# Patient Record
Sex: MALE | Race: WHITE | NOT HISPANIC OR LATINO | URBAN - METROPOLITAN AREA
[De-identification: names, ages, dates, MRNs, and addresses within clinical notes are randomized per-mention and may not be internally consistent; named-entity substitution may affect disease eponyms.]

---

## 2017-03-29 ENCOUNTER — INPATIENT (INPATIENT)
Facility: HOSPITAL | Age: 82
LOS: 12 days | Discharge: NURSING, REHAB OR LONG-TERM CARE | DRG: 854 | End: 2017-04-11
Attending: INTERNAL MEDICINE | Admitting: INTERNAL MEDICINE
Payer: MEDICARE

## 2017-03-29 VITALS
HEIGHT: 69 IN | RESPIRATION RATE: 16 BRPM | DIASTOLIC BLOOD PRESSURE: 74 MMHG | HEART RATE: 106 BPM | SYSTOLIC BLOOD PRESSURE: 153 MMHG | OXYGEN SATURATION: 97 % | TEMPERATURE: 99 F | WEIGHT: 188.05 LBS

## 2017-03-29 DIAGNOSIS — E11.9 TYPE 2 DIABETES MELLITUS WITHOUT COMPLICATIONS: ICD-10-CM

## 2017-03-29 DIAGNOSIS — L03.116 CELLULITIS OF LEFT LOWER LIMB: ICD-10-CM

## 2017-03-29 DIAGNOSIS — M71.10 OTHER INFECTIVE BURSITIS, UNSPECIFIED SITE: ICD-10-CM

## 2017-03-29 LAB
ACETONE SERPL-MCNC: ABNORMAL
ALBUMIN SERPL ELPH-MCNC: 2.6 G/DL — LOW (ref 3.3–5.2)
ALP SERPL-CCNC: 114 U/L — SIGNIFICANT CHANGE UP (ref 40–120)
ALT FLD-CCNC: 42 U/L — HIGH
ANION GAP SERPL CALC-SCNC: 14 MMOL/L — SIGNIFICANT CHANGE UP (ref 5–17)
APTT BLD: 29.9 SEC — SIGNIFICANT CHANGE UP (ref 27.5–37.4)
AST SERPL-CCNC: 64 U/L — HIGH
BASOPHILS # BLD AUTO: 0 K/UL — SIGNIFICANT CHANGE UP (ref 0–0.2)
BILIRUB SERPL-MCNC: 1.7 MG/DL — SIGNIFICANT CHANGE UP (ref 0.4–2)
BLD GP AB SCN SERPL QL: SIGNIFICANT CHANGE UP
BUN SERPL-MCNC: 23 MG/DL — HIGH (ref 8–20)
CALCIUM SERPL-MCNC: 9 MG/DL — SIGNIFICANT CHANGE UP (ref 8.6–10.2)
CHLORIDE SERPL-SCNC: 95 MMOL/L — LOW (ref 98–107)
CO2 SERPL-SCNC: 24 MMOL/L — SIGNIFICANT CHANGE UP (ref 22–29)
CREAT SERPL-MCNC: 0.83 MG/DL — SIGNIFICANT CHANGE UP (ref 0.5–1.3)
EOSINOPHIL # BLD AUTO: 0 K/UL — SIGNIFICANT CHANGE UP (ref 0–0.5)
EOSINOPHIL NFR BLD AUTO: 1 % — SIGNIFICANT CHANGE UP (ref 0–6)
GLUCOSE SERPL-MCNC: 162 MG/DL — HIGH (ref 70–115)
HCT VFR BLD CALC: 36.4 % — LOW (ref 42–52)
HGB BLD-MCNC: 12.7 G/DL — LOW (ref 14–18)
INR BLD: 1.38 RATIO — HIGH (ref 0.88–1.16)
LACTATE BLDV-MCNC: 2.1 MMOL/L — HIGH (ref 0.7–2)
LYMPHOCYTES # BLD AUTO: 1.5 K/UL — SIGNIFICANT CHANGE UP (ref 1–4.8)
LYMPHOCYTES # BLD AUTO: 13 % — LOW (ref 20–55)
MCHC RBC-ENTMCNC: 34.5 PG — HIGH (ref 27–31)
MCHC RBC-ENTMCNC: 34.9 G/DL — SIGNIFICANT CHANGE UP (ref 32–36)
MCV RBC AUTO: 98.9 FL — HIGH (ref 80–94)
MONOCYTES # BLD AUTO: 1.5 K/UL — HIGH (ref 0–0.8)
MONOCYTES NFR BLD AUTO: 13 % — HIGH (ref 3–10)
NEUTROPHILS # BLD AUTO: 8.6 K/UL — HIGH (ref 1.8–8)
NEUTROPHILS NFR BLD AUTO: 72 % — SIGNIFICANT CHANGE UP (ref 37–73)
PLAT MORPH BLD: NORMAL — SIGNIFICANT CHANGE UP
PLATELET # BLD AUTO: 235 K/UL — SIGNIFICANT CHANGE UP (ref 150–400)
POTASSIUM SERPL-MCNC: 5.1 MMOL/L — SIGNIFICANT CHANGE UP (ref 3.5–5.3)
POTASSIUM SERPL-SCNC: 5.1 MMOL/L — SIGNIFICANT CHANGE UP (ref 3.5–5.3)
PROT SERPL-MCNC: 9.4 G/DL — HIGH (ref 6.6–8.7)
PROTHROM AB SERPL-ACNC: 15.3 SEC — HIGH (ref 9.8–12.7)
RBC # BLD: 3.68 M/UL — LOW (ref 4.6–6.2)
RBC # FLD: 14.2 % — SIGNIFICANT CHANGE UP (ref 11–15.6)
RBC BLD AUTO: NORMAL — SIGNIFICANT CHANGE UP
SODIUM SERPL-SCNC: 133 MMOL/L — LOW (ref 135–145)
TYPE + AB SCN PNL BLD: SIGNIFICANT CHANGE UP
VARIANT LYMPHS # BLD: 1 % — SIGNIFICANT CHANGE UP (ref 0–6)
WBC # BLD: 11.8 K/UL — HIGH (ref 4.8–10.8)
WBC # FLD AUTO: 11.8 K/UL — HIGH (ref 4.8–10.8)

## 2017-03-29 PROCEDURE — 99223 1ST HOSP IP/OBS HIGH 75: CPT

## 2017-03-29 PROCEDURE — 73562 X-RAY EXAM OF KNEE 3: CPT | Mod: 26,LT

## 2017-03-29 PROCEDURE — 71010: CPT | Mod: 26

## 2017-03-29 PROCEDURE — 93970 EXTREMITY STUDY: CPT | Mod: 26

## 2017-03-29 PROCEDURE — 99285 EMERGENCY DEPT VISIT HI MDM: CPT

## 2017-03-29 PROCEDURE — 73552 X-RAY EXAM OF FEMUR 2/>: CPT | Mod: 26,LT

## 2017-03-29 RX ORDER — INSULIN GLARGINE 100 [IU]/ML
30 INJECTION, SOLUTION SUBCUTANEOUS AT BEDTIME
Qty: 0 | Refills: 0 | Status: DISCONTINUED | OUTPATIENT
Start: 2017-03-29 | End: 2017-03-31

## 2017-03-29 RX ORDER — DOCUSATE SODIUM 100 MG
100 CAPSULE ORAL THREE TIMES A DAY
Qty: 0 | Refills: 0 | Status: DISCONTINUED | OUTPATIENT
Start: 2017-03-29 | End: 2017-03-31

## 2017-03-29 RX ORDER — DEXTROSE 50 % IN WATER 50 %
25 SYRINGE (ML) INTRAVENOUS ONCE
Qty: 0 | Refills: 0 | Status: DISCONTINUED | OUTPATIENT
Start: 2017-03-29 | End: 2017-03-31

## 2017-03-29 RX ORDER — CEFAZOLIN SODIUM 1 G
1000 VIAL (EA) INJECTION ONCE
Qty: 0 | Refills: 0 | Status: COMPLETED | OUTPATIENT
Start: 2017-03-29 | End: 2017-03-29

## 2017-03-29 RX ORDER — MORPHINE SULFATE 50 MG/1
2 CAPSULE, EXTENDED RELEASE ORAL EVERY 4 HOURS
Qty: 0 | Refills: 0 | Status: DISCONTINUED | OUTPATIENT
Start: 2017-03-29 | End: 2017-03-31

## 2017-03-29 RX ORDER — LISINOPRIL 2.5 MG/1
20 TABLET ORAL DAILY
Qty: 0 | Refills: 0 | Status: DISCONTINUED | OUTPATIENT
Start: 2017-03-29 | End: 2017-03-31

## 2017-03-29 RX ORDER — ONDANSETRON 8 MG/1
4 TABLET, FILM COATED ORAL EVERY 4 HOURS
Qty: 0 | Refills: 0 | Status: DISCONTINUED | OUTPATIENT
Start: 2017-03-29 | End: 2017-03-31

## 2017-03-29 RX ORDER — ACETAMINOPHEN 500 MG
650 TABLET ORAL EVERY 6 HOURS
Qty: 0 | Refills: 0 | Status: DISCONTINUED | OUTPATIENT
Start: 2017-03-29 | End: 2017-03-31

## 2017-03-29 RX ORDER — DEXTROSE 50 % IN WATER 50 %
12.5 SYRINGE (ML) INTRAVENOUS ONCE
Qty: 0 | Refills: 0 | Status: DISCONTINUED | OUTPATIENT
Start: 2017-03-29 | End: 2017-03-31

## 2017-03-29 RX ORDER — MORPHINE SULFATE 50 MG/1
4 CAPSULE, EXTENDED RELEASE ORAL ONCE
Qty: 0 | Refills: 0 | Status: DISCONTINUED | OUTPATIENT
Start: 2017-03-29 | End: 2017-03-29

## 2017-03-29 RX ORDER — INSULIN LISPRO 100/ML
VIAL (ML) SUBCUTANEOUS
Qty: 0 | Refills: 0 | Status: DISCONTINUED | OUTPATIENT
Start: 2017-03-29 | End: 2017-03-31

## 2017-03-29 RX ORDER — DEXTROSE 50 % IN WATER 50 %
1 SYRINGE (ML) INTRAVENOUS ONCE
Qty: 0 | Refills: 0 | Status: DISCONTINUED | OUTPATIENT
Start: 2017-03-29 | End: 2017-03-31

## 2017-03-29 RX ORDER — SODIUM CHLORIDE 9 MG/ML
3 INJECTION INTRAMUSCULAR; INTRAVENOUS; SUBCUTANEOUS ONCE
Qty: 0 | Refills: 0 | Status: COMPLETED | OUTPATIENT
Start: 2017-03-29 | End: 2017-03-29

## 2017-03-29 RX ORDER — GLUCAGON INJECTION, SOLUTION 0.5 MG/.1ML
1 INJECTION, SOLUTION SUBCUTANEOUS ONCE
Qty: 0 | Refills: 0 | Status: DISCONTINUED | OUTPATIENT
Start: 2017-03-29 | End: 2017-03-31

## 2017-03-29 RX ORDER — ENOXAPARIN SODIUM 100 MG/ML
40 INJECTION SUBCUTANEOUS EVERY 24 HOURS
Qty: 0 | Refills: 0 | Status: DISCONTINUED | OUTPATIENT
Start: 2017-03-29 | End: 2017-03-30

## 2017-03-29 RX ORDER — SENNA PLUS 8.6 MG/1
2 TABLET ORAL AT BEDTIME
Qty: 0 | Refills: 0 | Status: DISCONTINUED | OUTPATIENT
Start: 2017-03-29 | End: 2017-03-31

## 2017-03-29 RX ORDER — SODIUM CHLORIDE 9 MG/ML
1000 INJECTION, SOLUTION INTRAVENOUS
Qty: 0 | Refills: 0 | Status: DISCONTINUED | OUTPATIENT
Start: 2017-03-29 | End: 2017-03-31

## 2017-03-29 RX ORDER — SODIUM CHLORIDE 9 MG/ML
1000 INJECTION INTRAMUSCULAR; INTRAVENOUS; SUBCUTANEOUS
Qty: 0 | Refills: 0 | Status: DISCONTINUED | OUTPATIENT
Start: 2017-03-29 | End: 2017-03-31

## 2017-03-29 RX ORDER — CEFAZOLIN SODIUM 1 G
2000 VIAL (EA) INJECTION EVERY 8 HOURS
Qty: 0 | Refills: 0 | Status: DISCONTINUED | OUTPATIENT
Start: 2017-03-29 | End: 2017-03-31

## 2017-03-29 RX ORDER — METFORMIN HYDROCHLORIDE 850 MG/1
0 TABLET ORAL
Qty: 0 | Refills: 0 | COMMUNITY

## 2017-03-29 RX ADMIN — INSULIN GLARGINE 30 UNIT(S): 100 INJECTION, SOLUTION SUBCUTANEOUS at 21:40

## 2017-03-29 RX ADMIN — MORPHINE SULFATE 2 MILLIGRAM(S): 50 CAPSULE, EXTENDED RELEASE ORAL at 17:57

## 2017-03-29 RX ADMIN — Medication 2 MILLIGRAM(S): at 15:41

## 2017-03-29 RX ADMIN — MORPHINE SULFATE 4 MILLIGRAM(S): 50 CAPSULE, EXTENDED RELEASE ORAL at 15:35

## 2017-03-29 RX ADMIN — Medication 100 MILLIGRAM(S): at 22:14

## 2017-03-29 RX ADMIN — MORPHINE SULFATE 2 MILLIGRAM(S): 50 CAPSULE, EXTENDED RELEASE ORAL at 22:14

## 2017-03-29 RX ADMIN — Medication 100 MILLIGRAM(S): at 15:35

## 2017-03-29 RX ADMIN — MORPHINE SULFATE 2 MILLIGRAM(S): 50 CAPSULE, EXTENDED RELEASE ORAL at 18:15

## 2017-03-29 RX ADMIN — SODIUM CHLORIDE 3 MILLILITER(S): 9 INJECTION INTRAMUSCULAR; INTRAVENOUS; SUBCUTANEOUS at 14:37

## 2017-03-29 RX ADMIN — SENNA PLUS 2 TABLET(S): 8.6 TABLET ORAL at 21:40

## 2017-03-29 RX ADMIN — MORPHINE SULFATE 2 MILLIGRAM(S): 50 CAPSULE, EXTENDED RELEASE ORAL at 22:44

## 2017-03-29 RX ADMIN — ENOXAPARIN SODIUM 40 MILLIGRAM(S): 100 INJECTION SUBCUTANEOUS at 21:42

## 2017-03-29 RX ADMIN — SODIUM CHLORIDE 60 MILLILITER(S): 9 INJECTION INTRAMUSCULAR; INTRAVENOUS; SUBCUTANEOUS at 17:57

## 2017-03-29 RX ADMIN — MORPHINE SULFATE 4 MILLIGRAM(S): 50 CAPSULE, EXTENDED RELEASE ORAL at 00:00

## 2017-03-29 RX ADMIN — Medication 100 MILLIGRAM(S): at 21:42

## 2017-03-29 RX ADMIN — LISINOPRIL 20 MILLIGRAM(S): 2.5 TABLET ORAL at 21:40

## 2017-03-29 NOTE — H&P ADULT - HISTORY OF PRESENT ILLNESS
Pt has had left knee pain since Monday gradually worsening pain and edema over the days. He saw his PMD who ordered MRI and sent him to Ortho. Ortho tapped the knee outpatient that grew MSSA per Dr Doe. MRi showed torn meniscus and surgical repair was planned for this week. He was given Keflex and pain meds which he took for 4 days. He also had a Doppler study that was neg for DVT. But since pain worsened he came here. no fever, trauma, or any other associated symptoms  PMH- DM2, HTN ?, heart murmur, alc cirrhosis  SH- quit tobacco 50 yrs ago, alcohol use most days of the week. since last monday has not used any, not in withdrawal  Meds- obtained from pharmacy

## 2017-03-29 NOTE — CONSULT NOTE ADULT - PROBLEM SELECTOR RECOMMENDATION 9
Culture of bursa from outside orthopedics office with MSSA  Reviewed outside labs  Start Cefazolin 2gm IV [id number]  Follow up blood cultures  Orthopedics consult  Check ESR and CRP

## 2017-03-29 NOTE — ED STATDOCS - PROGRESS NOTE DETAILS
80 y/o M pt w/ no significant PMHx presents to the ED c/o L knee pain and swelling. Pt's family states that the pt has a torn meniscus and a staph infection of his knee; pt was sent in by his PMD for abx therapy. Pt will have surgery by [facility]. Focused evaluation, protocol orders entered, placed in main for complete evaluation by another provider. PMD: Dr. Doe. PE: warm, swollen L knee, positive pitting edema of L calf (pt had recent doppler that was negative for DVT), unable to straighten out L knee. Received call out from Dr. Doe that pt is noncompliant DM. Dr. Samuels and Dr. Sutton to be notified. Pt to be admitted to the hospitalist.

## 2017-03-29 NOTE — ED ADULT NURSE NOTE - OBJECTIVE STATEMENT
received pt AOx3 with grandson at bedside, pt states he has a left torn meniscus with swelling and infection to area. pt sent in by Dr arteaga for IV abx. prior to possible surgery on Friday. pt has weak DP pulse. respirations even unlabored, MAEx4, neuro intact, no compliant diabetic, 3+ edema noted to LLE and 2+ to RLE will continue ot monitor.

## 2017-03-29 NOTE — H&P ADULT - EXTREMITIES COMMENTS
left knee swelling, warmth, ascending mild erythema over back of the thigh, associated edema and warmth + no calf tenderness

## 2017-03-29 NOTE — CONSULT NOTE ADULT - SUBJECTIVE AND OBJECTIVE BOX
Pt Name: MAGED NOBLE    MRN: [id number]      Patient is a 81y Male presenting to the emergency department with a chief complaint of left knee pain for approximately 10 days.     Patient is a 81y old  Male who presents with a chief complaint of left knee pain for approximately 10 days. He reports of sudden onset of pain on 3/20/2017 while in [place]. No trauma recalled. He saw an orthopedist a few days later. He had an aspiration performed and an MRI ordered. He was diagnosed with a mensical tear early this week. The aspiration cell count was 11k. The culture was found to have few staph with sensitivity to Keflex. He was started on Keflex 1-2 days ago. Due to the swelling of the left lower leg, he was sent for a doppler. No DVT found. Inisital aspiration on Thursday found 30ml of fluid and repeat yesterday 20ml. Mildly cloudy. He denies of past knee surgery. No past injections. He reports of pain with extension. No current fevers. He was referred by office for additional care.       REVIEW OF SYSTEMS      General: Alert, responsive, in NAD    Skin/Breast: No rashes, no pruritis   	  Ophthalmologic: No visual changes. No redness.   	  ENMT:	No discharge. No swelling.    Respiratory and Thorax: No difficulty breathing. No cough.  	   Cardiovascular:	No chest pain. No palpitations.    Gastrointestinal:	 No abdominal pain. No diarrhea.     Genitourinary: No dysuria. No bleeding.    Musculoskeletal: SEE HPI.    Neurological: + chronic tremor No sensory or motor changes.     Psychiatric: No anxiety or depression.    Hematology/Lymphatics: No swelling.    Endocrine: No Hx of diabetes.    ROS is otherwise negative.    PAST MEDICAL & SURGICAL HISTORY:  PAST MEDICAL & SURGICAL HISTORY:  DM (diabetes mellitus)  Abdominal surgery      Allergies: No Known Allergies      Medications: morphine  - Injectable 4milliGRAM(s) IV Push Once  ceFAZolin   IVPB 1000milliGRAM(s) IV Intermittent once  LORazepam   Injectable 2milliGRAM(s) IV Push Once      FAMILY HISTORY: non-contributory    Social History: + daily alcohol usage - "few vodkas daily"    Ambulation: Walking independently [x] With Cane [ ] With Walker [ ]  Bedbound [ ]                           12.7   11.8  )-----------( 235      ( 29 Mar 2017 14:32 )             36.4     29 Mar 2017 14:32    133    |  95     |  23.0   ----------------------------<  162    5.1     |  24.0   |  0.83     Ca    9.0        29 Mar 2017 14:32    TPro  9.4    /  Alb  2.6    /  TBili  1.7    /  DBili  x      /  AST  64     /  ALT  42     /  AlkPhos  114    29 Mar 2017 14:32      PHYSICAL EXAM:    Vital Signs Last 24 Hrs  T(C): 37.1, Max: 37.1 (03-29 @ 12:39)  T(F): 98.8, Max: 98.8 (03-29 @ 12:39)  HR: 106 (106 - 106)  BP: 153/74 (153/74 - 153/74)  BP(mean): --  RR: 16 (16 - 16)  SpO2: 97% (97% - 97%)  Daily Height in cm: 175.26 (29 Mar 2017 12:39)    Daily     Appearance: Alert, responsive, in no acute distress. + mild tremor noted.    Neurological: Sensation is grossly intact to light touch. 5/5 motor function of all extremities. No focal deficits or weaknesses found.    Skin: no rash on visible skin. Skin is clean, dry and intact. No bleeding. No abrasions. No ulcerations. + mild erythema of the medial distal left thigh area. No knee erythema. + mild 0-1+ edema of the left lower leg. No surgical scars noted. No knee warmth.    Vascular: 2+ distal pulses. Cap refill < 2 sec. + signs of early distal venous insuffiencey. No extremity ulcerations. No cyanosis.    Musculoskeletal:         Left Upper Extremity:  + NROM. Non-tender. No signs of trauma.        Right Upper Extremity: + NROM. Non-tender. No signs of trauma.        Left Lower Extremity: + guarding with attempted knee extension. + limited A/PROM from -20 extension to 90 flexion. No calf tenderness. No effusion. No        Right Lower Extremity: + NROM. Non-tender. No signs of trauma.     Imaging Studies:    US in ED- No significant intra / extra-articular effusion found to aspirate. + subcutaneous changes c/w cellultis found.     A/P:  Pt is a  81y Male with Patient is a 81y old  Male who presents with a chief complaint of left knee pain found to have left thigh cellulitis.    PLAN:   * Patient does not want a repeated aspiration performed in light of unofficial US findings.  * IV antibiotics  * Pain control  * Will follow Pt Name: MAGED NOBLE    MRN: [id number]      Patient is a 81y Male presenting to the emergency department with a chief complaint of left knee pain for approximately 10 days.     Patient is a 81y old  Male who presents with a chief complaint of left knee pain for approximately 10 days. He reports of sudden onset of pain on 3/20/2017 while in [place]. No trauma recalled. He saw an orthopedist a few days later. He had an aspiration performed and an MRI ordered. He was diagnosed with a mensical tear early this week. The aspiration cell count was 11k. The culture was found to have few staph with sensitivity to Keflex. He was started on Keflex 1-2 days ago. Due to the swelling of the left lower leg, he was sent for a doppler. No DVT found. Inisital aspiration on Thursday found 30ml of fluid and repeat yesterday 20ml. Mildly cloudy. He denies of past knee surgery. No past injections. He reports of pain with extension. No current fevers. He was referred by office for additional care.       REVIEW OF SYSTEMS      General: Alert, responsive, in NAD    Skin/Breast: No rashes, no pruritis   	  Ophthalmologic: No visual changes. No redness.   	  ENMT:	No discharge. No swelling.    Respiratory and Thorax: No difficulty breathing. No cough.  	   Cardiovascular:	No chest pain. No palpitations.    Gastrointestinal:	 No abdominal pain. No diarrhea.     Genitourinary: No dysuria. No bleeding.    Musculoskeletal: SEE HPI.    Neurological: + chronic tremor No sensory or motor changes.     Psychiatric: No anxiety or depression.    Hematology/Lymphatics: No swelling.    Endocrine: No Hx of diabetes.    ROS is otherwise negative.    PAST MEDICAL & SURGICAL HISTORY:  PAST MEDICAL & SURGICAL HISTORY:  DM (diabetes mellitus)  Abdominal surgery      Allergies: No Known Allergies      Medications: morphine  - Injectable 4milliGRAM(s) IV Push Once  ceFAZolin   IVPB 1000milliGRAM(s) IV Intermittent once  LORazepam   Injectable 2milliGRAM(s) IV Push Once      FAMILY HISTORY: non-contributory    Social History: + daily alcohol usage - "few vodkas daily"    Ambulation: Walking independently [x] With Cane [ ] With Walker [ ]  Bedbound [ ]                           12.7   11.8  )-----------( 235      ( 29 Mar 2017 14:32 )             36.4     29 Mar 2017 14:32    133    |  95     |  23.0   ----------------------------<  162    5.1     |  24.0   |  0.83     Ca    9.0        29 Mar 2017 14:32    TPro  9.4    /  Alb  2.6    /  TBili  1.7    /  DBili  x      /  AST  64     /  ALT  42     /  AlkPhos  114    29 Mar 2017 14:32      PHYSICAL EXAM:    Vital Signs Last 24 Hrs  T(C): 37.1, Max: 37.1 (03-29 @ 12:39)  T(F): 98.8, Max: 98.8 (03-29 @ 12:39)  HR: 106 (106 - 106)  BP: 153/74 (153/74 - 153/74)  BP(mean): --  RR: 16 (16 - 16)  SpO2: 97% (97% - 97%)  Daily Height in cm: 175.26 (29 Mar 2017 12:39)    Daily     Appearance: Alert, responsive, in no acute distress. + mild tremor noted.    Neurological: Sensation is grossly intact to light touch. 5/5 motor function of all extremities. No focal deficits or weaknesses found.    Skin: no rash on visible skin. Skin is clean, dry and intact. No bleeding. No abrasions. No ulcerations. + mild erythema of the medial distal left thigh area. No knee erythema. + mild 0-1+ edema of the left lower leg. No surgical scars noted. No knee warmth.    Vascular: 2+ distal pulses. Cap refill < 2 sec. + signs of early distal venous insuffiencey. No extremity ulcerations. No cyanosis.    Musculoskeletal:         Left Upper Extremity:  + NROM. Non-tender. No signs of trauma.        Right Upper Extremity: + NROM. Non-tender. No signs of trauma.        Left Lower Extremity: + guarding with attempted knee extension. + limited A/PROM from -20 extension to 90 flexion. No calf tenderness. No effusion. No        Right Lower Extremity: + NROM. Non-tender. No signs of trauma.     Imaging Studies:    US in ED- No significant intra / extra-articular effusion found to aspirate. + subcutaneous changes c/w cellultis found.     A/P:  Pt is a  81y Male with Patient is a 81y old  Male who presents with a chief complaint of left knee pain found to have left thigh cellulitis.    PLAN:   * Patient does not want a repeated aspiration performed in light of unofficial US findings.  * IV antibiotics  * Pain control  * Will follow  This case was discussed with his outpatient primary care physician I think this is gross orthopedic malpractice that this gentleman has been treated as an outpatient and scheduled for an outpatient elective knee arthroscopy for septic joint with staff despite the cell count being on 11,000 the patient's cultures are positive. He is being now sent to our hospital despite that these physicians do outpatient surgery and he also has a total joint specialist in their practice [facility]. per practices at least I believe this is Denny abusive [facility] orthopedic service line this gentleman will be sent to the hospital treated appropriately scheduled for operative management if repeat aspirations are positive for infection etc./his cultures are positive

## 2017-03-29 NOTE — H&P ADULT - ASSESSMENT
1. Septic knee- MSSA by cultures. Ancef by ID. bld c/s testing, rpt doppler here.   2. Pain issues- percocet and morphine, zofran, laxatives, IVF  3. Heart murmur- defer to ID if this needs to be assessed in relation to infection  4. DM2- HISS  5. Alc Cirrhosis- avoid hepatotoxics.   Lovenox

## 2017-03-29 NOTE — ED PROVIDER NOTE - PROGRESS NOTE DETAILS
pt evaluated by Prabhakar (ortho PA) and states likely cellulitis.  agree with admit for IV abx and will follow.  case d/w Arnold and will admit

## 2017-03-29 NOTE — ED ADULT TRIAGE NOTE - CHIEF COMPLAINT QUOTE
left knee pain due to torn cartilage,  with a staph infection. sent over by Dr Jarod Doe for antibiotic therapy

## 2017-03-29 NOTE — CONSULT NOTE ADULT - SUBJECTIVE AND OBJECTIVE BOX
[facility]  INFECTIOUS DISEASES AND INTERNAL MEDICINE OF [place]  =======================================================  Ketan Ramesh MD  Diplomates American Board of Internal Medicine and Infectious Diseases  =======================================================      MRN-[id number]  MAGED NOBLE    82y/o  Male with left knee torn meniscus was evaluated by orthopedics as an outpatient and had a arthrocentesis 3/24/17 with  11,398 nucleated cells with 98%polys and culture with MSSA. Patient was sent in by Dr Doe for further treatment and evaluation.     Past Medical & Surgical Hx:  DM (diabetes mellitus)  No significant past surgical history      Social Hx:  Former smoker      FAMILY HISTORY:  No pertinent family history in first degree relatives      Allergies  No Known Allergies      Antibiotics:  None       REVIEW OF SYSTEMS:  CONSTITUTIONAL:  No chills  HEENT:   No diplopia or blurred vision. No earache, sore throat or runny nose.  CARDIOVASCULAR:  No pressure, squeezing, strangling, tightness, heaviness or aching about the chest, neck, axilla or epigastrium.  RESPIRATORY:  No cough, shortness of breat  GASTROINTESTINAL:  No nausea, vomiting or diarrhea.  GENITOURINARY:  No dysuria, frequency or urgency.   MUSCULOSKELETAL:  Left knee pain  SKIN:  No change in skin, hair or nails.  NEUROLOGIC:  No paresthesias, fasciculations  PSYCHIATRIC:  No disorder of thought or mood.  ENDOCRINE:  No heat or cold intolerance, polyuria or polydipsia.  HEMATOLOGICAL:  No easy bruising or bleeding.       Physical Exam:  Vital Signs Last 24 Hrs  T(C): 37.1, Max: 37.1 (03-29 @ 12:39)  T(F): 98.8, Max: 98.8 (03-29 @ 12:39)  HR: 106 (106 - 106)  BP: 153/74 (153/74 - 153/74)  RR: 16 (16 - 16)  SpO2: 97% (97% - 97%)  Height (cm): 175.3 (03-29 @ 12:51)  Weight (kg): 85.3 (03-29 @ 12:51)  BMI (kg/m2): 27.8 (03-29 @ 12:51)  BSA (m2): 2.01 (03-29 @ 12:51)    GEN: NAD, pleasant  HEENT: normocephalic and atraumatic. EOMI. PERRL.    NECK: Supple.   LUNGS: Clear to auscultation.  HEART: Regular rate and rhythm  ABDOMEN: Soft, nontender, and nondistended.  Positive bowel sounds.    : no CVA tenderness  EXTREMITIES: LLE knee swelling and leg swelling  MSK: Left knee joint effusion and swelling  NEUROLOGIC: AAOx3  PSYCHIATRIC: Appropriate affect .  SKIN: Left knee redness and warmth        Labs:  29 Mar 2017 14:32    133    |  95     |  23.0   ----------------------------<  162    5.1     |  24.0   |  0.83     Ca    9.0        29 Mar 2017 14:32    TPro  9.4    /  Alb  2.6    /  TBili  1.7    /  DBili  x      /  AST  64     /  ALT  42     /  AlkPhos  114    29 Mar 2017 14:32                          12.7   11.8  )-----------( 235      ( 29 Mar 2017 14:32 )             36.4       PT/INR - ( 29 Mar 2017 14:32 )   PT: 15.3 sec;   INR: 1.38 ratio         PTT - ( 29 Mar 2017 14:32 )  PTT:29.9 sec    LIVER FUNCTIONS - ( 29 Mar 2017 14:32 )  Alb: 2.6 g/dL / Pro: 9.4 g/dL / ALK PHOS: 114 U/L / ALT: 42 U/L / AST: 64 U/L / GGT: x               EXAM:  XR FEMUR 2 VIEWS LT                        EXAM:  XR KNEE 3 VIEWS LT                        PROCEDURE DATE:  03/29/2017    INTERPRETATION:  Left femur and  left knee  7 views were performed in AP, oblique and lateral projections  History: Pain. Cellulitis.  Osseous structures are intact. No evidence of acute fracture or   dislocation.  There is no significant joint space narrowing. The   articular surfaces appear intact.. Effusion in suprapatellar recess.    Impression:  No bone abnormality. Joint effusion      CXR  Impression: Small left pleural effusion.

## 2017-03-30 LAB
ANION GAP SERPL CALC-SCNC: 9 MMOL/L — SIGNIFICANT CHANGE UP (ref 5–17)
B PERT IGG+IGM PNL SER: ABNORMAL
BUN SERPL-MCNC: 20 MG/DL — SIGNIFICANT CHANGE UP (ref 8–20)
CALCIUM SERPL-MCNC: 8.9 MG/DL — SIGNIFICANT CHANGE UP (ref 8.6–10.2)
CHLORIDE SERPL-SCNC: 102 MMOL/L — SIGNIFICANT CHANGE UP (ref 98–107)
CO2 SERPL-SCNC: 23 MMOL/L — SIGNIFICANT CHANGE UP (ref 22–29)
COLOR FLD: YELLOW
CREAT SERPL-MCNC: 0.94 MG/DL — SIGNIFICANT CHANGE UP (ref 0.5–1.3)
CRP SERPL-MCNC: 15 MG/DL — HIGH (ref 0–0.4)
CRYSTALS SNV QL MICRO: SIGNIFICANT CHANGE UP
ERYTHROCYTE [SEDIMENTATION RATE] IN BLOOD: 34 MM/HR — HIGH (ref 0–20)
FLUID INTAKE SUBSTANCE CLASS: SIGNIFICANT CHANGE UP
FLUID SEGMENTED GRANULOCYTES: 95 % — SIGNIFICANT CHANGE UP
GLUCOSE SERPL-MCNC: 133 MG/DL — HIGH (ref 70–115)
GRAM STN FLD: SIGNIFICANT CHANGE UP
HBA1C BLD-MCNC: 6.9 % — HIGH (ref 4–5.6)
HCT VFR BLD CALC: 34.4 % — LOW (ref 42–52)
HGB BLD-MCNC: 11.8 G/DL — LOW (ref 14–18)
LACTATE SERPL-SCNC: 1.6 MMOL/L — SIGNIFICANT CHANGE UP (ref 0.5–2)
LYMPHOCYTES # FLD: 2 % — SIGNIFICANT CHANGE UP
MCHC RBC-ENTMCNC: 34.3 G/DL — SIGNIFICANT CHANGE UP (ref 32–36)
MCHC RBC-ENTMCNC: 34.3 PG — HIGH (ref 27–31)
MCV RBC AUTO: 100 FL — HIGH (ref 80–94)
MONOS+MACROS # FLD: 3 % — SIGNIFICANT CHANGE UP
PLATELET # BLD AUTO: 192 K/UL — SIGNIFICANT CHANGE UP (ref 150–400)
POTASSIUM SERPL-MCNC: 4.7 MMOL/L — SIGNIFICANT CHANGE UP (ref 3.5–5.3)
POTASSIUM SERPL-SCNC: 4.7 MMOL/L — SIGNIFICANT CHANGE UP (ref 3.5–5.3)
RBC # BLD: 3.44 M/UL — LOW (ref 4.6–6.2)
RBC # FLD: 13.9 % — SIGNIFICANT CHANGE UP (ref 11–15.6)
RCV VOL RI: HIGH /UL (ref 0–5)
SODIUM SERPL-SCNC: 134 MMOL/L — LOW (ref 135–145)
SPECIMEN SOURCE: SIGNIFICANT CHANGE UP
TOTAL NUCLEATED CELL COUNT, BODY FLUID: HIGH /UL (ref 0–5)
TUBE TYPE: SIGNIFICANT CHANGE UP
WBC # BLD: 9.4 K/UL — SIGNIFICANT CHANGE UP (ref 4.8–10.8)
WBC # FLD AUTO: 9.4 K/UL — SIGNIFICANT CHANGE UP (ref 4.8–10.8)

## 2017-03-30 PROCEDURE — 93010 ELECTROCARDIOGRAM REPORT: CPT

## 2017-03-30 PROCEDURE — 99233 SBSQ HOSP IP/OBS HIGH 50: CPT

## 2017-03-30 PROCEDURE — 93306 TTE W/DOPPLER COMPLETE: CPT | Mod: 26

## 2017-03-30 RX ORDER — SODIUM CHLORIDE 9 MG/ML
1000 INJECTION, SOLUTION INTRAVENOUS
Qty: 0 | Refills: 0 | Status: DISCONTINUED | OUTPATIENT
Start: 2017-03-30 | End: 2017-03-31

## 2017-03-30 RX ADMIN — MORPHINE SULFATE 2 MILLIGRAM(S): 50 CAPSULE, EXTENDED RELEASE ORAL at 22:25

## 2017-03-30 RX ADMIN — INSULIN GLARGINE 30 UNIT(S): 100 INJECTION, SOLUTION SUBCUTANEOUS at 22:03

## 2017-03-30 RX ADMIN — LISINOPRIL 20 MILLIGRAM(S): 2.5 TABLET ORAL at 05:32

## 2017-03-30 RX ADMIN — Medication 100 MILLIGRAM(S): at 22:03

## 2017-03-30 RX ADMIN — Medication 100 MILLIGRAM(S): at 05:32

## 2017-03-30 RX ADMIN — Medication 100 MILLIGRAM(S): at 22:10

## 2017-03-30 RX ADMIN — Medication 100 MILLIGRAM(S): at 05:38

## 2017-03-30 RX ADMIN — SENNA PLUS 2 TABLET(S): 8.6 TABLET ORAL at 22:10

## 2017-03-30 RX ADMIN — MORPHINE SULFATE 2 MILLIGRAM(S): 50 CAPSULE, EXTENDED RELEASE ORAL at 05:32

## 2017-03-30 RX ADMIN — MORPHINE SULFATE 2 MILLIGRAM(S): 50 CAPSULE, EXTENDED RELEASE ORAL at 05:45

## 2017-03-30 RX ADMIN — MORPHINE SULFATE 2 MILLIGRAM(S): 50 CAPSULE, EXTENDED RELEASE ORAL at 16:20

## 2017-03-30 RX ADMIN — MORPHINE SULFATE 2 MILLIGRAM(S): 50 CAPSULE, EXTENDED RELEASE ORAL at 21:59

## 2017-03-30 RX ADMIN — Medication 100 MILLIGRAM(S): at 13:27

## 2017-03-30 RX ADMIN — MORPHINE SULFATE 2 MILLIGRAM(S): 50 CAPSULE, EXTENDED RELEASE ORAL at 16:02

## 2017-03-30 RX ADMIN — ENOXAPARIN SODIUM 40 MILLIGRAM(S): 100 INJECTION SUBCUTANEOUS at 21:54

## 2017-03-30 NOTE — PROGRESS NOTE ADULT - ASSESSMENT
1. Septic knee, bacteremia- MSSA by cultures. Ancef by ID. repeat knee tap done here. planned knee washout tomorrow by ortho.    2. Pain issues- percocet and morphine, zofran, laxatives, IVF. expalined the need to take Po pain meds for mild and moderate pain and not wait for it to become severe.  3. Heart murmur- TTE ordered. if needed will consider MICAELA.   4. DM2- HISS  5. Alc Cirrhosis- avoid hepatotoxics.   Lovenox  Pt has no clear contraindications to undergo planned procedure. And his comorbidities are stable and controlled. May proceed with procedure with the risks of the procedure itself involved.

## 2017-03-30 NOTE — ED ADULT NURSE REASSESSMENT NOTE - NS ED NURSE REASSESS COMMENT FT1
pt resting comfortably, tolerable level of comfort for pain. family at bedside, aware of plan of care. in no resp distress. will continue ot monitor.
report given to angel weaver RN
Report called to Karine on 6T. Transport notified. Pt to go to [id number]

## 2017-03-30 NOTE — PROGRESS NOTE ADULT - ASSESSMENT
Assessment and Plan:   Assessment:  · Assessment		  1. Septic knee- MSSA by cultures. Ancef by ID. f/u bld cx sensitivity, pre-op for tomorrow for Incision and Drainage of LT Knee by Orthopedics, NPO after MN  2. Pain issues- percocet and morphine, zofran, laxatives, IVF  3. Heart murmur- defer to ID if this needs to be assessed in relation to infection  4. DM2- HISS  5. DVT proph- lovenox  6. Medial clearance- Echo results pending

## 2017-03-30 NOTE — PROGRESS NOTE ADULT - SUBJECTIVE AND OBJECTIVE BOX
MAGED READ    [id number]    81y      Male    INTERVAL HPI/OVERNIGHT EVENTS:  Pt has had left knee pain since Monday gradually worsening pain and edema over the days. He saw his PMD who ordered MRI and sent him to Ortho. Ortho tapped the knee outpatient that grew MSSA per Dr Doe. MRi showed torn meniscus and surgical repair was planned for this week. He was given Keflex and pain meds which he took for 4 days. He also had a Doppler study that was neg for DVT. But since pain worsened he came here. no fever, trauma, or any other associated symptoms    Pt continues to complain of lt knee pain s/p aspiration of effsion today by Ortho.  Pre-op for tomorrow for Incision and Debridement of knee.  No new complaints.      REVIEW OF SYSTEMS:    CONSTITUTIONAL: No fever, weight loss, or fatigue  RESPIRATORY: No cough, wheezing, hemoptysis; No shortness of breath  CARDIOVASCULAR: No chest pain, palpitations  GASTROINTESTINAL: No abdominal or epigastric pain. No nausea, vomiting  NEUROLOGICAL: No headaches, memory loss, loss of strength.      Vital Signs Last 24 Hrs  T(C): 36.6, Max: 37.1 (03-30 @ 00:31)  T(F): 97.9, Max: 98.8 (03-30 @ 00:31)  HR: 90 (90 - 106)  BP: 167/79 (144/72 - 167/79)  RR: 18 (16 - 20)  SpO2: 95% (93% - 99%)    PHYSICAL EXAM:    GENERAL: NAD, lethargic but arousable, slightly confused but mostly answering appropriately  HEENT: PERRL, +EOMI  NECK: soft, Supple, No JVD,   CHEST/LUNG: Clear to percussion bilaterally; No wheezing  HEART: S1S2+, Regular rate and rhythm; + murmur, rubs, or gallops  ABDOMEN: Soft, Nontender, Nondistended; Bowel sounds present  EXTREMITIES:  2+ Peripheral Pulses, Lt knee: + moderate effusion noted today. + mild joint warmth. + improved extension (near -10 deg). + worsened flexion (less than 50 deg) No calf tenderness.  SKIN: No rashes or lesions  NEURO: Alert , no focal deficits, no motor r sensory loss  PSYCH: normal mood      LABS:                        11.8   9.4   )-----------( 192      ( 30 Mar 2017 06:36 )             34.4     30 Mar 2017 06:33    134    |  102    |  20.0   ----------------------------<  133    4.7     |  23.0   |  0.94     Ca    8.9        30 Mar 2017 06:33    TPro  9.4    /  Alb  2.6    /  TBili  1.7    /  DBili  x      /  AST  64     /  ALT  42     /  AlkPhos  114    29 Mar 2017 14:32    PT/INR - ( 29 Mar 2017 14:32 )   PT: 15.3 sec;   INR: 1.38 ratio         PTT - ( 29 Mar 2017 14:32 )  PTT:29.9 sec    bld culture-  Gm (+) cocci in clusters x 2 from 3/29 sensitivity pending    MEDICATIONS  (STANDING):  docusate sodium 100milliGRAM(s) Oral three times a day  senna 2Tablet(s) Oral at bedtime  enoxaparin Injectable 40milliGRAM(s) SubCutaneous every 24 hours  sodium chloride 0.9%. 1000milliLiter(s) IV Continuous <Continuous>  insulin lispro (HumaLOG) corrective regimen sliding scale  SubCutaneous three times a day before meals  dextrose 5%. 1000milliLiter(s) IV Continuous <Continuous>  dextrose 50% Injectable 12.5Gram(s) IV Push once  dextrose 50% Injectable 25Gram(s) IV Push once  dextrose 50% Injectable 25Gram(s) IV Push once  lisinopril 20milliGRAM(s) Oral daily  insulin glargine Injectable (LANTUS) 30Unit(s) SubCutaneous at bedtime  ceFAZolin   IVPB 2000milliGRAM(s) IV Intermittent every 8 hours  lactated ringers. 1000milliLiter(s) IV Continuous <Continuous>    MEDICATIONS  (PRN):  oxyCODONE  5 mG/acetaminophen 325 mG 1Tablet(s) Oral every 6 hours PRN Mild Pain (1 - 3)  oxyCODONE  5 mG/acetaminophen 325 mG 2Tablet(s) Oral every 4 hours PRN Moderate Pain (4 - 6)  morphine  - Injectable 2milliGRAM(s) IV Push every 4 hours PRN Severe Pain (7 - 10)  ondansetron Injectable 4milliGRAM(s) IV Push every 4 hours PRN Nausea and/or Vomiting  acetaminophen   Tablet 650milliGRAM(s) Oral every 6 hours PRN For Temp greater than 38 C (100.4 F)  dextrose Gel 1Dose(s) Oral once PRN Blood Glucose LESS THAN 70 milliGRAM(s)/deciliter  glucagon  Injectable 1milliGRAM(s) IntraMuscular once PRN Glucose LESS THAN 70 milligrams/deciliter      RADIOLOGY & ADDITIONAL TESTS:    Lt LE doppler:  Unremarkable ultrasound of the bilateral lower extremity     CXR:  Small left pleural effusion.      Lt Knee xray:  o bone abnormality. Joint effusion      ID and Ortho consults appreciated  deep venous system.        Lt femur xray:  No bone abnormality. Joint effusion

## 2017-03-30 NOTE — PROGRESS NOTE ADULT - SUBJECTIVE AND OBJECTIVE BOX
Pt Name: MAGED NOBLE    MRN: [id number]      Patient is a being follwed for left knee pain and swelling. He reports of persistent pain and swelling of the left knee. No new orthopedic complaints.  Unable to comfortably move left knee. He has not tried to WB on the left knee.      PAST MEDICAL & SURGICAL HISTORY:  PAST MEDICAL & SURGICAL HISTORY:  DM (diabetes mellitus)  Alcohol abuse      Allergies: No Known Allergies      Medications: oxyCODONE  5 mG/acetaminophen 325 mG 1Tablet(s) Oral every 6 hours PRN  oxyCODONE  5 mG/acetaminophen 325 mG 2Tablet(s) Oral every 4 hours PRN  morphine  - Injectable 2milliGRAM(s) IV Push every 4 hours PRN  ondansetron Injectable 4milliGRAM(s) IV Push every 4 hours PRN  docusate sodium 100milliGRAM(s) Oral three times a day  senna 2Tablet(s) Oral at bedtime  enoxaparin Injectable 40milliGRAM(s) SubCutaneous every 24 hours  acetaminophen   Tablet 650milliGRAM(s) Oral every 6 hours PRN  sodium chloride 0.9%. 1000milliLiter(s) IV Continuous <Continuous>  insulin lispro (HumaLOG) corrective regimen sliding scale  SubCutaneous three times a day before meals  dextrose 5%. 1000milliLiter(s) IV Continuous <Continuous>  dextrose Gel 1Dose(s) Oral once PRN  dextrose 50% Injectable 12.5Gram(s) IV Push once  dextrose 50% Injectable 25Gram(s) IV Push once  dextrose 50% Injectable 25Gram(s) IV Push once  glucagon  Injectable 1milliGRAM(s) IntraMuscular once PRN  lisinopril 20milliGRAM(s) Oral daily  insulin glargine Injectable (LANTUS) 30Unit(s) SubCutaneous at bedtime  ceFAZolin   IVPB 2000milliGRAM(s) IV Intermittent every 8 hours                         12.7   11.8  )-----------( 235      ( 29 Mar 2017 14:32 )             36.4     30 Mar 2017 06:33    134    |  102    |  20.0   ----------------------------<  133    4.7     |  23.0   |  0.94     Ca    8.9        30 Mar 2017 06:33    TPro  9.4    /  Alb  2.6    /  TBili  1.7    /  DBili  x      /  AST  64     /  ALT  42     /  AlkPhos  114    29 Mar 2017 14:32      PHYSICAL EXAM:    Vital Signs Last 24 Hrs  T(C): 36.5, Max: 37.1 (03-29 @ 12:39)  T(F): 97.7, Max: 98.8 (03-29 @ 12:39)  HR: 90 (90 - 106)  BP: 144/84 (144/72 - 157/78)  BP(mean): --  RR: 20 (16 - 20)  SpO2: 95% (93% - 99%)  Daily Height in cm: 175.26 (29 Mar 2017 12:39)    Daily     Appearance: Alert, responsive, in no acute distress.    Neurological: Sensation is grossly intact to light touch. 5/5 motor function of all extremities. No focal deficits or weaknesses found.    Skin: no rash on visible skin. Skin is clean, dry and intact. No bleeding. No abrasions. No ulcerations.    Vascular: 2+ distal pulses. Cap refill < 2 sec. No signs of venous insufficiency or stasis. No extremity ulcerations. No cyanosis.    Musculoskeletal:         Left Lower Extremity: + moderate effusion noted today. + mild joint warmth. + improved extension (near -10 deg). + worsened flexion (less than 50 deg) No calf tenderness.          A/P:  Pt is a  81y Male with  left knee effusion.      PLAN:   *  discussed new effusion. Patient agreed to an aspiration.  * Will follow cell count and decide on management plan.      ARTHROCENTSIS  PROCEDURE NOTE: Arthrocentesis    Performed by: Dez Taylor PA-C    Indication: Effusion / rule out septic joint    Consent: the risks and benefits of arthrocentesis discussed with patient and daughter, including the risk of bleeding, infection, and technical failure. The risks of not performing the procedure, failure to diagnose septic joint with resultant systemic infection, discussed with the patient. The alternatives of performing the procedure also discussed. Written consent was obtained following the discussion.    Universal Protocol: A time out was performed and the correct patient and site were verified.    The left knee joint was prepped and draped in proper sterile fashion. The overlying skin was anesthetized with 10 ml of 1% lidocaine.  A 18 gauge needle was used to aspirate fluid from the joint using appropriate anatomic landmarks. 35ml of turbid green-yellow blood tinged fluid was obtained from the joint. Approximately 35 milliliters of fluid was obtained. The fluid was then sent to the lab for appropriate studies. The site was bandaged and no complications were noted. The patient tolerated the procedure well.    Post-Procedure Diagnosis: Effusion  Complications: None  Specimens Removed: fluid only  Prosthetic devices/implants:  none Pt Name: MAGED NOBLE    MRN: [id number]      Patient is a being follwed for left knee pain and swelling. He reports of persistent pain and swelling of the left knee. No new orthopedic complaints.  Unable to comfortably move left knee. He has not tried to WB on the left knee.      PAST MEDICAL & SURGICAL HISTORY:  PAST MEDICAL & SURGICAL HISTORY:  DM (diabetes mellitus)  Alcohol abuse      Allergies: No Known Allergies      Medications: oxyCODONE  5 mG/acetaminophen 325 mG 1Tablet(s) Oral every 6 hours PRN  oxyCODONE  5 mG/acetaminophen 325 mG 2Tablet(s) Oral every 4 hours PRN  morphine  - Injectable 2milliGRAM(s) IV Push every 4 hours PRN  ondansetron Injectable 4milliGRAM(s) IV Push every 4 hours PRN  docusate sodium 100milliGRAM(s) Oral three times a day  senna 2Tablet(s) Oral at bedtime  enoxaparin Injectable 40milliGRAM(s) SubCutaneous every 24 hours  acetaminophen   Tablet 650milliGRAM(s) Oral every 6 hours PRN  sodium chloride 0.9%. 1000milliLiter(s) IV Continuous <Continuous>  insulin lispro (HumaLOG) corrective regimen sliding scale  SubCutaneous three times a day before meals  dextrose 5%. 1000milliLiter(s) IV Continuous <Continuous>  dextrose Gel 1Dose(s) Oral once PRN  dextrose 50% Injectable 12.5Gram(s) IV Push once  dextrose 50% Injectable 25Gram(s) IV Push once  dextrose 50% Injectable 25Gram(s) IV Push once  glucagon  Injectable 1milliGRAM(s) IntraMuscular once PRN  lisinopril 20milliGRAM(s) Oral daily  insulin glargine Injectable (LANTUS) 30Unit(s) SubCutaneous at bedtime  ceFAZolin   IVPB 2000milliGRAM(s) IV Intermittent every 8 hours                         12.7   11.8  )-----------( 235      ( 29 Mar 2017 14:32 )             36.4     30 Mar 2017 06:33    134    |  102    |  20.0   ----------------------------<  133    4.7     |  23.0   |  0.94     Ca    8.9        30 Mar 2017 06:33    TPro  9.4    /  Alb  2.6    /  TBili  1.7    /  DBili  x      /  AST  64     /  ALT  42     /  AlkPhos  114    29 Mar 2017 14:32      PHYSICAL EXAM:    Vital Signs Last 24 Hrs  T(C): 36.5, Max: 37.1 (03-29 @ 12:39)  T(F): 97.7, Max: 98.8 (03-29 @ 12:39)  HR: 90 (90 - 106)  BP: 144/84 (144/72 - 157/78)  BP(mean): --  RR: 20 (16 - 20)  SpO2: 95% (93% - 99%)  Daily Height in cm: 175.26 (29 Mar 2017 12:39)    Daily     Appearance: Alert, responsive, in no acute distress.    Neurological: Sensation is grossly intact to light touch. 5/5 motor function of all extremities. No focal deficits or weaknesses found.    Skin: no rash on visible skin. Skin is clean, dry and intact. No bleeding. No abrasions. No ulcerations.    Vascular: 2+ distal pulses. Cap refill < 2 sec. No signs of venous insufficiency or stasis. No extremity ulcerations. No cyanosis.    Musculoskeletal:         Left Lower Extremity: + moderate effusion noted today. + mild joint warmth. + improved extension (near -10 deg). + worsened flexion (less than 50 deg) No calf tenderness.          A/P:  Pt is a  81y Male with  left knee effusion.      PLAN:   *  discussed new effusion. Patient agreed to an aspiration.  * Will follow cell count and decide on management plan.      ARTHROCENTSIS  PROCEDURE NOTE: Arthrocentesis    Performed by: Dez Taylor PA-C    Indication: Effusion / rule out septic joint    Consent: the risks and benefits of arthrocentesis discussed with patient and daughter, including the risk of bleeding, infection, and technical failure. The risks of not performing the procedure, failure to diagnose septic joint with resultant systemic infection, discussed with the patient. The alternatives of performing the procedure also discussed. Written consent was obtained following the discussion.    Universal Protocol: A time out was performed and the correct patient and site were verified.    The left knee joint was prepped and draped in proper sterile fashion. The overlying skin was anesthetized with 10 ml of 1% lidocaine.  A 18 gauge needle was used to aspirate fluid from the joint using appropriate anatomic landmarks. 35ml of turbid green-yellow blood tinged fluid was obtained from the joint. Approximately 35 milliliters of fluid was obtained. The fluid was then sent to the lab for appropriate studies. The site was bandaged and no complications were noted. The patient tolerated the procedure well.    Post-Procedure Diagnosis: Effusion  Complications: None  Specimens Removed: fluid only  Prosthetic devices/implants:  none    Patient seen in the emergency department aspirated fluid was cloudy possibly currently also be gouty type issues however well we will wait on a cell count and prior to scheduling operative management such as irrigation and debridement of this knee

## 2017-03-30 NOTE — PROGRESS NOTE ADULT - SUBJECTIVE AND OBJECTIVE BOX
HEALTH ISSUES - PROBLEM Dx:  HPI:  Pt has had left knee pain since Monday gradually worsening pain and edema over the days. He saw his PMD who ordered MRI and sent him to Ortho. Ortho tapped the knee outpatient that grew MSSA per Dr Doe. MRi showed torn meniscus and surgical repair was planned for this week. He was given Keflex and pain meds which he took for 4 days. He also had a Doppler study that was neg for DVT. But since pain worsened he came here. no fever, trauma, or any other associated symptoms  PMH- DM2, HTN ?, heart murmur, alc cirrhosis  SH- quit tobacco 50 yrs ago, alcohol use most days of the week. since last monday has not used any, not in withdrawal  Meds- obtained from pharmacy (29 Mar 2017 18:20)    NOW  septic left knee, bacteremia    INTERVAL HPI/ OVERNIGHT EVENTS:  says pain improved with morphine, but needing it frequently.  denies chest pain, nausea, vomits, cough, SOB, fever, HA    MEDICATIONS  (STANDING):  docusate sodium 100milliGRAM(s) Oral three times a day  senna 2Tablet(s) Oral at bedtime  enoxaparin Injectable 40milliGRAM(s) SubCutaneous every 24 hours  sodium chloride 0.9%. 1000milliLiter(s) IV Continuous <Continuous>  insulin lispro (HumaLOG) corrective regimen sliding scale  SubCutaneous three times a day before meals  dextrose 5%. 1000milliLiter(s) IV Continuous <Continuous>  dextrose 50% Injectable 12.5Gram(s) IV Push once  dextrose 50% Injectable 25Gram(s) IV Push once  dextrose 50% Injectable 25Gram(s) IV Push once  lisinopril 20milliGRAM(s) Oral daily  insulin glargine Injectable (LANTUS) 30Unit(s) SubCutaneous at bedtime  ceFAZolin   IVPB 2000milliGRAM(s) IV Intermittent every 8 hours  lactated ringers. 1000milliLiter(s) IV Continuous <Continuous>    MEDICATIONS  (PRN):  oxyCODONE  5 mG/acetaminophen 325 mG 1Tablet(s) Oral every 6 hours PRN Mild Pain (1 - 3)  oxyCODONE  5 mG/acetaminophen 325 mG 2Tablet(s) Oral every 4 hours PRN Moderate Pain (4 - 6)  morphine  - Injectable 2milliGRAM(s) IV Push every 4 hours PRN Severe Pain (7 - 10)  ondansetron Injectable 4milliGRAM(s) IV Push every 4 hours PRN Nausea and/or Vomiting  acetaminophen   Tablet 650milliGRAM(s) Oral every 6 hours PRN For Temp greater than 38 C (100.4 F)  dextrose Gel 1Dose(s) Oral once PRN Blood Glucose LESS THAN 70 milliGRAM(s)/deciliter  glucagon  Injectable 1milliGRAM(s) IntraMuscular once PRN Glucose LESS THAN 70 milligrams/deciliter      Allergies    No Known Allergies    Intolerances        Vital Signs Last 24 Hrs  T(C): 36.6, Max: 37.1 (03-30 @ 00:31)  T(F): 97.9, Max: 98.8 (03-30 @ 00:31)  HR: 90 (90 - 106)  BP: 167/79 (144/72 - 167/79)  BP(mean): --  RR: 18 (16 - 20)  SpO2: 95% (93% - 99%)    ACCUCHECKS    PHYSICAL EXAM-  GENERAL: currently taken morhine for pain and feels better, well-groomed, well-developed  HEAD:  Atraumatic, Normocephalic  EYES: EOMI, PERRLA, conjunctiva and sclera clear  ENMT: No tonsillar erythema, exudates, or enlargement; Moist mucous membranes,   NECK: Supple, No JVD, Normal thyroid  NERVOUS SYSTEM:  Alert & Oriented X 3, Motor Strength 4/5 B/L upper and lower extremities;   CHEST/LUNG: Clear to percussion bilaterally; No rales, rhonchi, wheezing, or rubs  HEART: Regular rate and rhythm; No murmurs, rubs, or gallops  ABDOMEN: Soft, Nontender, Nondistended; Bowel sounds present  EXTREMITIES:  2+ Peripheral Pulses, No clubbing, cyanosis; Left Knee swelling, warm, tender, ascending up the thigh  LYMPH: No lymphadenopathy noted  SKIN: No rashes or lesions    LABS:                        11.8   9.4   )-----------( 192      ( 30 Mar 2017 06:36 )             34.4     30 Mar 2017 06:33    134    |  102    |  20.0   ----------------------------<  133    4.7     |  23.0   |  0.94     Ca    8.9        30 Mar 2017 06:33    TPro  9.4    /  Alb  2.6    /  TBili  1.7    /  DBili  x      /  AST  64     /  ALT  42     /  AlkPhos  114    29 Mar 2017 14:32    PT/INR - ( 29 Mar 2017 14:32 )   PT: 15.3 sec;   INR: 1.38 ratio         PTT - ( 29 Mar 2017 14:32 )  PTT:29.9 sec    RADIOLOGY & ADDITIONAL TESTS:    Assessment and Plan  DVT Prophylaxis lovenox    Discussed with: Patient, family, RN, CM, Consultants  Plan of care/ Discharge planning discussed.    Visit Time:

## 2017-03-30 NOTE — PROGRESS NOTE ADULT - SUBJECTIVE AND OBJECTIVE BOX
Patient is a 81y old  Male who presents with a chief complaint of knee pain (29 Mar 2017 18:20) and  he has a swollen left knee and a diagnosis of a left knee meniscal tear.  He is scheduled to have a   LEFT KNEE IRRIGATION AND DEBRIDEMENT.       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 1000milliLiter(s) IV Continuous <Continuous>  insulin lispro (HumaLOG) corrective regimen sliding scale  SubCutaneous three times a day before meals  dextrose 5%. 1000milliLiter(s) IV Continuous <Continuous>  dextrose 50% Injectable 12.5Gram(s) IV Push once  dextrose 50% Injectable 25Gram(s) IV Push once  dextrose 50% Injectable 25Gram(s) IV Push once  lisinopril 20milliGRAM(s) Oral daily  insulin glargine Injectable (LANTUS) 30Unit(s) SubCutaneous at bedtime  ceFAZolin   IVPB 2000milliGRAM(s) IV Intermittent every 8 hours  lactated ringers. 1000milliLiter(s) IV Continuous <Continuous>    MEDICATIONS  (PRN):  oxyCODONE  5 mG/acetaminophen 325 mG 1Tablet(s) Oral every 6 hours PRN Mild Pain (1 - 3)  oxyCODONE  5 mG/acetaminophen 325 mG 2Tablet(s) Oral every 4 hours PRN Moderate Pain (4 - 6)  morphine  - Injectable 2milliGRAM(s) IV Push every 4 hours PRN Severe Pain (7 - 10)  ondansetron Injectable 4milliGRAM(s) IV Push every 4 hours PRN Nausea and/or Vomiting  acetaminophen   Tablet 650milliGRAM(s) Oral every 6 hours PRN For Temp greater than 38 C (100.4 F)  dextrose Gel 1Dose(s) Oral once PRN Blood Glucose LESS THAN 70 milliGRAM(s)/deciliter  glucagon  Injectable 1milliGRAM(s) IntraMuscular once PRN Glucose LESS THAN 70 milligrams/deciliter      Allergies:  No Known Drug Allergies      SOCIAL HISTORY:  The patient would routinely have 2 to 3 drinks after work.  He quit smoking 50                                  years ago after smoking 1/2 ppd x 20 years.  He never used illicit drugs.                          11.8   9.4   )-----------( 192      ( 30 Mar 2017 06:36 )             34.4       PT/INR - ( 29 Mar 2017 14:32 )   PT: 15.3 sec;   INR: 1.38 ratio         PTT - ( 29 Mar 2017 14:32 )  PTT:29.9 sec    30 Mar 2017 06:33    134    |  102    |  20.0   ----------------------------<  133    4.7     |  23.0   |  0.94     Ca    8.9        30 Mar 2017 06:33    TPro  9.4    /  Alb  2.6    /  TBili  1.7    /  DBili  x      /  AST  64     /  ALT  42     /  AlkPhos  114    29 Mar 2017 14:32    EKG: 3/30/2017  Sinus tachycardia  Left axis deviation  Anteroseptal infarct , age undetermined       TT ECHO:   ECHO TRANSTHORACIC  -    Mar 30 2017    Summary:   2. Endocardial visualization was enhanced with intravenous echo contrast.   3. Normal biventricular systolic function. Left ventricular ejection        fraction, by visual estimation, is 60 to 65%.   4. Spectral Doppler shows impaired relaxation pattern of left        ventricular myocardial filling (Grade I diastolic dysfunction).   5. No significant valvular disease.     EXAM:  XR FEMUR 2 VIEWS LT                         EXAM:  XR KNEE 3 VIEWS LT        03/29/2017    Impression:  No bone abnormality. Joint effusion    ASA # =  3   Mallampati # =  3    (Permanent implants in upper and lower jaw.)

## 2017-03-31 DIAGNOSIS — M00.062 STAPHYLOCOCCAL ARTHRITIS, LEFT KNEE: ICD-10-CM

## 2017-03-31 DIAGNOSIS — A41.9 SEPSIS, UNSPECIFIED ORGANISM: ICD-10-CM

## 2017-03-31 DIAGNOSIS — R78.81 BACTEREMIA: ICD-10-CM

## 2017-03-31 LAB
-  AMPICILLIN/SULBACTAM: SIGNIFICANT CHANGE UP
-  AMPICILLIN/SULBACTAM: SIGNIFICANT CHANGE UP
-  CEFAZOLIN: SIGNIFICANT CHANGE UP
-  CEFAZOLIN: SIGNIFICANT CHANGE UP
-  CIPROFLOXACIN: SIGNIFICANT CHANGE UP
-  CIPROFLOXACIN: SIGNIFICANT CHANGE UP
-  CLINDAMYCIN: SIGNIFICANT CHANGE UP
-  CLINDAMYCIN: SIGNIFICANT CHANGE UP
-  ERYTHROMYCIN: SIGNIFICANT CHANGE UP
-  ERYTHROMYCIN: SIGNIFICANT CHANGE UP
-  GENTAMICIN: SIGNIFICANT CHANGE UP
-  GENTAMICIN: SIGNIFICANT CHANGE UP
-  LEVOFLOXACIN: SIGNIFICANT CHANGE UP
-  LEVOFLOXACIN: SIGNIFICANT CHANGE UP
-  MOXIFLOXACIN(AEROBIC): SIGNIFICANT CHANGE UP
-  MOXIFLOXACIN(AEROBIC): SIGNIFICANT CHANGE UP
-  OXACILLIN: SIGNIFICANT CHANGE UP
-  OXACILLIN: SIGNIFICANT CHANGE UP
-  PENICILLIN: SIGNIFICANT CHANGE UP
-  PENICILLIN: SIGNIFICANT CHANGE UP
-  RIFAMPIN: SIGNIFICANT CHANGE UP
-  RIFAMPIN: SIGNIFICANT CHANGE UP
-  TETRACYCLINE: SIGNIFICANT CHANGE UP
-  TETRACYCLINE: SIGNIFICANT CHANGE UP
-  TRIMETHOPRIM/SULFAMETHOXAZOLE: SIGNIFICANT CHANGE UP
-  TRIMETHOPRIM/SULFAMETHOXAZOLE: SIGNIFICANT CHANGE UP
-  VANCOMYCIN: SIGNIFICANT CHANGE UP
-  VANCOMYCIN: SIGNIFICANT CHANGE UP
ANION GAP SERPL CALC-SCNC: 11 MMOL/L — SIGNIFICANT CHANGE UP (ref 5–17)
APTT BLD: 36.4 SEC — SIGNIFICANT CHANGE UP (ref 27.5–37.4)
BUN SERPL-MCNC: 18 MG/DL — SIGNIFICANT CHANGE UP (ref 8–20)
CALCIUM SERPL-MCNC: 8.8 MG/DL — SIGNIFICANT CHANGE UP (ref 8.6–10.2)
CHLORIDE SERPL-SCNC: 101 MMOL/L — SIGNIFICANT CHANGE UP (ref 98–107)
CO2 SERPL-SCNC: 23 MMOL/L — SIGNIFICANT CHANGE UP (ref 22–29)
CREAT SERPL-MCNC: 0.91 MG/DL — SIGNIFICANT CHANGE UP (ref 0.5–1.3)
CULTURE RESULTS: SIGNIFICANT CHANGE UP
GLUCOSE SERPL-MCNC: 100 MG/DL — SIGNIFICANT CHANGE UP (ref 70–115)
GRAM STN FLD: SIGNIFICANT CHANGE UP
HCT VFR BLD CALC: 31.7 % — LOW (ref 42–52)
HGB BLD-MCNC: 10.7 G/DL — LOW (ref 14–18)
INR BLD: 1.49 RATIO — HIGH (ref 0.88–1.16)
MCHC RBC-ENTMCNC: 33.8 G/DL — SIGNIFICANT CHANGE UP (ref 32–36)
MCHC RBC-ENTMCNC: 33.8 PG — HIGH (ref 27–31)
MCV RBC AUTO: 100 FL — HIGH (ref 80–94)
METHOD TYPE: SIGNIFICANT CHANGE UP
METHOD TYPE: SIGNIFICANT CHANGE UP
ORGANISM # SPEC MICROSCOPIC CNT: SIGNIFICANT CHANGE UP
ORGANISM # SPEC MICROSCOPIC CNT: SIGNIFICANT CHANGE UP
PLATELET # BLD AUTO: 171 K/UL — SIGNIFICANT CHANGE UP (ref 150–400)
POTASSIUM SERPL-MCNC: 4.5 MMOL/L — SIGNIFICANT CHANGE UP (ref 3.5–5.3)
POTASSIUM SERPL-SCNC: 4.5 MMOL/L — SIGNIFICANT CHANGE UP (ref 3.5–5.3)
PROTHROM AB SERPL-ACNC: 16.5 SEC — HIGH (ref 9.8–12.7)
RBC # BLD: 3.17 M/UL — LOW (ref 4.6–6.2)
RBC # FLD: 13.7 % — SIGNIFICANT CHANGE UP (ref 11–15.6)
SODIUM SERPL-SCNC: 135 MMOL/L — SIGNIFICANT CHANGE UP (ref 135–145)
SPECIMEN SOURCE: SIGNIFICANT CHANGE UP
TYPE + AB SCN PNL BLD: SIGNIFICANT CHANGE UP
WBC # BLD: 8.8 K/UL — SIGNIFICANT CHANGE UP (ref 4.8–10.8)
WBC # FLD AUTO: 8.8 K/UL — SIGNIFICANT CHANGE UP (ref 4.8–10.8)

## 2017-03-31 PROCEDURE — 27334 REMOVE KNEE JOINT LINING: CPT | Mod: LT

## 2017-03-31 PROCEDURE — 99233 SBSQ HOSP IP/OBS HIGH 50: CPT

## 2017-03-31 PROCEDURE — 27334 REMOVE KNEE JOINT LINING: CPT | Mod: AS,LT

## 2017-03-31 RX ORDER — FENTANYL CITRATE 50 UG/ML
50 INJECTION INTRAVENOUS
Qty: 0 | Refills: 0 | Status: DISCONTINUED | OUTPATIENT
Start: 2017-03-31 | End: 2017-03-31

## 2017-03-31 RX ORDER — OXYCODONE HYDROCHLORIDE 5 MG/1
10 TABLET ORAL
Qty: 0 | Refills: 0 | Status: DISCONTINUED | OUTPATIENT
Start: 2017-03-31 | End: 2017-04-07

## 2017-03-31 RX ORDER — ENOXAPARIN SODIUM 100 MG/ML
30 INJECTION SUBCUTANEOUS EVERY 12 HOURS
Qty: 0 | Refills: 0 | Status: DISCONTINUED | OUTPATIENT
Start: 2017-04-01 | End: 2017-04-08

## 2017-03-31 RX ORDER — VANCOMYCIN HCL 1 G
1000 VIAL (EA) INTRAVENOUS
Qty: 0 | Refills: 0 | Status: DISCONTINUED | OUTPATIENT
Start: 2017-03-31 | End: 2017-04-01

## 2017-03-31 RX ORDER — GLUCAGON INJECTION, SOLUTION 0.5 MG/.1ML
1 INJECTION, SOLUTION SUBCUTANEOUS ONCE
Qty: 0 | Refills: 0 | Status: DISCONTINUED | OUTPATIENT
Start: 2017-03-31 | End: 2017-04-11

## 2017-03-31 RX ORDER — INSULIN LISPRO 100/ML
VIAL (ML) SUBCUTANEOUS
Qty: 0 | Refills: 0 | Status: DISCONTINUED | OUTPATIENT
Start: 2017-03-31 | End: 2017-04-11

## 2017-03-31 RX ORDER — SODIUM CHLORIDE 9 MG/ML
1000 INJECTION, SOLUTION INTRAVENOUS
Qty: 0 | Refills: 0 | Status: DISCONTINUED | OUTPATIENT
Start: 2017-03-31 | End: 2017-04-03

## 2017-03-31 RX ORDER — OXYCODONE HYDROCHLORIDE 5 MG/1
5 TABLET ORAL
Qty: 0 | Refills: 0 | Status: DISCONTINUED | OUTPATIENT
Start: 2017-03-31 | End: 2017-04-01

## 2017-03-31 RX ORDER — DEXTROSE 50 % IN WATER 50 %
25 SYRINGE (ML) INTRAVENOUS ONCE
Qty: 0 | Refills: 0 | Status: DISCONTINUED | OUTPATIENT
Start: 2017-03-31 | End: 2017-04-11

## 2017-03-31 RX ORDER — VANCOMYCIN HCL 1 G
1000 VIAL (EA) INTRAVENOUS EVERY 12 HOURS
Qty: 0 | Refills: 0 | Status: DISCONTINUED | OUTPATIENT
Start: 2017-03-31 | End: 2017-03-31

## 2017-03-31 RX ORDER — VANCOMYCIN HCL 1 G
1000 VIAL (EA) INTRAVENOUS ONCE
Qty: 0 | Refills: 0 | Status: COMPLETED | OUTPATIENT
Start: 2017-03-31 | End: 2017-03-31

## 2017-03-31 RX ORDER — HYDROMORPHONE HYDROCHLORIDE 2 MG/ML
1 INJECTION INTRAMUSCULAR; INTRAVENOUS; SUBCUTANEOUS
Qty: 0 | Refills: 0 | Status: DISCONTINUED | OUTPATIENT
Start: 2017-03-31 | End: 2017-03-31

## 2017-03-31 RX ORDER — SODIUM CHLORIDE 9 MG/ML
1000 INJECTION INTRAMUSCULAR; INTRAVENOUS; SUBCUTANEOUS
Qty: 0 | Refills: 0 | Status: DISCONTINUED | OUTPATIENT
Start: 2017-03-31 | End: 2017-03-31

## 2017-03-31 RX ORDER — LISINOPRIL 2.5 MG/1
20 TABLET ORAL DAILY
Qty: 0 | Refills: 0 | Status: DISCONTINUED | OUTPATIENT
Start: 2017-03-31 | End: 2017-04-01

## 2017-03-31 RX ORDER — CEFAZOLIN SODIUM 1 G
2000 VIAL (EA) INJECTION
Qty: 0 | Refills: 0 | Status: DISCONTINUED | OUTPATIENT
Start: 2017-03-31 | End: 2017-04-11

## 2017-03-31 RX ORDER — DEXTROSE 50 % IN WATER 50 %
1 SYRINGE (ML) INTRAVENOUS ONCE
Qty: 0 | Refills: 0 | Status: DISCONTINUED | OUTPATIENT
Start: 2017-03-31 | End: 2017-04-11

## 2017-03-31 RX ORDER — HYDROMORPHONE HYDROCHLORIDE 2 MG/ML
0.5 INJECTION INTRAMUSCULAR; INTRAVENOUS; SUBCUTANEOUS EVERY 4 HOURS
Qty: 0 | Refills: 0 | Status: DISCONTINUED | OUTPATIENT
Start: 2017-03-31 | End: 2017-04-07

## 2017-03-31 RX ORDER — ONDANSETRON 8 MG/1
4 TABLET, FILM COATED ORAL EVERY 6 HOURS
Qty: 0 | Refills: 0 | Status: DISCONTINUED | OUTPATIENT
Start: 2017-03-31 | End: 2017-04-11

## 2017-03-31 RX ORDER — DEXTROSE 50 % IN WATER 50 %
12.5 SYRINGE (ML) INTRAVENOUS ONCE
Qty: 0 | Refills: 0 | Status: DISCONTINUED | OUTPATIENT
Start: 2017-03-31 | End: 2017-04-11

## 2017-03-31 RX ORDER — ONDANSETRON 8 MG/1
4 TABLET, FILM COATED ORAL ONCE
Qty: 0 | Refills: 0 | Status: DISCONTINUED | OUTPATIENT
Start: 2017-03-31 | End: 2017-03-31

## 2017-03-31 RX ADMIN — Medication 2: at 16:45

## 2017-03-31 RX ADMIN — Medication 100 MILLIGRAM(S): at 04:47

## 2017-03-31 RX ADMIN — LISINOPRIL 20 MILLIGRAM(S): 2.5 TABLET ORAL at 04:48

## 2017-03-31 RX ADMIN — HYDROMORPHONE HYDROCHLORIDE 1 MILLIGRAM(S): 2 INJECTION INTRAMUSCULAR; INTRAVENOUS; SUBCUTANEOUS at 13:30

## 2017-03-31 RX ADMIN — MORPHINE SULFATE 2 MILLIGRAM(S): 50 CAPSULE, EXTENDED RELEASE ORAL at 14:29

## 2017-03-31 RX ADMIN — Medication 100 MILLIGRAM(S): at 20:00

## 2017-03-31 RX ADMIN — OXYCODONE HYDROCHLORIDE 10 MILLIGRAM(S): 5 TABLET ORAL at 13:17

## 2017-03-31 RX ADMIN — SODIUM CHLORIDE 80 MILLILITER(S): 9 INJECTION, SOLUTION INTRAVENOUS at 01:34

## 2017-03-31 RX ADMIN — OXYCODONE HYDROCHLORIDE 10 MILLIGRAM(S): 5 TABLET ORAL at 13:30

## 2017-03-31 RX ADMIN — HYDROMORPHONE HYDROCHLORIDE 1 MILLIGRAM(S): 2 INJECTION INTRAMUSCULAR; INTRAVENOUS; SUBCUTANEOUS at 13:17

## 2017-03-31 RX ADMIN — LISINOPRIL 20 MILLIGRAM(S): 2.5 TABLET ORAL at 16:51

## 2017-03-31 RX ADMIN — Medication 250 MILLIGRAM(S): at 09:40

## 2017-03-31 RX ADMIN — Medication 100 MILLIGRAM(S): at 12:40

## 2017-03-31 RX ADMIN — OXYCODONE HYDROCHLORIDE 10 MILLIGRAM(S): 5 TABLET ORAL at 16:53

## 2017-03-31 RX ADMIN — OXYCODONE HYDROCHLORIDE 10 MILLIGRAM(S): 5 TABLET ORAL at 16:51

## 2017-03-31 RX ADMIN — Medication 250 MILLIGRAM(S): at 20:58

## 2017-03-31 RX ADMIN — MORPHINE SULFATE 2 MILLIGRAM(S): 50 CAPSULE, EXTENDED RELEASE ORAL at 09:04

## 2017-03-31 NOTE — PROGRESS NOTE ADULT - PROBLEM SELECTOR PLAN 2
Blood culture from Outside lab done from Dr Doe's office with Staph Aureus likely MSSA given synovial fluid culture  Blood cultures from admission also positive with GPC in clusters  Will Add vancomycin until cultures finalize  Will repeat blood cultures till they are negative  TTE with no evidence of Vegetation

## 2017-03-31 NOTE — PROGRESS NOTE ADULT - SUBJECTIVE AND OBJECTIVE BOX
[facility]  INFECTIOUS DISEASES AND INTERNAL MEDICINE OF [place]  =======================================================  Ketna Ramesh MD  Diplomates American Board of Internal Medicine and Infectious Diseases  =======================================================    MAGED NOBLE     still with left knee pain  No fevers    Allergies:  No Known Allergies      Antibiotics:  ceFAZolin   IVPB 2000milliGRAM(s) IV Intermittent every 8 hours         REVIEW OF SYSTEMS:  CONSTITUTIONAL:  No chills  HEENT:   No diplopia or blurred vision. No earache, sore throat or runny nose.  CARDIOVASCULAR:  No pressure, squeezing, strangling, tightness, heaviness or aching about the chest, neck, axilla or epigastrium.  RESPIRATORY:  No cough, shortness of breat  GASTROINTESTINAL:  No nausea, vomiting or diarrhea.  GENITOURINARY:  No dysuria, frequency or urgency.   MUSCULOSKELETAL:  Left knee pain  SKIN:  No change in skin, hair or nails.  NEUROLOGIC:  No paresthesias, fasciculations  PSYCHIATRIC:  No disorder of thought or mood.  ENDOCRINE:  No heat or cold intolerance, polyuria or polydipsia.  HEMATOLOGICAL:  No easy bruising or bleeding.      Physical Exam:  Vital Signs Last 24 Hrs  T(C): 37, Max: 37 (03-31 @ 08:19)  T(F): 98.6, Max: 98.6 (03-31 @ 08:19)  HR: 92 (90 - 95)  BP: 144/63 (120/68 - 175/82)  RR: 18 (18 - 18)  SpO2: 97% (95% - 97%)    GEN: NAD, pleasant  HEENT: normocephalic and atraumatic. EOMI. PERRL.    NECK: Supple.   LUNGS: Clear to auscultation.  HEART: Regular rate and rhythm  ABDOMEN: Soft, nontender, and nondistended.  Positive bowel sounds.    : no CVA tenderness  EXTREMITIES: LLE knee swelling and leg swelling  MSK: Left knee joint effusion and swelling  NEUROLOGIC: AAOx3  PSYCHIATRIC: Appropriate affect .  SKIN: Left knee redness and warmth        Labs:  30 Mar 2017 06:33    134    |  102    |  20.0   ----------------------------<  133    4.7     |  23.0   |  0.94     Ca    8.9        30 Mar 2017 06:33    TPro  9.4    /  Alb  2.6    /  TBili  1.7    /  DBili  x      /  AST  64     /  ALT  42     /  AlkPhos  114    29 Mar 2017 14:32                          11.8   9.4   )-----------( 192      ( 30 Mar 2017 06:36 )             34.4       PT/INR - ( 29 Mar 2017 14:32 )   PT: 15.3 sec;   INR: 1.38 ratio         PTT - ( 29 Mar 2017 14:32 )  PTT:29.9 sec    LIVER FUNCTIONS - ( 29 Mar 2017 14:32 )  Alb: 2.6 g/dL / Pro: 9.4 g/dL / ALK PHOS: 114 U/L / ALT: 42 U/L / AST: 64 U/L / GGT: x             RECENT CULTURES:  03-30 .Body Fluid Synovial Fluid XXXX   Numerous white blood cells  No organisms seen XXXX    03-29 .Blood Blood XXXX XXXX   Growth in aerobic bottle: Gram Positive Cocci in Clusters  Aerobic Bottle: 14.18 Hours to positivity  Anaerobic Bottle: Gram Positive Cocci in Clusters  Anaerobic Bottle: 1 day 12:02 Hours to positivity  .  TYPE: (C=Critical, N=Notification, A=Abnor    03-29 .Blood Blood Staphylococcus aureus XXXX   Growth in aerobic and anaerobic bottles: Gram Positive Cocci in Clusters  Aerobic Bottle: 14.38 Hours to positivity  Anaerobic Bottle: 1 day 2 Hours to positivity  .  TYPE: (C=Critical, N=Notification, A=Abnormal) C  TESTS:  _ GS  DATE/TIME CALLED:    Cultures from Dr Doe's office:  03/28 Blood cultures  No growth x 3 days    03/28 Blood cultures  Staphylococcus Aureus     3/24 Left knee aspiration cx  MSSA      TTE   Summary:   1. Technically difficult study.   2. Endocardial visualization was enhanced with intravenous echo contrast.   3. Normal biventricular systolic function. Left ventricular ejection   fraction, by visual estimation, is 60 to 65%.   4. Spectral Doppler shows impaired relaxation pattern of left   ventricular myocardial filling (Grade I diastolic dysfunction).   5. No significant valvular disease.   6. No pericardial effusion.   7. ** No prior echocardiograms available for comparison.

## 2017-03-31 NOTE — PROGRESS NOTE ADULT - ASSESSMENT
1. Septic knee, bacteremia- MSSA by cultures. Ancef by ID. knee arthrotomy with washout today  2. Pain issues- percocet and morphine, zofran, laxatives, IVF. expalined the need to take Po pain meds for mild and moderate pain and not wait for it to become severe.  3. Heart murmur- TTE ordered. if needed will consider MICAELA.   4. DM2- HISS  5. Alc Cirrhosis- avoid hepatotoxics.   Lovenox

## 2017-03-31 NOTE — PROGRESS NOTE ADULT - SUBJECTIVE AND OBJECTIVE BOX
Ortho Post Op Check    Name: MAGED NOBLE    MR #: [id number]    Procedure: left knee I&D  Surgeon: Dr. Lind    Pt comfortable without complaints, pain controlled  Denies CP, SOB, N/V, numbness/tingling     General Exam:  Vital Signs Last 24 Hrs  T(C): 36.6, Max: 36.6 (03-31 @ 20:14)  T(F): 97.9, Max: 97.9 (03-31 @ 20:14)  HR: 84 (84 - 84)  BP: 154/76 (154/76 - 154/76)  BP(mean): --  RR: 18 (18 - 18)  SpO2: 94% (94% - 94%)  Wt(kg): --    General: Pt Alert and oriented, NAD, controlled pain.  Dressing C/D/I. No bleeding.  Hemovac intact and functioning  Pulses: dorsalis pedis pulse intact. Cap refill < 2 sec.  Sensation: Grossly intact to light touch without deficit.  Motor: + EHL/FHL/TA/GS                        10.7   8.8   )-----------( 171      ( 31 Mar 2017 09:00 )             31.7   31 Mar 2017 09:00    135    |  101    |  18.0   ----------------------------<  100    4.5     |  23.0   |  0.91     A/P: 81yMale POD#0 s/p Left knee I&D  - Stable  - Pain Control  - DVT ppx: Lovenox 30 BID, SCD's  - Continuous IV Ancef and Vanco   - F/U OR Cultures  - Weight bearing status: NWB LLE  - Continue care per primary team

## 2017-03-31 NOTE — PROGRESS NOTE ADULT - SUBJECTIVE AND OBJECTIVE BOX
HEALTH ISSUES - PROBLEM Dx:  HPI:  Pt has had left knee pain since Monday gradually worsening pain and edema over the days. He saw his PMD who ordered MRI and sent him to Ortho. Ortho tapped the knee outpatient that grew MSSA per Dr Doe. MRi showed torn meniscus and surgical repair was planned for this week. He was given Keflex and pain meds which he took for 4 days. He also had a Doppler study that was neg for DVT. But since pain worsened he came here. no fever, trauma, or any other associated symptoms  PMH- DM2, HTN ?, heart murmur, alc cirrhosis  SH- quit tobacco 50 yrs ago, alcohol use most days of the week. since last monday has not used any, not in withdrawal  Meds- obtained from pharmacy (29 Mar 2017 18:20)    NOW  arthrotomy today for washout    INTERVAL HPI/ OVERNIGHT EVENTS:  comfortable only with pain meds. and using pain meds frequently  denies chest pain, nausea, vomits, cough, SOB, fever, HA    MEDICATIONS  (STANDING):  ceFAZolin   IVPB 2000milliGRAM(s) IV Intermittent <User Schedule>  vancomycin  IVPB 1000milliGRAM(s) IV Intermittent <User Schedule>  lisinopril 20milliGRAM(s) Oral daily  insulin lispro (HumaLOG) corrective regimen sliding scale  SubCutaneous three times a day before meals  dextrose 50% Injectable 12.5Gram(s) IV Push once  dextrose 50% Injectable 25Gram(s) IV Push once  dextrose 50% Injectable 25Gram(s) IV Push once  lactated ringers. 1000milliLiter(s) IV Continuous <Continuous>    MEDICATIONS  (PRN):  dextrose Gel 1Dose(s) Oral once PRN Blood Glucose LESS THAN 70 milliGRAM(s)/deciliter  glucagon  Injectable 1milliGRAM(s) IntraMuscular once PRN Glucose LESS THAN 70 milligrams/deciliter  oxyCODONE IR 10milliGRAM(s) Oral every 3 hours PRN Moderate Pain  oxyCODONE IR 5milliGRAM(s) Oral every 3 hours PRN Mild Pain  ondansetron Injectable 4milliGRAM(s) IV Push every 6 hours PRN Nausea and/or Vomiting  HYDROmorphone  Injectable 0.5milliGRAM(s) IV Push every 4 hours PRN severe pain/breakthrough      Allergies    No Known Allergies    Intolerances        Vital Signs Last 24 Hrs  T(C): 36.8, Max: 37.6 (03-31 @ 12:15)  T(F): 98.2, Max: 99.7 (03-31 @ 12:15)  HR: 92 (76 - 95)  BP: 164/77 (120/68 - 178/68)  BP(mean): --  RR: 21 (11 - 22)  SpO2: 92% (90% - 97%)    ACCUCHECKS    PHYSICAL EXAM-  GENERAL: currently taken morhine for pain and feels better, well-groomed, well-developed  HEAD:  Atraumatic, Normocephalic  EYES: EOMI, PERRLA, conjunctiva and sclera clear  ENMT: No tonsillar erythema, exudates, or enlargement; Moist mucous membranes,   NECK: Supple, No JVD, Normal thyroid  NERVOUS SYSTEM:  Alert & Oriented X 3, Motor Strength 4/5 B/L upper and lower extremities;   CHEST/LUNG: Clear to percussion bilaterally; No rales, rhonchi, wheezing, or rubs  HEART: Regular rate and rhythm; No murmurs, rubs, or gallops  ABDOMEN: Soft, Nontender, Nondistended; Bowel sounds present  EXTREMITIES:  2+ Peripheral Pulses, No clubbing, cyanosis; Left knee s/p surgery with hemovac drain  LYMPH: No lymphadenopathy noted  SKIN: No rashes or lesions      LABS:                        10.7   8.8   )-----------( 171      ( 31 Mar 2017 09:00 )             31.7     31 Mar 2017 09:00    135    |  101    |  18.0   ----------------------------<  100    4.5     |  23.0   |  0.91     Ca    8.8        31 Mar 2017 09:00      PT/INR - ( 31 Mar 2017 09:00 )   PT: 16.5 sec;   INR: 1.49 ratio         PTT - ( 31 Mar 2017 09:00 )  PTT:36.4 sec    RADIOLOGY & ADDITIONAL TESTS:    Assessment and Plan  DVT Prophylaxis lovenox per ortho     Discussed with: Patient, family, RN, CM, Consultants  Plan of care/ Discharge planning discussed.    Visit Time:

## 2017-03-31 NOTE — CHART NOTE - NSCHARTNOTEFT_GEN_A_CORE
pt found to be tremulous of extremities.   States he does drink daily, none since admission.  CIWA initiated

## 2017-03-31 NOTE — BRIEF OPERATIVE NOTE - COMMENTS
post-op plan: NWB while drain in, continue drain until <100/day or 60/shift, follow up cultures, continue abx per ID/medicine teams, f/u ortho 4/14 for suture removal

## 2017-04-01 ENCOUNTER — TRANSCRIPTION ENCOUNTER (OUTPATIENT)
Age: 82
End: 2017-04-01

## 2017-04-01 DIAGNOSIS — Z78.9 OTHER SPECIFIED HEALTH STATUS: ICD-10-CM

## 2017-04-01 DIAGNOSIS — I10 ESSENTIAL (PRIMARY) HYPERTENSION: ICD-10-CM

## 2017-04-01 LAB
-  AMPICILLIN/SULBACTAM: SIGNIFICANT CHANGE UP
-  CEFAZOLIN: SIGNIFICANT CHANGE UP
-  CIPROFLOXACIN: SIGNIFICANT CHANGE UP
-  CLINDAMYCIN: SIGNIFICANT CHANGE UP
-  ERYTHROMYCIN: SIGNIFICANT CHANGE UP
-  GENTAMICIN: SIGNIFICANT CHANGE UP
-  LEVOFLOXACIN: SIGNIFICANT CHANGE UP
-  MOXIFLOXACIN(AEROBIC): SIGNIFICANT CHANGE UP
-  OXACILLIN: SIGNIFICANT CHANGE UP
-  PENICILLIN: SIGNIFICANT CHANGE UP
-  RIFAMPIN: SIGNIFICANT CHANGE UP
-  TETRACYCLINE: SIGNIFICANT CHANGE UP
-  TRIMETHOPRIM/SULFAMETHOXAZOLE: SIGNIFICANT CHANGE UP
-  VANCOMYCIN: SIGNIFICANT CHANGE UP
ALBUMIN SERPL ELPH-MCNC: 2 G/DL — LOW (ref 3.3–5.2)
ALP SERPL-CCNC: 83 U/L — SIGNIFICANT CHANGE UP (ref 40–120)
ALT FLD-CCNC: 17 U/L — SIGNIFICANT CHANGE UP
ANION GAP SERPL CALC-SCNC: 9 MMOL/L — SIGNIFICANT CHANGE UP (ref 5–17)
AST SERPL-CCNC: 56 U/L — HIGH
BILIRUB DIRECT SERPL-MCNC: 0.3 MG/DL — SIGNIFICANT CHANGE UP (ref 0–0.3)
BILIRUB INDIRECT FLD-MCNC: 0.4 MG/DL — SIGNIFICANT CHANGE UP (ref 0.2–1)
BILIRUB SERPL-MCNC: 0.7 MG/DL — SIGNIFICANT CHANGE UP (ref 0.4–2)
BUN SERPL-MCNC: 24 MG/DL — HIGH (ref 8–20)
CALCIUM SERPL-MCNC: 8.6 MG/DL — SIGNIFICANT CHANGE UP (ref 8.6–10.2)
CHLORIDE SERPL-SCNC: 98 MMOL/L — SIGNIFICANT CHANGE UP (ref 98–107)
CO2 SERPL-SCNC: 25 MMOL/L — SIGNIFICANT CHANGE UP (ref 22–29)
CREAT SERPL-MCNC: 0.86 MG/DL — SIGNIFICANT CHANGE UP (ref 0.5–1.3)
CULTURE RESULTS: SIGNIFICANT CHANGE UP
GLUCOSE SERPL-MCNC: 192 MG/DL — HIGH (ref 70–115)
HCT VFR BLD CALC: 32.2 % — LOW (ref 42–52)
HGB BLD-MCNC: 11 G/DL — LOW (ref 14–18)
LYMPHOCYTES # BLD AUTO: 0.8 K/UL — LOW (ref 1–4.8)
LYMPHOCYTES # BLD AUTO: 10.2 % — LOW (ref 20–55)
MAGNESIUM SERPL-MCNC: 1.6 MG/DL — LOW (ref 1.8–2.5)
MCHC RBC-ENTMCNC: 33.5 PG — HIGH (ref 27–31)
MCHC RBC-ENTMCNC: 34.2 G/DL — SIGNIFICANT CHANGE UP (ref 32–36)
MCV RBC AUTO: 98.2 FL — HIGH (ref 80–94)
METHOD TYPE: SIGNIFICANT CHANGE UP
MONOCYTES # BLD AUTO: 0.7 K/UL — SIGNIFICANT CHANGE UP (ref 0–0.8)
MONOCYTES NFR BLD AUTO: 9.3 % — SIGNIFICANT CHANGE UP (ref 3–10)
NEUTROPHILS # BLD AUTO: 6.1 K/UL — SIGNIFICANT CHANGE UP (ref 1.8–8)
NEUTROPHILS NFR BLD AUTO: 80.2 % — HIGH (ref 37–73)
NIGHT BLUE STAIN TISS: SIGNIFICANT CHANGE UP
ORGANISM # SPEC MICROSCOPIC CNT: SIGNIFICANT CHANGE UP
ORGANISM # SPEC MICROSCOPIC CNT: SIGNIFICANT CHANGE UP
PHOSPHATE SERPL-MCNC: 2.9 MG/DL — SIGNIFICANT CHANGE UP (ref 2.4–4.7)
PLATELET # BLD AUTO: 172 K/UL — SIGNIFICANT CHANGE UP (ref 150–400)
POTASSIUM SERPL-MCNC: 4.5 MMOL/L — SIGNIFICANT CHANGE UP (ref 3.5–5.3)
POTASSIUM SERPL-SCNC: 4.5 MMOL/L — SIGNIFICANT CHANGE UP (ref 3.5–5.3)
PROT SERPL-MCNC: 7.7 G/DL — SIGNIFICANT CHANGE UP (ref 6.6–8.7)
RBC # BLD: 3.28 M/UL — LOW (ref 4.6–6.2)
RBC # FLD: 13.5 % — SIGNIFICANT CHANGE UP (ref 11–15.6)
SODIUM SERPL-SCNC: 132 MMOL/L — LOW (ref 135–145)
SPECIMEN SOURCE: SIGNIFICANT CHANGE UP
SPECIMEN SOURCE: SIGNIFICANT CHANGE UP
VANCOMYCIN TROUGH SERPL-MCNC: 5.3 UG/ML — LOW (ref 10–20)
WBC # BLD: 7.6 K/UL — SIGNIFICANT CHANGE UP (ref 4.8–10.8)
WBC # FLD AUTO: 7.6 K/UL — SIGNIFICANT CHANGE UP (ref 4.8–10.8)

## 2017-04-01 PROCEDURE — 99232 SBSQ HOSP IP/OBS MODERATE 35: CPT

## 2017-04-01 PROCEDURE — 93010 ELECTROCARDIOGRAM REPORT: CPT

## 2017-04-01 PROCEDURE — 99233 SBSQ HOSP IP/OBS HIGH 50: CPT

## 2017-04-01 RX ORDER — LISINOPRIL 2.5 MG/1
40 TABLET ORAL DAILY
Qty: 0 | Refills: 0 | Status: DISCONTINUED | OUTPATIENT
Start: 2017-04-02 | End: 2017-04-11

## 2017-04-01 RX ORDER — MAGNESIUM SULFATE 500 MG/ML
2 VIAL (ML) INJECTION ONCE
Qty: 0 | Refills: 0 | Status: COMPLETED | OUTPATIENT
Start: 2017-04-01 | End: 2017-04-01

## 2017-04-01 RX ORDER — THIAMINE MONONITRATE (VIT B1) 100 MG
100 TABLET ORAL DAILY
Qty: 0 | Refills: 0 | Status: COMPLETED | OUTPATIENT
Start: 2017-04-01 | End: 2017-04-03

## 2017-04-01 RX ORDER — FOLIC ACID 0.8 MG
1 TABLET ORAL DAILY
Qty: 0 | Refills: 0 | Status: DISCONTINUED | OUTPATIENT
Start: 2017-04-01 | End: 2017-04-11

## 2017-04-01 RX ORDER — LISINOPRIL 2.5 MG/1
20 TABLET ORAL ONCE
Qty: 0 | Refills: 0 | Status: COMPLETED | OUTPATIENT
Start: 2017-04-01 | End: 2017-04-01

## 2017-04-01 RX ADMIN — OXYCODONE HYDROCHLORIDE 5 MILLIGRAM(S): 5 TABLET ORAL at 00:36

## 2017-04-01 RX ADMIN — Medication 2: at 08:03

## 2017-04-01 RX ADMIN — Medication 2: at 11:41

## 2017-04-01 RX ADMIN — OXYCODONE HYDROCHLORIDE 10 MILLIGRAM(S): 5 TABLET ORAL at 15:39

## 2017-04-01 RX ADMIN — Medication 2: at 17:19

## 2017-04-01 RX ADMIN — OXYCODONE HYDROCHLORIDE 10 MILLIGRAM(S): 5 TABLET ORAL at 16:20

## 2017-04-01 RX ADMIN — HYDROMORPHONE HYDROCHLORIDE 0.5 MILLIGRAM(S): 2 INJECTION INTRAMUSCULAR; INTRAVENOUS; SUBCUTANEOUS at 18:44

## 2017-04-01 RX ADMIN — OXYCODONE HYDROCHLORIDE 10 MILLIGRAM(S): 5 TABLET ORAL at 20:45

## 2017-04-01 RX ADMIN — OXYCODONE HYDROCHLORIDE 5 MILLIGRAM(S): 5 TABLET ORAL at 01:15

## 2017-04-01 RX ADMIN — OXYCODONE HYDROCHLORIDE 10 MILLIGRAM(S): 5 TABLET ORAL at 19:57

## 2017-04-01 RX ADMIN — LISINOPRIL 20 MILLIGRAM(S): 2.5 TABLET ORAL at 10:35

## 2017-04-01 RX ADMIN — Medication 100 MILLIGRAM(S): at 20:00

## 2017-04-01 RX ADMIN — OXYCODONE HYDROCHLORIDE 10 MILLIGRAM(S): 5 TABLET ORAL at 23:19

## 2017-04-01 RX ADMIN — OXYCODONE HYDROCHLORIDE 10 MILLIGRAM(S): 5 TABLET ORAL at 11:53

## 2017-04-01 RX ADMIN — Medication 50 GRAM(S): at 15:43

## 2017-04-01 RX ADMIN — LISINOPRIL 20 MILLIGRAM(S): 2.5 TABLET ORAL at 06:06

## 2017-04-01 RX ADMIN — Medication 100 MILLIGRAM(S): at 06:01

## 2017-04-01 RX ADMIN — ENOXAPARIN SODIUM 30 MILLIGRAM(S): 100 INJECTION SUBCUTANEOUS at 17:19

## 2017-04-01 RX ADMIN — ENOXAPARIN SODIUM 30 MILLIGRAM(S): 100 INJECTION SUBCUTANEOUS at 06:06

## 2017-04-01 RX ADMIN — OXYCODONE HYDROCHLORIDE 10 MILLIGRAM(S): 5 TABLET ORAL at 12:30

## 2017-04-01 RX ADMIN — Medication 1 MILLIGRAM(S): at 11:40

## 2017-04-01 RX ADMIN — SODIUM CHLORIDE 100 MILLILITER(S): 9 INJECTION, SOLUTION INTRAVENOUS at 20:00

## 2017-04-01 RX ADMIN — Medication 100 MILLIGRAM(S): at 11:40

## 2017-04-01 RX ADMIN — Medication 1 TABLET(S): at 11:40

## 2017-04-01 RX ADMIN — Medication 100 MILLIGRAM(S): at 11:41

## 2017-04-01 NOTE — PROGRESS NOTE ADULT - PROBLEM SELECTOR PLAN 2
Blood culture 3/29 - MSSA  Repeat blood culture 3/31 - pending  TTE - no evidence of vegetation  On ancef and vancomycin per ID

## 2017-04-01 NOTE — PROGRESS NOTE ADULT - PROBLEM SELECTOR PLAN 2
Blood culture from Outside lab done from Dr Doe's office with Staph Aureus likely MSSA given synovial fluid culture  Blood cultures from admission also positive MSSA  Will Add vancomycin until cultures finalize  Will repeat blood cultures till they are negative  TTE with no evidence of Vegetation

## 2017-04-01 NOTE — PROGRESS NOTE ADULT - SUBJECTIVE AND OBJECTIVE BOX
Patient is awake and Responsive to all stimuli  Vital signs are Stable  Having conversation with Medical Personel @ time of visit  No Anesthesia Complications noted

## 2017-04-01 NOTE — PROGRESS NOTE ADULT - SUBJECTIVE AND OBJECTIVE BOX
MAGED READ    [id number]    81y      Male    INTERVAL HPI/OVERNIGHT EVENTS: Noted to be tremulous overnight. CIWA protocol initiated - 5. Pt reports last drink was a week ago.     Hospital course:  80 yo M with h/o DM2, HTN, alcoholic cirrhosis presents from PMD office with +MSSA left knee arthrocentesis. Doppler study was negative for DVT.     REVIEW OF SYSTEMS:    CONSTITUTIONAL: No fever  RESPIRATORY: No cough; No shortness of breath  CARDIOVASCULAR: No chest pain, palpitations  GASTROINTESTINAL: No abdominal pain. No nausea, vomiting  NEUROLOGICAL: No headaches    Vital Signs Last 24 Hrs  T(C): 37.2, Max: 37.6 (03-31 @ 12:15)  T(F): 99, Max: 99.7 (03-31 @ 12:15)  HR: 70 (70 - 92)  BP: 137/64 (133/61 - 178/68)  BP(mean): --  RR: 18 (11 - 22)  SpO2: 100% (90% - 100%)    PHYSICAL EXAM:    GENERAL: NAD, well-groomed  HEENT: PERRL, +EOMI  NECK: soft, Supple, No JVD,   CHEST/LUNG: Clear to percussion bilaterally; No wheezing  HEART: S1S2+, Regular rate and rhythm; No murmurs, rubs, or gallops  ABDOMEN: Soft, Nontender, Nondistended; Bowel sounds present  EXTREMITIES:  2+ Peripheral Pulses, No clubbing, cyanosis, or edema  SKIN: No rashes or lesions  NEURO: AAOX3, no focal deficits, no motor r sensory loss  PSYCH: normal mood      LABS:                        11.0   7.6   )-----------( 172      ( 01 Apr 2017 08:35 )             32.2     31 Mar 2017 09:00    135    |  101    |  18.0   ----------------------------<  100    4.5     |  23.0   |  0.91     Ca    8.8        31 Mar 2017 09:00      PT/INR - ( 31 Mar 2017 09:00 )   PT: 16.5 sec;   INR: 1.49 ratio         PTT - ( 31 Mar 2017 09:00 )  PTT:36.4 sec        MEDICATIONS  (STANDING):  ceFAZolin   IVPB 2000milliGRAM(s) IV Intermittent <User Schedule>  enoxaparin Injectable 30milliGRAM(s) SubCutaneous every 12 hours  lisinopril 20milliGRAM(s) Oral daily  insulin lispro (HumaLOG) corrective regimen sliding scale  SubCutaneous three times a day before meals  dextrose 50% Injectable 12.5Gram(s) IV Push once  dextrose 50% Injectable 25Gram(s) IV Push once  dextrose 50% Injectable 25Gram(s) IV Push once  lactated ringers. 1000milliLiter(s) IV Continuous <Continuous>    MEDICATIONS  (PRN):  dextrose Gel 1Dose(s) Oral once PRN Blood Glucose LESS THAN 70 milliGRAM(s)/deciliter  glucagon  Injectable 1milliGRAM(s) IntraMuscular once PRN Glucose LESS THAN 70 milligrams/deciliter  oxyCODONE IR 10milliGRAM(s) Oral every 3 hours PRN Moderate Pain  oxyCODONE IR 5milliGRAM(s) Oral every 3 hours PRN Mild Pain  ondansetron Injectable 4milliGRAM(s) IV Push every 6 hours PRN Nausea and/or Vomiting  HYDROmorphone  Injectable 0.5milliGRAM(s) IV Push every 4 hours PRN severe pain/breakthrough      RADIOLOGY & ADDITIONAL TESTS: MAGED READ    [id number]    81y      Male    INTERVAL HPI/OVERNIGHT EVENTS: Noted to be tremulous overnight. CIWA protocol initiated which ranged from 5-6. Pt reports last drink was a week ago. Denies ever having been in alcohol withdrawal or having delirium tremens.      Hospital course:  82 yo M with h/o DM2, HTN, alcoholic cirrhosis presents from PMD office with +MSSA left knee arthrocentesis. Blood culture 3/29 was positive for MSSA. Doppler study was negative for DVT. 3/31 had I&D of left knee.     REVIEW OF SYSTEMS:    CONSTITUTIONAL: No fever  RESPIRATORY: No cough; No shortness of breath  CARDIOVASCULAR: No chest pain, palpitations  GASTROINTESTINAL: No abdominal pain. No nausea, vomiting  NEUROLOGICAL: No headaches    Vital Signs Last 24 Hrs  T(C): 37.2, Max: 37.6 (03-31 @ 12:15)  T(F): 99, Max: 99.7 (03-31 @ 12:15)  HR: 70 (70 - 92)  BP: 137/64 (133/61 - 178/68)  BP(mean): --  RR: 18 (11 - 22)  SpO2: 100% (90% - 100%)    PHYSICAL EXAM:    GENERAL: NAD   HEENT: PERRL, +EOMI, MMM  NECK: soft, Supple, No JVD,   CHEST/LUNG: Clear to percussion bilaterally; No wheezing  HEART: S1S2+, Regular rate and rhythm; No murmurs   ABDOMEN: Soft, Nontender, Nondistended; Bowel sounds present  EXTREMITIES: Left leg wrapped in ACE - erythematous, +2 pitting edema  SKIN: warm and dry  NEURO: AAOX3, no focal deficits   PSYCH: normal mood      LABS:                        11.0   7.6   )-----------( 172      ( 01 Apr 2017 08:35 )             32.2     31 Mar 2017 09:00    135    |  101    |  18.0   ----------------------------<  100    4.5     |  23.0   |  0.91     Ca    8.8        31 Mar 2017 09:00      PT/INR - ( 31 Mar 2017 09:00 )   PT: 16.5 sec;   INR: 1.49 ratio         PTT - ( 31 Mar 2017 09:00 )  PTT:36.4 sec        MEDICATIONS  (STANDING):  ceFAZolin   IVPB 2000milliGRAM(s) IV Intermittent <User Schedule>  enoxaparin Injectable 30milliGRAM(s) SubCutaneous every 12 hours  lisinopril 20milliGRAM(s) Oral daily  insulin lispro (HumaLOG) corrective regimen sliding scale  SubCutaneous three times a day before meals  dextrose 50% Injectable 12.5Gram(s) IV Push once  dextrose 50% Injectable 25Gram(s) IV Push once  dextrose 50% Injectable 25Gram(s) IV Push once  lactated ringers. 1000milliLiter(s) IV Continuous <Continuous>    MEDICATIONS  (PRN):  dextrose Gel 1Dose(s) Oral once PRN Blood Glucose LESS THAN 70 milliGRAM(s)/deciliter  glucagon  Injectable 1milliGRAM(s) IntraMuscular once PRN Glucose LESS THAN 70 milligrams/deciliter  oxyCODONE IR 10milliGRAM(s) Oral every 3 hours PRN Moderate Pain  oxyCODONE IR 5milliGRAM(s) Oral every 3 hours PRN Mild Pain  ondansetron Injectable 4milliGRAM(s) IV Push every 6 hours PRN Nausea and/or Vomiting  HYDROmorphone  Injectable 0.5milliGRAM(s) IV Push every 4 hours PRN severe pain/breakthrough      RADIOLOGY & ADDITIONAL TESTS:

## 2017-04-01 NOTE — PROGRESS NOTE ADULT - SUBJECTIVE AND OBJECTIVE BOX
s/p left knee i and D pod #1.  Pt doing well no c/o.  Pt with +staph from knee aspirate 3/30/2017.  OR Cx pending                          11.0   7.6   )-----------( 172      ( 01 Apr 2017 08:35 )             32.2     31 Mar 2017 09:00    135    |  101    |  18.0   ----------------------------<  100    4.5     |  23.0   |  0.91     Ca    8.8        31 Mar 2017 09:00      Vital Signs Last 24 Hrs  T(C): 37.2, Max: 37.6 (03-31 @ 12:15)  T(F): 99, Max: 99.7 (03-31 @ 12:15)  HR: 70 (70 - 92)  BP: 137/64 (133/61 - 178/68)  BP(mean): --  RR: 18 (11 - 22)  SpO2: 100% (90% - 100%)      Pt lying in bed NAD, easily arousable  Left knee dressing in place c/d/i  Hemovac drain intact with approx 20cc in canister  (drain output 50cc overnight)  calf soft NT  toe movement intact, sensation intact, pulses palp    A/P:  left septic knee   IV ABx per ID  WBAT LLE  pain control  monitor drain output  will follow up OR cultures

## 2017-04-01 NOTE — PROGRESS NOTE ADULT - SUBJECTIVE AND OBJECTIVE BOX
[facility]  INFECTIOUS DISEASES AND INTERNAL MEDICINE OF [place]  =======================================================  Ketan Ramesh MD  Diplomates American Board of Internal Medicine and Infectious Diseases  =======================================================    MAGED NOBLE     PT S/P OR WASHOUT DRAIN IN PLACE  No fevers    Allergies:  No Known Allergies      Antibiotics:  ceFAZolin   IVPB 2000milliGRAM(s) IV Intermittent every 8 hours         REVIEW OF SYSTEMS:  CONSTITUTIONAL:  No chills  HEENT:   No diplopia or blurred vision. No earache, sore throat or runny nose.  CARDIOVASCULAR:  No pressure, squeezing, strangling, tightness, heaviness or aching about the chest, neck, axilla or epigastrium.  RESPIRATORY:  No cough, shortness of breat  GASTROINTESTINAL:  No nausea, vomiting or diarrhea.  GENITOURINARY:  No dysuria, frequency or urgency.   MUSCULOSKELETAL:  Left knee pain  SKIN:  No change in skin, hair or nails.  NEUROLOGIC:  No paresthesias, fasciculations  PSYCHIATRIC:  No disorder of thought or mood.  ENDOCRINE:  No heat or cold intolerance, polyuria or polydipsia.  HEMATOLOGICAL:  No easy bruising or bleeding.      Physical Exam:  Vital Signs Last 24 Hrs  T(C): 37.2, Max: 37.6 (03-31 @ 12:15)  T(F): 99, Max: 99.7 (03-31 @ 12:15)  HR: 70 (70 - 92)  BP: 137/64 (133/61 - 178/68)  BP(mean): --  RR: 18 (11 - 22)  SpO2: 100% (90% - 100%)    GEN: NAD, pleasant  HEENT: normocephalic and atraumatic. EOMI. PERRL.    NECK: Supple.   LUNGS: Clear to auscultation.  HEART: Regular rate and rhythm  ABDOMEN: Soft, nontender, and nondistended.  Positive bowel sounds.    : no CVA tenderness  EXTREMITIES: LLE knee swelling and leg swelling  MSK: Left knee OR DRESSING DRAIN IN PLACE  NEUROLOGIC: AAOx3  PSYCHIATRIC: Appropriate affect .  SKIN: Left knee redness and warmth        Labs:  30 Mar 2017 06:33    134    |  102    |  20.0   ----------------------------<  133    4.7     |  23.0   |  0.94     Ca    8.9        30 Mar 2017 06:33    TPro  9.4    /  Alb  2.6    /  TBili  1.7    /  DBili  x      /  AST  64     /  ALT  42     /  AlkPhos  114    29 Mar 2017 14:32                          11.8   9.4   )-----------( 192      ( 30 Mar 2017 06:36 )             34.4       PT/INR - ( 29 Mar 2017 14:32 )   PT: 15.3 sec;   INR: 1.38 ratio         PTT - ( 29 Mar 2017 14:32 )  PTT:29.9 sec    LIVER FUNCTIONS - ( 29 Mar 2017 14:32 )  Alb: 2.6 g/dL / Pro: 9.4 g/dL / ALK PHOS: 114 U/L / ALT: 42 U/L / AST: 64 U/L / GGT: x             RECENT CULTURES:  03-30 .Body Fluid Synovial Fluid XXXX   Numerous white blood cells  No organisms seen XXXX    03-29 .Blood Blood XXXX XXXX   Growth in aerobic bottle: Gram Positive Cocci in Clusters  Aerobic Bottle: 14.18 Hours to positivity  Anaerobic Bottle: Gram Positive Cocci in Clusters  Anaerobic Bottle: 1 day 12:02 Hours to positivity  .  TYPE: (C=Critical, N=Notification, A=Abnor    03-29 .Blood Blood Staphylococcus aureus XXXX   Growth in aerobic and anaerobic bottles: Gram Positive Cocci in Clusters  Aerobic Bottle: 14.38 Hours to positivity  Anaerobic Bottle: 1 day 2 Hours to positivity  .  TYPE: (C=Critical, N=Notification, A=Abnormal) C  TESTS:  _ GS  DATE/TIME CALLED:    Cultures from Dr Doe's office:  03/28 Blood cultures  No growth x 3 days    03/28 Blood cultures  Staphylococcus Aureus     3/24 Left knee aspiration cx  MSSA      TTE   Summary:   1. Technically difficult study.   2. Endocardial visualization was enhanced with intravenous echo contrast.   3. Normal biventricular systolic function. Left ventricular ejection   fraction, by visual estimation, is 60 to 65%.   4. Spectral Doppler shows impaired relaxation pattern of left   ventricular myocardial filling (Grade I diastolic dysfunction).   5. No significant valvular disease.   6. No pericardial effusion.   7. ** No prior echocardiograms available for comparison.

## 2017-04-02 LAB
-  AMPICILLIN/SULBACTAM: SIGNIFICANT CHANGE UP
-  CEFAZOLIN: SIGNIFICANT CHANGE UP
-  CIPROFLOXACIN: SIGNIFICANT CHANGE UP
-  CLINDAMYCIN: SIGNIFICANT CHANGE UP
-  ERYTHROMYCIN: SIGNIFICANT CHANGE UP
-  GENTAMICIN: SIGNIFICANT CHANGE UP
-  LEVOFLOXACIN: SIGNIFICANT CHANGE UP
-  MOXIFLOXACIN(AEROBIC): SIGNIFICANT CHANGE UP
-  OXACILLIN: SIGNIFICANT CHANGE UP
-  PENICILLIN: SIGNIFICANT CHANGE UP
-  RIFAMPIN: SIGNIFICANT CHANGE UP
-  TETRACYCLINE: SIGNIFICANT CHANGE UP
-  TRIMETHOPRIM/SULFAMETHOXAZOLE: SIGNIFICANT CHANGE UP
-  VANCOMYCIN: SIGNIFICANT CHANGE UP
ANION GAP SERPL CALC-SCNC: 8 MMOL/L — SIGNIFICANT CHANGE UP (ref 5–17)
BUN SERPL-MCNC: 28 MG/DL — HIGH (ref 8–20)
CALCIUM SERPL-MCNC: 8.7 MG/DL — SIGNIFICANT CHANGE UP (ref 8.6–10.2)
CHLORIDE SERPL-SCNC: 100 MMOL/L — SIGNIFICANT CHANGE UP (ref 98–107)
CO2 SERPL-SCNC: 27 MMOL/L — SIGNIFICANT CHANGE UP (ref 22–29)
CREAT SERPL-MCNC: 0.84 MG/DL — SIGNIFICANT CHANGE UP (ref 0.5–1.3)
GLUCOSE SERPL-MCNC: 146 MG/DL — HIGH (ref 70–115)
METHOD TYPE: SIGNIFICANT CHANGE UP
POTASSIUM SERPL-MCNC: 4.8 MMOL/L — SIGNIFICANT CHANGE UP (ref 3.5–5.3)
POTASSIUM SERPL-SCNC: 4.8 MMOL/L — SIGNIFICANT CHANGE UP (ref 3.5–5.3)
SODIUM SERPL-SCNC: 135 MMOL/L — SIGNIFICANT CHANGE UP (ref 135–145)

## 2017-04-02 PROCEDURE — 99233 SBSQ HOSP IP/OBS HIGH 50: CPT

## 2017-04-02 PROCEDURE — 99232 SBSQ HOSP IP/OBS MODERATE 35: CPT

## 2017-04-02 RX ADMIN — ENOXAPARIN SODIUM 30 MILLIGRAM(S): 100 INJECTION SUBCUTANEOUS at 16:58

## 2017-04-02 RX ADMIN — Medication 1 TABLET(S): at 11:52

## 2017-04-02 RX ADMIN — Medication 1 MILLIGRAM(S): at 11:52

## 2017-04-02 RX ADMIN — SODIUM CHLORIDE 100 MILLILITER(S): 9 INJECTION, SOLUTION INTRAVENOUS at 06:35

## 2017-04-02 RX ADMIN — Medication 100 MILLIGRAM(S): at 11:52

## 2017-04-02 RX ADMIN — OXYCODONE HYDROCHLORIDE 10 MILLIGRAM(S): 5 TABLET ORAL at 21:29

## 2017-04-02 RX ADMIN — Medication 100 MILLIGRAM(S): at 19:56

## 2017-04-02 RX ADMIN — OXYCODONE HYDROCHLORIDE 10 MILLIGRAM(S): 5 TABLET ORAL at 15:00

## 2017-04-02 RX ADMIN — OXYCODONE HYDROCHLORIDE 10 MILLIGRAM(S): 5 TABLET ORAL at 18:27

## 2017-04-02 RX ADMIN — OXYCODONE HYDROCHLORIDE 10 MILLIGRAM(S): 5 TABLET ORAL at 00:00

## 2017-04-02 RX ADMIN — OXYCODONE HYDROCHLORIDE 10 MILLIGRAM(S): 5 TABLET ORAL at 22:15

## 2017-04-02 RX ADMIN — OXYCODONE HYDROCHLORIDE 10 MILLIGRAM(S): 5 TABLET ORAL at 14:20

## 2017-04-02 RX ADMIN — SODIUM CHLORIDE 100 MILLILITER(S): 9 INJECTION, SOLUTION INTRAVENOUS at 19:57

## 2017-04-02 RX ADMIN — OXYCODONE HYDROCHLORIDE 10 MILLIGRAM(S): 5 TABLET ORAL at 19:09

## 2017-04-02 RX ADMIN — ENOXAPARIN SODIUM 30 MILLIGRAM(S): 100 INJECTION SUBCUTANEOUS at 05:22

## 2017-04-02 RX ADMIN — LISINOPRIL 40 MILLIGRAM(S): 2.5 TABLET ORAL at 05:22

## 2017-04-02 RX ADMIN — Medication 100 MILLIGRAM(S): at 05:18

## 2017-04-02 NOTE — PROGRESS NOTE ADULT - SUBJECTIVE AND OBJECTIVE BOX
[facility]  INFECTIOUS DISEASES AND INTERNAL MEDICINE OF [place]  =======================================================  Ketan Ramesh MD  Diplomates American Board of Internal Medicine and Infectious Diseases  =======================================================    MAGED NOBLE     PT S/P OR WASHOUT DRAIN IN PLACE  No fevers    Allergies:  No Known Allergies      Antibiotics:  ceFAZolin   IVPB 2000milliGRAM(s) IV Intermittent every 8 hours         REVIEW OF SYSTEMS:  CONSTITUTIONAL:  No chills  HEENT:   No diplopia or blurred vision. No earache, sore throat or runny nose.  CARDIOVASCULAR:  No pressure, squeezing, strangling, tightness, heaviness or aching about the chest, neck, axilla or epigastrium.  RESPIRATORY:  No cough, shortness of breat  GASTROINTESTINAL:  No nausea, vomiting or diarrhea.  GENITOURINARY:  No dysuria, frequency or urgency.   MUSCULOSKELETAL:  Left knee pain  SKIN:  No change in skin, hair or nails.  NEUROLOGIC:  No paresthesias, fasciculations  PSYCHIATRIC:  No disorder of thought or mood.  ENDOCRINE:  No heat or cold intolerance, polyuria or polydipsia.  HEMATOLOGICAL:  No easy bruising or bleeding.      Physical Exam:  Vital Signs Last 24 Hrs  T(C): 37.2, Max: 37.6 (03-31 @ 12:15)  T(F): 99, Max: 99.7 (03-31 @ 12:15)  HR: 70 (70 - 92)  BP: 137/64 (133/61 - 178/68)  BP(mean): --  RR: 18 (11 - 22)  SpO2: 100% (90% - 100%)    GEN: NAD, pleasant  HEENT: normocephalic and atraumatic. EOMI. PERRL.    NECK: Supple.   LUNGS: Clear to auscultation.  HEART: Regular rate and rhythm  ABDOMEN: Soft, nontender, and nondistended.  Positive bowel sounds.    : no CVA tenderness  EXTREMITIES: LLE knee swelling and leg swelling  MSK: Left knee OR DRESSING DRAIN IN PLACE  NEUROLOGIC: AAOx3  PSYCHIATRIC: Appropriate affect .  SKIN: Left knee redness and warmth        Labs:  30 Mar 2017 06:33    134    |  102    |  20.0   ----------------------------<  133    4.7     |  23.0   |  0.94     Ca    8.9        30 Mar 2017 06:33    TPro  9.4    /  Alb  2.6    /  TBili  1.7    /  DBili  x      /  AST  64     /  ALT  42     /  AlkPhos  114    29 Mar 2017 14:32                          11.8   9.4   )-----------( 192      ( 30 Mar 2017 06:36 )             34.4       PT/INR - ( 29 Mar 2017 14:32 )   PT: 15.3 sec;   INR: 1.38 ratio         PTT - ( 29 Mar 2017 14:32 )  PTT:29.9 sec    LIVER FUNCTIONS - ( 29 Mar 2017 14:32 )  Alb: 2.6 g/dL / Pro: 9.4 g/dL / ALK PHOS: 114 U/L / ALT: 42 U/L / AST: 64 U/L / GGT: x             RECENT CULTURES:  03-30 .Body Fluid Synovial Fluid XXXX   Numerous white blood cells  No organisms seen XXXX    03-29 .Blood Blood XXXX XXXX   Growth in aerobic bottle: Gram Positive Cocci in Clusters  Aerobic Bottle: 14.18 Hours to positivity  Anaerobic Bottle: Gram Positive Cocci in Clusters  Anaerobic Bottle: 1 day 12:02 Hours to positivity  .  TYPE: (C=Critical, N=Notification, A=Abnor    03-29 .Blood Blood Staphylococcus aureus XXXX   Growth in aerobic and anaerobic bottles: Gram Positive Cocci in Clusters  Aerobic Bottle: 14.38 Hours to positivity  Anaerobic Bottle: 1 day 2 Hours to positivity  .  TYPE: (C=Critical, N=Notification, A=Abnormal) C  TESTS:  _ GS  DATE/TIME CALLED:    Cultures from Dr Doe's office:  03/28 Blood cultures  No growth x 3 days    03/28 Blood cultures  Staphylococcus Aureus     3/24 Left knee aspiration cx  MSSA      TTE   Summary:   1. Technically difficult study.   2. Endocardial visualization was enhanced with intravenous echo contrast.   3. Normal biventricular systolic function. Left ventricular ejection   fraction, by visual estimation, is 60 to 65%.   4. Spectral Doppler shows impaired relaxation pattern of left   ventricular myocardial filling (Grade I diastolic dysfunction).   5. No significant valvular disease.   6. No pericardial effusion.   7. ** No prior echocardiograms available for comparison.

## 2017-04-02 NOTE — PROGRESS NOTE ADULT - SUBJECTIVE AND OBJECTIVE BOX
MAGED READ    [id number]    81y      Male    INTERVAL HPI/OVERNIGHT EVENTS: No events on.     REVIEW OF SYSTEMS:    CONSTITUTIONAL: No fever, weight loss, or fatigue  RESPIRATORY: No cough, wheezing, hemoptysis; No shortness of breath  CARDIOVASCULAR: No chest pain, palpitations  GASTROINTESTINAL: No abdominal or epigastric pain. No nausea, vomiting  NEUROLOGICAL: No headaches, memory loss, loss of strength.  MISCELLANEOUS:      Vital Signs Last 24 Hrs  T(C): 36.5, Max: 36.6 (04-01 @ 16:51)  T(F): 97.7, Max: 97.8 (04-01 @ 16:51)  HR: 84 (78 - 84)  BP: 163/72 (129/64 - 163/72)  BP(mean): --  RR: 18 (18 - 18)  SpO2: 98% (98% - 98%)    PHYSICAL EXAM:    GENERAL: NAD, well-groomed  HEENT: PERRL, +EOMI  NECK: soft, Supple, No JVD,   CHEST/LUNG: Clear to percussion bilaterally; No wheezing  HEART: S1S2+, Regular rate and rhythm; No murmurs, rubs, or gallops  ABDOMEN: Soft, Nontender, Nondistended; Bowel sounds present  EXTREMITIES:  2+ Peripheral Pulses, No clubbing, cyanosis, or edema  SKIN: No rashes or lesions  NEURO: AAOX3, no focal deficits, no motor r sensory loss  PSYCH: normal mood      LABS:                        11.0   7.6   )-----------( 172      ( 01 Apr 2017 08:35 )             32.2     02 Apr 2017 07:38    135    |  100    |  28.0   ----------------------------<  146    4.8     |  27.0   |  0.84     Ca    8.7        02 Apr 2017 07:38  Phos  2.9       01 Apr 2017 08:35  Mg     1.6       01 Apr 2017 08:35    TPro  7.7    /  Alb  2.0    /  TBili  0.7    /  DBili  0.3    /  AST  56     /  ALT  17     /  AlkPhos  83     01 Apr 2017 08:35    PT/INR - ( 31 Mar 2017 09:00 )   PT: 16.5 sec;   INR: 1.49 ratio         PTT - ( 31 Mar 2017 09:00 )  PTT:36.4 sec        MEDICATIONS  (STANDING):  ceFAZolin   IVPB 2000milliGRAM(s) IV Intermittent <User Schedule>  enoxaparin Injectable 30milliGRAM(s) SubCutaneous every 12 hours  insulin lispro (HumaLOG) corrective regimen sliding scale  SubCutaneous three times a day before meals  dextrose 50% Injectable 12.5Gram(s) IV Push once  dextrose 50% Injectable 25Gram(s) IV Push once  dextrose 50% Injectable 25Gram(s) IV Push once  lactated ringers. 1000milliLiter(s) IV Continuous <Continuous>  folic acid 1milliGRAM(s) Oral daily  multivitamin 1Tablet(s) Oral daily  thiamine 100milliGRAM(s) Oral daily  lisinopril 40milliGRAM(s) Oral daily    MEDICATIONS  (PRN):  dextrose Gel 1Dose(s) Oral once PRN Blood Glucose LESS THAN 70 milliGRAM(s)/deciliter  glucagon  Injectable 1milliGRAM(s) IntraMuscular once PRN Glucose LESS THAN 70 milligrams/deciliter  oxyCODONE IR 10milliGRAM(s) Oral every 3 hours PRN Moderate Pain  oxyCODONE IR 5milliGRAM(s) Oral every 3 hours PRN Mild Pain  ondansetron Injectable 4milliGRAM(s) IV Push every 6 hours PRN Nausea and/or Vomiting  HYDROmorphone  Injectable 0.5milliGRAM(s) IV Push every 4 hours PRN severe pain/breakthrough  LORazepam   Injectable 2milliGRAM(s) IV Push every 1 hour PRN Symptom-triggered: each CIWA -Ar score 8 or GREATER      RADIOLOGY & ADDITIONAL TESTS: MAGED READ    [id number]    81y      Male    INTERVAL HPI/OVERNIGHT EVENTS: No events on. Denies any complaints.     Hospital course:  82 yo M with h/o DM2, HTN, alcoholic cirrhosis presents from PMD office with +MSSA left knee arthrocentesis. Blood culture 3/29 was positive for MSSA. Doppler study was negative for DVT. 3/31 had I&D of left knee. TTE showed EF 60-65%.     REVIEW OF SYSTEMS:    CONSTITUTIONAL: No fever   RESPIRATORY: No cough; No shortness of breath  CARDIOVASCULAR: No chest pain  GASTROINTESTINAL: No abdominal pain. No nausea, vomiting  NEUROLOGICAL: No headaches    Vital Signs Last 24 Hrs  T(C): 36.5, Max: 36.6 (04-01 @ 16:51)  T(F): 97.7, Max: 97.8 (04-01 @ 16:51)  HR: 84 (78 - 84)  BP: 163/72 (129/64 - 163/72)  BP(mean): --  RR: 18 (18 - 18)  SpO2: 98% (98% - 98%)    PHYSICAL EXAM:    GENERAL: NAD  HEENT: PERRL, +EOMI, MMM  NECK: soft, Supple, No JVD,   CHEST/LUNG: Clear to percussion bilaterally; No wheezing  HEART: S1S2+, Regular rate and rhythm  ABDOMEN: Soft, Nontender, Nondistended; Bowel sounds present  EXTREMITIES:  LLE wrapped in ace bandage with MARKUS drain  SKIN: No rashes or lesions  NEURO: AAOX3, no focal deficits    LABS:                        11.0   7.6   )-----------( 172      ( 01 Apr 2017 08:35 )             32.2     02 Apr 2017 07:38    135    |  100    |  28.0   ----------------------------<  146    4.8     |  27.0   |  0.84     Ca    8.7        02 Apr 2017 07:38  Phos  2.9       01 Apr 2017 08:35  Mg     1.6       01 Apr 2017 08:35    TPro  7.7    /  Alb  2.0    /  TBili  0.7    /  DBili  0.3    /  AST  56     /  ALT  17     /  AlkPhos  83     01 Apr 2017 08:35    PT/INR - ( 31 Mar 2017 09:00 )   PT: 16.5 sec;   INR: 1.49 ratio         PTT - ( 31 Mar 2017 09:00 )  PTT:36.4 sec        MEDICATIONS  (STANDING):  ceFAZolin   IVPB 2000milliGRAM(s) IV Intermittent <User Schedule>  enoxaparin Injectable 30milliGRAM(s) SubCutaneous every 12 hours  insulin lispro (HumaLOG) corrective regimen sliding scale  SubCutaneous three times a day before meals  dextrose 50% Injectable 12.5Gram(s) IV Push once  dextrose 50% Injectable 25Gram(s) IV Push once  dextrose 50% Injectable 25Gram(s) IV Push once  lactated ringers. 1000milliLiter(s) IV Continuous <Continuous>  folic acid 1milliGRAM(s) Oral daily  multivitamin 1Tablet(s) Oral daily  thiamine 100milliGRAM(s) Oral daily  lisinopril 40milliGRAM(s) Oral daily    MEDICATIONS  (PRN):  dextrose Gel 1Dose(s) Oral once PRN Blood Glucose LESS THAN 70 milliGRAM(s)/deciliter  glucagon  Injectable 1milliGRAM(s) IntraMuscular once PRN Glucose LESS THAN 70 milligrams/deciliter  oxyCODONE IR 10milliGRAM(s) Oral every 3 hours PRN Moderate Pain  oxyCODONE IR 5milliGRAM(s) Oral every 3 hours PRN Mild Pain  ondansetron Injectable 4milliGRAM(s) IV Push every 6 hours PRN Nausea and/or Vomiting  HYDROmorphone  Injectable 0.5milliGRAM(s) IV Push every 4 hours PRN severe pain/breakthrough  LORazepam   Injectable 2milliGRAM(s) IV Push every 1 hour PRN Symptom-triggered: each CIWA -Ar score 8 or GREATER      RADIOLOGY & ADDITIONAL TESTS:

## 2017-04-02 NOTE — PHYSICAL THERAPY INITIAL EVALUATION ADULT - ADDITIONAL COMMENTS
Patient reports previously living alone, 3 DEV using crutches and RW previously for ambulation. Pt denies needs with ADLs PTA.

## 2017-04-02 NOTE — PROGRESS NOTE ADULT - PROBLEM SELECTOR PLAN 2
Blood culture from Outside lab done from Dr Doe's office with Staph Aureus likely MSSA given synovial fluid culture  Blood cultures from admission also positive MSSA

## 2017-04-02 NOTE — PHYSICAL THERAPY INITIAL EVALUATION ADULT - GENERAL OBSERVATIONS, REHAB EVAL
Pt rec'd supine in bed, anxious, reports hesitancy at OOB, however, agreeable after copious encouragement and pt call with his general practitioner Dr. Doe.

## 2017-04-02 NOTE — PROGRESS NOTE ADULT - SUBJECTIVE AND OBJECTIVE BOX
Pt Name: MAGED NOBLE    MRN: [id number]      Patient is a being followed for open I&D of a septic left knee on 3/31/2017. He reports of improved pain. No acute worsening of condition.       PAST MEDICAL & SURGICAL HISTORY:  PAST MEDICAL & SURGICAL HISTORY:  DM (diabetes mellitus)  alcohol abuse      Allergies: No Known Allergies      Medications: ceFAZolin   IVPB 2000milliGRAM(s) IV Intermittent <User Schedule>  enoxaparin Injectable 30milliGRAM(s) SubCutaneous every 12 hours  insulin lispro (HumaLOG) corrective regimen sliding scale  SubCutaneous three times a day before meals  dextrose Gel 1Dose(s) Oral once PRN  dextrose 50% Injectable 12.5Gram(s) IV Push once  dextrose 50% Injectable 25Gram(s) IV Push once  dextrose 50% Injectable 25Gram(s) IV Push once  glucagon  Injectable 1milliGRAM(s) IntraMuscular once PRN  lactated ringers. 1000milliLiter(s) IV Continuous <Continuous>  oxyCODONE IR 10milliGRAM(s) Oral every 3 hours PRN  oxyCODONE IR 5milliGRAM(s) Oral every 3 hours PRN  ondansetron Injectable 4milliGRAM(s) IV Push every 6 hours PRN  HYDROmorphone  Injectable 0.5milliGRAM(s) IV Push every 4 hours PRN  LORazepam   Injectable 2milliGRAM(s) IV Push every 1 hour PRN  folic acid 1milliGRAM(s) Oral daily  multivitamin 1Tablet(s) Oral daily  thiamine 100milliGRAM(s) Oral daily  lisinopril 40milliGRAM(s) Oral daily    OR ASPIRATION CULTURE RESULTS:    Culture - Tissue with Gram Stain (03.31.17 @ 13:05)    Gram Stain:   Few White blood cells  No organisms seen    Specimen Source: .Tissue L Knee Tissue    Culture Results:   Rare Staphylococcus aureus  Culture in progress      ED ASPIRATION CULTURE RESULTS:    Culture - Body Fluid with Gram Stain (03.30.17 @ 07:48)    -  Cefazolin: S <=4    -  Clindamycin: S <=0.5    -  RIF- Rifampin: S <=1    -  Vancomycin: S 2    -  Ciprofloxacin: S <=1    -  Levofloxacin: S <=1    -  Moxifloxacin(Aerobic): S <=0.5    -  Oxacillin: S <=0.25    -  Trimethoprim/Sulfamethoxazole: S <=0.5/9.5    -  Ampicillin/Sulbactam: S <=8/4    Gram Stain:   Numerous white blood cells  No organisms seen    -  Erythromycin: R >4    -  Gentamicin: S <=4    -  Penicillin: R >8    -  Tetra/Doxy: S <=4    Specimen Source: .Body Fluid Synovial Fluid    Culture Results:   Rare Staphylococcus aureus  Culture in progress    Organism Identification: Staphylococcus aureus    Organism: Staphylococcus aureus    Method Type: LISSA      PHYSICAL EXAM:    Vital Signs Last 24 Hrs  T(C): 36.5, Max: 36.6 (04-01 @ 16:51)  T(F): 97.7, Max: 97.8 (04-01 @ 16:51)  HR: 84 (78 - 84)  BP: 163/72 (129/64 - 163/72)  BP(mean): --  RR: 18 (18 - 18)  SpO2: 98% (98% - 98%)  Daily     Daily     DRAIN OUTPUT- 50ML IN 24hr    Appearance: Alert, responsive, in no acute distress.    Neurological: Sensation is grossly intact to light touch. 5/5 motor function of all extremities. No focal deficits or weaknesses found.    Skin: + DRAIN NOTED. wound is closed with sutures. No active discharge or bleeding. No rash on visible skin. No ulcerations. + Improved soft tissue swelling     Vascular: 2+ distal pulses. Cap refill < 2 sec. No signs of venous insufficiency or stasis. No extremity ulcerations. No cyanosis.    Musculoskeletal:       Left Lower Extremity:  No bony tenderness. No calf tenderness. No effusion noted. + improved AROM noted.      A/P:  Pt is a  81y Male s/p open I&D of a left septic knee improving.    PLAN:   * continue antibiotics  * dressing changed  * continue drain and monitor output

## 2017-04-02 NOTE — PHYSICAL THERAPY INITIAL EVALUATION ADULT - ACTIVE RANGE OF MOTION EXAMINATION, REHAB EVAL
R hip, knee and ankle WFL, L hip WFL, knee flexion 45 degrees, extension lacking 15 degrees from neutral in surgical wraps and ace. B ankle DF WFL.

## 2017-04-03 LAB
-  AMPICILLIN/SULBACTAM: SIGNIFICANT CHANGE UP
-  CEFAZOLIN: SIGNIFICANT CHANGE UP
-  CIPROFLOXACIN: SIGNIFICANT CHANGE UP
-  CLINDAMYCIN: SIGNIFICANT CHANGE UP
-  ERYTHROMYCIN: SIGNIFICANT CHANGE UP
-  GENTAMICIN: SIGNIFICANT CHANGE UP
-  LEVOFLOXACIN: SIGNIFICANT CHANGE UP
-  MOXIFLOXACIN(AEROBIC): SIGNIFICANT CHANGE UP
-  OXACILLIN: SIGNIFICANT CHANGE UP
-  PENICILLIN: SIGNIFICANT CHANGE UP
-  RIFAMPIN: SIGNIFICANT CHANGE UP
-  TETRACYCLINE: SIGNIFICANT CHANGE UP
-  TRIMETHOPRIM/SULFAMETHOXAZOLE: SIGNIFICANT CHANGE UP
-  VANCOMYCIN: SIGNIFICANT CHANGE UP
ANION GAP SERPL CALC-SCNC: 9 MMOL/L — SIGNIFICANT CHANGE UP (ref 5–17)
APPEARANCE UR: CLEAR — SIGNIFICANT CHANGE UP
BILIRUB UR-MCNC: NEGATIVE — SIGNIFICANT CHANGE UP
BUN SERPL-MCNC: 25 MG/DL — HIGH (ref 8–20)
CALCIUM SERPL-MCNC: 8.4 MG/DL — LOW (ref 8.6–10.2)
CHLORIDE SERPL-SCNC: 98 MMOL/L — SIGNIFICANT CHANGE UP (ref 98–107)
CO2 SERPL-SCNC: 29 MMOL/L — SIGNIFICANT CHANGE UP (ref 22–29)
COLOR SPEC: YELLOW — SIGNIFICANT CHANGE UP
CREAT SERPL-MCNC: 1 MG/DL — SIGNIFICANT CHANGE UP (ref 0.5–1.3)
DIFF PNL FLD: ABNORMAL
EPI CELLS # UR: SIGNIFICANT CHANGE UP
GLUCOSE SERPL-MCNC: 119 MG/DL — HIGH (ref 70–115)
GLUCOSE UR QL: NEGATIVE MG/DL — SIGNIFICANT CHANGE UP
HCT VFR BLD CALC: 32.3 % — LOW (ref 42–52)
HGB BLD-MCNC: 11 G/DL — LOW (ref 14–18)
KETONES UR-MCNC: ABNORMAL
LEUKOCYTE ESTERASE UR-ACNC: ABNORMAL
MAGNESIUM SERPL-MCNC: 1.6 MG/DL — LOW (ref 1.8–2.5)
MCHC RBC-ENTMCNC: 34.1 G/DL — SIGNIFICANT CHANGE UP (ref 32–36)
MCHC RBC-ENTMCNC: 34.3 PG — HIGH (ref 27–31)
MCV RBC AUTO: 100.6 FL — HIGH (ref 80–94)
METHOD TYPE: SIGNIFICANT CHANGE UP
NITRITE UR-MCNC: NEGATIVE — SIGNIFICANT CHANGE UP
PH UR: 6 — SIGNIFICANT CHANGE UP (ref 4.8–8)
PLATELET # BLD AUTO: 170 K/UL — SIGNIFICANT CHANGE UP (ref 150–400)
POTASSIUM SERPL-MCNC: 4.3 MMOL/L — SIGNIFICANT CHANGE UP (ref 3.5–5.3)
POTASSIUM SERPL-SCNC: 4.3 MMOL/L — SIGNIFICANT CHANGE UP (ref 3.5–5.3)
PROT UR-MCNC: 15 MG/DL
RBC # BLD: 3.21 M/UL — LOW (ref 4.6–6.2)
RBC # FLD: 13.2 % — SIGNIFICANT CHANGE UP (ref 11–15.6)
RBC CASTS # UR COMP ASSIST: ABNORMAL /HPF (ref 0–4)
SODIUM SERPL-SCNC: 136 MMOL/L — SIGNIFICANT CHANGE UP (ref 135–145)
SP GR SPEC: 1.01 — SIGNIFICANT CHANGE UP (ref 1.01–1.02)
UROBILINOGEN FLD QL: NEGATIVE MG/DL — SIGNIFICANT CHANGE UP
WBC # BLD: 11.5 K/UL — HIGH (ref 4.8–10.8)
WBC # FLD AUTO: 11.5 K/UL — HIGH (ref 4.8–10.8)
WBC UR QL: SIGNIFICANT CHANGE UP

## 2017-04-03 PROCEDURE — 99233 SBSQ HOSP IP/OBS HIGH 50: CPT

## 2017-04-03 PROCEDURE — 99222 1ST HOSP IP/OBS MODERATE 55: CPT

## 2017-04-03 RX ORDER — MAGNESIUM SULFATE 500 MG/ML
2 VIAL (ML) INJECTION ONCE
Qty: 0 | Refills: 0 | Status: COMPLETED | OUTPATIENT
Start: 2017-04-03 | End: 2017-04-03

## 2017-04-03 RX ORDER — HYDROMORPHONE HYDROCHLORIDE 2 MG/ML
1 INJECTION INTRAMUSCULAR; INTRAVENOUS; SUBCUTANEOUS ONCE
Qty: 0 | Refills: 0 | Status: DISCONTINUED | OUTPATIENT
Start: 2017-04-03 | End: 2017-04-03

## 2017-04-03 RX ORDER — TAMSULOSIN HYDROCHLORIDE 0.4 MG/1
0.4 CAPSULE ORAL AT BEDTIME
Qty: 0 | Refills: 0 | Status: DISCONTINUED | OUTPATIENT
Start: 2017-04-03 | End: 2017-04-11

## 2017-04-03 RX ADMIN — OXYCODONE HYDROCHLORIDE 10 MILLIGRAM(S): 5 TABLET ORAL at 03:30

## 2017-04-03 RX ADMIN — Medication 100 MILLIGRAM(S): at 04:45

## 2017-04-03 RX ADMIN — OXYCODONE HYDROCHLORIDE 10 MILLIGRAM(S): 5 TABLET ORAL at 11:04

## 2017-04-03 RX ADMIN — ENOXAPARIN SODIUM 30 MILLIGRAM(S): 100 INJECTION SUBCUTANEOUS at 05:16

## 2017-04-03 RX ADMIN — Medication 100 MILLIGRAM(S): at 21:18

## 2017-04-03 RX ADMIN — Medication 100 MILLIGRAM(S): at 11:05

## 2017-04-03 RX ADMIN — HYDROMORPHONE HYDROCHLORIDE 0.5 MILLIGRAM(S): 2 INJECTION INTRAMUSCULAR; INTRAVENOUS; SUBCUTANEOUS at 13:34

## 2017-04-03 RX ADMIN — Medication 50 GRAM(S): at 16:42

## 2017-04-03 RX ADMIN — HYDROMORPHONE HYDROCHLORIDE 0.5 MILLIGRAM(S): 2 INJECTION INTRAMUSCULAR; INTRAVENOUS; SUBCUTANEOUS at 13:49

## 2017-04-03 RX ADMIN — Medication 1 MILLIGRAM(S): at 11:05

## 2017-04-03 RX ADMIN — OXYCODONE HYDROCHLORIDE 10 MILLIGRAM(S): 5 TABLET ORAL at 02:32

## 2017-04-03 RX ADMIN — OXYCODONE HYDROCHLORIDE 10 MILLIGRAM(S): 5 TABLET ORAL at 16:43

## 2017-04-03 RX ADMIN — OXYCODONE HYDROCHLORIDE 10 MILLIGRAM(S): 5 TABLET ORAL at 11:35

## 2017-04-03 RX ADMIN — OXYCODONE HYDROCHLORIDE 10 MILLIGRAM(S): 5 TABLET ORAL at 17:30

## 2017-04-03 RX ADMIN — LISINOPRIL 40 MILLIGRAM(S): 2.5 TABLET ORAL at 05:16

## 2017-04-03 RX ADMIN — HYDROMORPHONE HYDROCHLORIDE 1 MILLIGRAM(S): 2 INJECTION INTRAMUSCULAR; INTRAVENOUS; SUBCUTANEOUS at 05:35

## 2017-04-03 RX ADMIN — ENOXAPARIN SODIUM 30 MILLIGRAM(S): 100 INJECTION SUBCUTANEOUS at 16:43

## 2017-04-03 RX ADMIN — TAMSULOSIN HYDROCHLORIDE 0.4 MILLIGRAM(S): 0.4 CAPSULE ORAL at 21:18

## 2017-04-03 RX ADMIN — Medication 1 TABLET(S): at 11:05

## 2017-04-03 RX ADMIN — HYDROMORPHONE HYDROCHLORIDE 1 MILLIGRAM(S): 2 INJECTION INTRAMUSCULAR; INTRAVENOUS; SUBCUTANEOUS at 05:16

## 2017-04-03 NOTE — PROGRESS NOTE ADULT - SUBJECTIVE AND OBJECTIVE BOX
MAGED READ    [id number]    History:  The patient is status post I&D left knee, POD # 3. Patient is doing well. The patient's pain is controlled using the prescribed pain medications. The patient is participating in physical therapy and OOB with crutches. Denies nausea, vomiting, chest pain, shortness of breath, abdominal pain or fever. No new complaints. No acute motor or sensory changes are reported.    Vital Signs Last 24 Hrs  T(C): 37.3, Max: 37.3 (04-03 @ 08:19)  T(F): 99.2, Max: 99.2 (04-03 @ 08:19)  HR: 82 (78 - 82)  BP: 137/64 (137/64 - 151/74)  BP(mean): --  RR: 18 (18 - 18)  SpO2: 96% (96% - 96%)  I&O's Summary    I & Os for current day (as of 03 Apr 2017 10:23)  =============================================  IN: 1850 ml / OUT: 1655 ml / NET: 195 ml                            11.0   11.5  )-----------( 170      ( 03 Apr 2017 07:15 )             32.3     03 Apr 2017 07:15    136    |  98     |  25.0   ----------------------------<  119    4.3     |  29.0   |  1.00     Ca    8.4        03 Apr 2017 07:15  Mg     1.6       03 Apr 2017 07:15    Culture - Blood in AM (04.01.17 @ 08:35)    Specimen Source: .Blood Blood    Culture Results:   No growth at 48 hours    Culture - Blood in AM (04.01.17 @ 08:35)    Specimen Source: .Blood Blood    Culture Results:   No growth at 48 hours        MEDICATIONS  (STANDING):  ceFAZolin   IVPB 2000milliGRAM(s) IV Intermittent <User Schedule>  enoxaparin Injectable 30milliGRAM(s) SubCutaneous every 12 hours  insulin lispro (HumaLOG) corrective regimen sliding scale  SubCutaneous three times a day before meals  dextrose 50% Injectable 12.5Gram(s) IV Push once  dextrose 50% Injectable 25Gram(s) IV Push once  dextrose 50% Injectable 25Gram(s) IV Push once  lactated ringers. 1000milliLiter(s) IV Continuous <Continuous>  folic acid 1milliGRAM(s) Oral daily  multivitamin 1Tablet(s) Oral daily  thiamine 100milliGRAM(s) Oral daily  lisinopril 40milliGRAM(s) Oral daily  magnesium sulfate  IVPB 2Gram(s) IV Intermittent once    MEDICATIONS  (PRN):  dextrose Gel 1Dose(s) Oral once PRN Blood Glucose LESS THAN 70 milliGRAM(s)/deciliter  glucagon  Injectable 1milliGRAM(s) IntraMuscular once PRN Glucose LESS THAN 70 milligrams/deciliter  oxyCODONE IR 10milliGRAM(s) Oral every 3 hours PRN Moderate Pain  oxyCODONE IR 5milliGRAM(s) Oral every 3 hours PRN Mild Pain  ondansetron Injectable 4milliGRAM(s) IV Push every 6 hours PRN Nausea and/or Vomiting  HYDROmorphone  Injectable 0.5milliGRAM(s) IV Push every 4 hours PRN severe pain/breakthrough  LORazepam   Injectable 2milliGRAM(s) IV Push every 1 hour PRN Symptom-triggered: each CIWA -Ar score 8 or GREATER      Physical exam: Left lower extremity- The dressing is intact. Minimal bloody dc. Sutures intact. Mod erythema at suture site. Drain intact with 5cc output overnight shift.  No calf tenderness. Sensation to light touch is grossly intact distally. Motor function distally is 5/5. No foot drop. 2+ dorsalis pedis pulse. Capillary refill is less than 2 seconds. No cyanosis.    Primary Orthopedic Assessment:  • S/P I&D left knee, POD#3    Secondary  Medical Assessment(s):   • pos blood cxs mssa  Plan:   • DVT prophylaxis- lovenox, including use of compression devices and ankle pumps  - Drain d/c'd and dsg changed. Serous dc from drain site after removal, compressive dressing applied  • Continue physical therapy  • wbat  • Pain control as clinically indicated  - Continue IV abx per ID- ancef  - Continue medical management  • Incentive spirometry encouraged  • Discharge planning – anticipated discharge is Home

## 2017-04-03 NOTE — PROGRESS NOTE ADULT - SUBJECTIVE AND OBJECTIVE BOX
[facility]  INFECTIOUS DISEASES AND INTERNAL MEDICINE OF [place]  =======================================================  Ketan Ramesh MD  Diplomates American Board of Internal Medicine and Infectious Diseases  =======================================================    MAGED NOBLE     Follow up for septic arthritis and Bacteremia    S/P Left Knee arthroplasty 3/31/17    No fevers or chills    Allergies:  No Known Allergies      Antibiotics:  ceFAZolin   IVPB 2000milliGRAM(s) IV Intermittent every 8 hours       REVIEW OF SYSTEMS:  CONSTITUTIONAL:  No chills  HEENT:   No diplopia or blurred vision. No earache, sore throat or runny nose.  CARDIOVASCULAR:  No pressure, squeezing, strangling, tightness, heaviness or aching about the chest, neck, axilla or epigastrium.  RESPIRATORY:  No cough, shortness of breat  GASTROINTESTINAL:  No nausea, vomiting or diarrhea.  GENITOURINARY:  No dysuria, frequency or urgency.   MUSCULOSKELETAL:  Left knee pain  SKIN:  No change in skin, hair or nails.  NEUROLOGIC:  No paresthesias, fasciculations  PSYCHIATRIC:  No disorder of thought or mood.  ENDOCRINE:  No heat or cold intolerance, polyuria or polydipsia.  HEMATOLOGICAL:  No easy bruising or bleeding.      Physical Exam:  Vital Signs Last 24 Hrs  T(C): 37.3, Max: 37.3 (04-03 @ 08:19)  T(F): 99.2, Max: 99.2 (04-03 @ 08:19)  HR: 82 (78 - 82)  BP: 137/64 (137/64 - 151/74)  RR: 18 (18 - 18)  SpO2: 96% (96% - 96%)    GEN: NAD, pleasant  HEENT: normocephalic and atraumatic. EOMI. PERRL.    NECK: Supple.   LUNGS: Clear to auscultation.  HEART: Regular rate and rhythm  ABDOMEN: Soft, nontender, and nondistended.  Positive bowel sounds.    : no CVA tenderness  EXTREMITIES: LLE knee swelling and leg swelling  MSK: Left knee joint effusion and swelling  NEUROLOGIC: AAOx3  PSYCHIATRIC: Appropriate affect .  SKIN: Left knee redness and warmth        Labs:  03 Apr 2017 07:15    136    |  98     |  25.0   ----------------------------<  119    4.3     |  29.0   |  1.00     Ca    8.4        03 Apr 2017 07:15  Mg     1.6       03 Apr 2017 07:15                            11.0   11.5  )-----------( 170      ( 03 Apr 2017 07:15 )             32.3                 CAPILLARY BLOOD GLUCOSE  113 (03 Apr 2017 10:58)  108 (03 Apr 2017 07:49)  127 (02 Apr 2017 17:00)        RECENT CULTURES:  04-01 .Blood Blood XXXX XXXX   No growth at 48 hours    03-31 .Tissue L Knee Bursa Tissue XXXX XXXX XXXX    03-31 .Tissue L Knee Tissue Staphylococcus aureus   Few White blood cells  No organisms seen   Rare Staphylococcus aureus  Culture in progress    03-31 .Tissue Tissue L Knee Bursa Staphylococcus aureus   Few White blood cells  No organisms seen   Rare Staphylococcus aureus  Culture in progress    03-31 .Surgical Swab Swab L Knee Joint Staphylococcus aureus   Few White blood cells  No organisms seen   Rare Staphylococcus aureus  Culture in progress    03-31 .Surgical Swab Swab Left Knee Staphylococcus aureus   Few White blood cells  No organisms seen   Rare Staphylococcus aureus  Culture in progress    03-31 .Blood Blood XXXX XXXX   Growth in aerobic bottle: Gram Positive Cocci in Clusters  Aerobic Bottle: 2 days and 08:23 Hours to positivity  Anaerobic Bottle: No growth to date  .  TYPE: (C=Critical, N=Notification, A=Abnormal) C  TESTS:  _ GS: Bld Culture  DATE/TIME CALLED: _    03-30 .Blood Blood XXXX XXXX   No growth at 48 hours    03-30 .Body Fluid Synovial Fluid Staphylococcus aureus   Numerous white blood cells  No organisms seen   Rare Staphylococcus aureus  Culture in progress    03-29 .Blood Blood Staphylococcus aureus XXXX   Growth in aerobic and anaerobic bottles: Staphylococcus aureus  Aerobic Bottle: 14.18 Hours to positivity  Anaerobic Bottle: 1 day 12:02 Hours to positivity  .  TYPE: (C=Critical, N=Notification, A=Abnormal) C  TESTS:  _ GS  DATE/TIME CALLED: _ 03/3    03-29 .Blood Blood Staphylococcus aureus XXXX   Growth in aerobic and anaerobic bottles: Staphylococcus aureus  Aerobic Bottle: 14.38 Hours to positivity  Anaerobic Bottle: 1 day 2 Hours to positivity  .  TYPE: (C=Critical, N=Notification, A=Abnormal) C  TESTS:  _   DATE/TIME CALLED: _ 03/30/20        Cultures from Dr Doe's office:  03/28 Blood cultures  No growth x5 days    03/28 Blood cultures  Staphylococcus Aureus (MSSA)    3/24 Left knee aspiration cx  MSSA

## 2017-04-03 NOTE — PROGRESS NOTE ADULT - PROBLEM SELECTOR PLAN 2
Blood culture from Outside lab done from Dr Doe's office with MSSA  Blood cultures from admission also positive with MSSA  TTE with no evidence of Vegetation  Needs MICAELA  Repeat blood culture no growth on 4/1/17

## 2017-04-03 NOTE — PROGRESS NOTE ADULT - PROBLEM SELECTOR PLAN 2
Blood culture 3/29 - MSSA  Blood culture 3/31 - GPC  TTE - no evidence of vegetation  On ancef per ID Blood culture 3/29 - MSSA  Blood culture 3/31 - [facility]  Blood culture 4/1 pending  TTE - no evidence of vegetation  Cards consult pending for MICAELA.  On ancef per ID

## 2017-04-03 NOTE — CONSULT NOTE ADULT - SUBJECTIVE AND OBJECTIVE BOX
CARDIOLOGY CONSULTATION NOTE ([facility])  Consult requested by:  Reina Lujan    Reason for Consultation: need MICAELA for bacteremia    History obtained by: Patient and medical record     obtained: No    Chief complaint:    Patient is a 81y old  Male who presents with a chief complaint of knee pain (29 Mar 2017 18:20)      HPI:  A 82 y/o male who presented with left knee pain and knee arthrocentesis revealing septic arthritis with culture growing MSSA. Patient had left knee arthroplasty on 3/31 and cultures grew MSSA as well as blood cultures growing MSSA. He is currently receiving cefazolin and vacomycin as per ID.  TTE showed no valve vegetations. Cardiology consult is requested for MICAELA in the setting of bacteremia with MSSA. Patient denies any prior cardiac history apart from know cardiac murmur. He denies exertional chest pain ,SOB, LE edema, dizziness or syncope.         REVIEW OF SYMPTOMS: Cardiovascular:  as per HPI Respiratory:  No Dyspnea, No cough,   ;   Genitourinary:  No dysuria, no hematuria; Gastrointestinal:  No nausea, no vomiting. No diarrhea.  No abdominal pain. No dark color stool, no melena ; Neurological: No headache, no dizziness, no slurred speech;  Psychiatric: + anxiety. Except as noted in HPI all other review of systems is negative    ALLERGIES: Allergies    No Known Allergie        MEDICATIONS  (STANDING):  ceFAZolin   IVPB 2000milliGRAM(s) IV Intermittent <User Schedule>  enoxaparin Injectable 30milliGRAM(s) SubCutaneous every 12 hours  insulin lispro (HumaLOG) corrective regimen sliding scale  SubCutaneous three times a day before meals  dextrose 50% Injectable 12.5Gram(s) IV Push once  dextrose 50% Injectable 25Gram(s) IV Push once  dextrose 50% Injectable 25Gram(s) IV Push once  folic acid 1milliGRAM(s) Oral daily  multivitamin 1Tablet(s) Oral daily  lisinopril 40milliGRAM(s) Oral daily  tamsulosin 0.4milliGRAM(s) Oral at bedtime    MEDICATIONS  (PRN):  dextrose Gel 1Dose(s) Oral once PRN Blood Glucose LESS THAN 70 milliGRAM(s)/deciliter  glucagon  Injectable 1milliGRAM(s) IntraMuscular once PRN Glucose LESS THAN 70 milligrams/deciliter  oxyCODONE IR 10milliGRAM(s) Oral every 3 hours PRN Moderate Pain  oxyCODONE IR 5milliGRAM(s) Oral every 3 hours PRN Mild Pain  ondansetron Injectable 4milliGRAM(s) IV Push every 6 hours PRN Nausea and/or Vomiting  HYDROmorphone  Injectable 0.5milliGRAM(s) IV Push every 4 hours PRN severe pain/breakthrough  LORazepam   Injectable 2milliGRAM(s) IV Push every 1 hour PRN Symptom-triggered: each CIWA -Ar score 8 or GREATER        PAST MEDICAL HISTORY  1- DM2  2- HTN ?,   3- heart murmur  4- alc cirrhosis      PAST SURGICAL HISTORY  No significant past surgical history      FAMILY HISTORY:  No pertinent family history in first degree relatives      SOCIAL HISTORY:  drinks alcohol most days of the week, quit smoking      Vital Signs Last 24 Hrs  T(C): 36.8, Max: 37.3 (04-03 @ 08:19)  T(F): 98.3, Max: 99.2 (04-03 @ 08:19)  HR: 102 (80 - 102)  BP: 150/78 (137/64 - 150/78)  BP(mean): --  RR: 18 (18 - 18)  SpO2: 96% (96% - 96%)      PHYSICAL EXAM:  Constitutional: irritable, jittery. No acute distress.   HEENT: Atraumatic and normcephalic , neck is supple . no JVD. No carotid bruit.   CNS: A&Ox3. No focal deficits. EOMI.  Lymph Nodes: Cervical : Not palpable.  Respiratory: CTAB  Cardiovascular: S1S2 RRR. ESM 2/6 over URSB radiating to the apex  Gastrointestinal: Soft non-tender and non distended . +Bowel sounds.   Extremities: Left knee redness and swelling  Psychiatric: Calm . no agitation.  Skin: left knee redness    Intake and output: I & Os for 24h ending 04-03 @ 07:00  =============================================  IN: 1850 ml / OUT: 1655 ml / NET: 195 ml    I & Os for current day (as of 04-03 @ 19:23)  =============================================  IN: 0 ml / OUT: 1200 ml / NET: -1200 ml      LABS:                        11.0   11.5  )-----------( 170      ( 03 Apr 2017 07:15 )             32.3     03 Apr 2017 07:15    136    |  98     |  25.0   ----------------------------<  119    4.3     |  29.0   |  1.00     Ca    8.4        03 Apr 2017 07:15  Mg     1.6       03 Apr 2017 07:15            INTERPRETATION OF TELEMETRY:  ECG: NSR, possible LVH,     RADIOLOGY & ADDITIONAL STUDIES:    X-ray:  no pulmonary congestion, no cardiomegaly    ECHO FINDINGS:    Summary:   1. Technically difficult study.   2. Endocardial visualization was enhanced with intravenous echo contrast.   3. Normal biventricular systolic function. Left ventricular ejection   fraction, by visual estimation, is 60 to 65%.   4. Spectral Doppler shows impaired relaxation pattern of left   ventricular myocardial filling (Grade I diastolic dysfunction).   5. No significant valvular disease.   6. No pericardial effusion.     ;

## 2017-04-03 NOTE — CONSULT NOTE ADULT - ASSESSMENT
82 y/o male with left septic arthritis s/p arthroplasty and bacteremia with MSSA   TTE is negative for vegetations  will obtain MICAELA tomorrow to r/o IE  Keep NPO   no contraindications to MICAELA

## 2017-04-03 NOTE — PROGRESS NOTE ADULT - SUBJECTIVE AND OBJECTIVE BOX
MAGED READ    [id number]    81y      Male    INTERVAL HPI/OVERNIGHT EVENTS: No complaints today.    Hospital course:  80 yo M with h/o DM2, HTN, alcoholic cirrhosis presents from PMD office with +MSSA left knee arthrocentesis. Blood culture 3/29 was positive for MSSA. Doppler study was negative for DVT. 3/31 had I&D of left knee. TTE showed EF 60-65%.     REVIEW OF SYSTEMS:    CONSTITUTIONAL: No fever   RESPIRATORY: No cough; No shortness of breath  CARDIOVASCULAR: No chest pain, palpitations  GASTROINTESTINAL: No abdominal pain. No nausea, vomiting  NEUROLOGICAL: No headaches       Vital Signs Last 24 Hrs  T(C): 37.3, Max: 37.3 (04-03 @ 08:19)  T(F): 99.2, Max: 99.2 (04-03 @ 08:19)  HR: 82 (78 - 82)  BP: 137/64 (137/64 - 151/74)  BP(mean): --  RR: 18 (18 - 18)  SpO2: 96% (96% - 96%) RA    PHYSICAL EXAM:    GENERAL: NAD   HEENT: PERRL, +EOMI, MMM  NECK: soft, Supple, No JVD,   CHEST/LUNG: Clear to percussion bilaterally; No wheezing  HEART: S1S2+, Regular rate and rhythm   ABDOMEN: Soft, Nontender, Nondistended; Bowel sounds present  EXTREMITIES: +LLE pitting edema with MARKUS drain in place  SKIN: warm and dry  NEURO: AAOX3, no focal deficits   PSYCH: normal mood      LABS:                        11.0   11.5  )-----------( 170      ( 03 Apr 2017 07:15 )             32.3     03 Apr 2017 07:15    136    |  98     |  25.0   ----------------------------<  119    4.3     |  29.0   |  1.00     Ca    8.4        03 Apr 2017 07:15  Mg     1.6       03 Apr 2017 07:15              MEDICATIONS  (STANDING):  ceFAZolin   IVPB 2000milliGRAM(s) IV Intermittent <User Schedule>  enoxaparin Injectable 30milliGRAM(s) SubCutaneous every 12 hours  insulin lispro (HumaLOG) corrective regimen sliding scale  SubCutaneous three times a day before meals  dextrose 50% Injectable 12.5Gram(s) IV Push once  dextrose 50% Injectable 25Gram(s) IV Push once  dextrose 50% Injectable 25Gram(s) IV Push once  lactated ringers. 1000milliLiter(s) IV Continuous <Continuous>  folic acid 1milliGRAM(s) Oral daily  multivitamin 1Tablet(s) Oral daily  thiamine 100milliGRAM(s) Oral daily  lisinopril 40milliGRAM(s) Oral daily  magnesium sulfate  IVPB 2Gram(s) IV Intermittent once    MEDICATIONS  (PRN):  dextrose Gel 1Dose(s) Oral once PRN Blood Glucose LESS THAN 70 milliGRAM(s)/deciliter  glucagon  Injectable 1milliGRAM(s) IntraMuscular once PRN Glucose LESS THAN 70 milligrams/deciliter  oxyCODONE IR 10milliGRAM(s) Oral every 3 hours PRN Moderate Pain  oxyCODONE IR 5milliGRAM(s) Oral every 3 hours PRN Mild Pain  ondansetron Injectable 4milliGRAM(s) IV Push every 6 hours PRN Nausea and/or Vomiting  HYDROmorphone  Injectable 0.5milliGRAM(s) IV Push every 4 hours PRN severe pain/breakthrough  LORazepam   Injectable 2milliGRAM(s) IV Push every 1 hour PRN Symptom-triggered: each CIWA -Ar score 8 or GREATER      RADIOLOGY & ADDITIONAL TESTS:

## 2017-04-04 DIAGNOSIS — R33.9 RETENTION OF URINE, UNSPECIFIED: ICD-10-CM

## 2017-04-04 LAB
ANION GAP SERPL CALC-SCNC: 10 MMOL/L — SIGNIFICANT CHANGE UP (ref 5–17)
BUN SERPL-MCNC: 22 MG/DL — HIGH (ref 8–20)
CALCIUM SERPL-MCNC: 8.7 MG/DL — SIGNIFICANT CHANGE UP (ref 8.6–10.2)
CHLORIDE SERPL-SCNC: 97 MMOL/L — LOW (ref 98–107)
CO2 SERPL-SCNC: 28 MMOL/L — SIGNIFICANT CHANGE UP (ref 22–29)
CREAT SERPL-MCNC: 0.89 MG/DL — SIGNIFICANT CHANGE UP (ref 0.5–1.3)
CULTURE RESULTS: SIGNIFICANT CHANGE UP
GLUCOSE SERPL-MCNC: 133 MG/DL — HIGH (ref 70–115)
HCT VFR BLD CALC: 31.7 % — LOW (ref 42–52)
HGB BLD-MCNC: 10.6 G/DL — LOW (ref 14–18)
MAGNESIUM SERPL-MCNC: 1.7 MG/DL — LOW (ref 1.8–2.5)
MCHC RBC-ENTMCNC: 33.3 PG — HIGH (ref 27–31)
MCHC RBC-ENTMCNC: 33.4 G/DL — SIGNIFICANT CHANGE UP (ref 32–36)
MCV RBC AUTO: 99.7 FL — HIGH (ref 80–94)
ORGANISM # SPEC MICROSCOPIC CNT: SIGNIFICANT CHANGE UP
ORGANISM # SPEC MICROSCOPIC CNT: SIGNIFICANT CHANGE UP
PLATELET # BLD AUTO: 145 K/UL — LOW (ref 150–400)
POTASSIUM SERPL-MCNC: 4.2 MMOL/L — SIGNIFICANT CHANGE UP (ref 3.5–5.3)
POTASSIUM SERPL-SCNC: 4.2 MMOL/L — SIGNIFICANT CHANGE UP (ref 3.5–5.3)
RBC # BLD: 3.18 M/UL — LOW (ref 4.6–6.2)
RBC # FLD: 13.3 % — SIGNIFICANT CHANGE UP (ref 11–15.6)
SODIUM SERPL-SCNC: 135 MMOL/L — SIGNIFICANT CHANGE UP (ref 135–145)
SPECIMEN SOURCE: SIGNIFICANT CHANGE UP
WBC # BLD: 8.2 K/UL — SIGNIFICANT CHANGE UP (ref 4.8–10.8)
WBC # FLD AUTO: 8.2 K/UL — SIGNIFICANT CHANGE UP (ref 4.8–10.8)

## 2017-04-04 PROCEDURE — 99233 SBSQ HOSP IP/OBS HIGH 50: CPT

## 2017-04-04 PROCEDURE — 93320 DOPPLER ECHO COMPLETE: CPT | Mod: 26

## 2017-04-04 PROCEDURE — 76376 3D RENDER W/INTRP POSTPROCES: CPT | Mod: 26

## 2017-04-04 PROCEDURE — 93312 ECHO TRANSESOPHAGEAL: CPT | Mod: 26

## 2017-04-04 PROCEDURE — 93325 DOPPLER ECHO COLOR FLOW MAPG: CPT | Mod: 26

## 2017-04-04 RX ORDER — MAGNESIUM SULFATE 500 MG/ML
2 VIAL (ML) INJECTION ONCE
Qty: 0 | Refills: 0 | Status: COMPLETED | OUTPATIENT
Start: 2017-04-04 | End: 2017-04-04

## 2017-04-04 RX ADMIN — ENOXAPARIN SODIUM 30 MILLIGRAM(S): 100 INJECTION SUBCUTANEOUS at 05:00

## 2017-04-04 RX ADMIN — Medication 1 TABLET(S): at 15:46

## 2017-04-04 RX ADMIN — OXYCODONE HYDROCHLORIDE 10 MILLIGRAM(S): 5 TABLET ORAL at 20:40

## 2017-04-04 RX ADMIN — HYDROMORPHONE HYDROCHLORIDE 0.5 MILLIGRAM(S): 2 INJECTION INTRAMUSCULAR; INTRAVENOUS; SUBCUTANEOUS at 12:08

## 2017-04-04 RX ADMIN — Medication 100 MILLIGRAM(S): at 05:00

## 2017-04-04 RX ADMIN — Medication 1 MILLIGRAM(S): at 15:47

## 2017-04-04 RX ADMIN — OXYCODONE HYDROCHLORIDE 10 MILLIGRAM(S): 5 TABLET ORAL at 21:30

## 2017-04-04 RX ADMIN — OXYCODONE HYDROCHLORIDE 10 MILLIGRAM(S): 5 TABLET ORAL at 15:46

## 2017-04-04 RX ADMIN — OXYCODONE HYDROCHLORIDE 10 MILLIGRAM(S): 5 TABLET ORAL at 01:40

## 2017-04-04 RX ADMIN — HYDROMORPHONE HYDROCHLORIDE 0.5 MILLIGRAM(S): 2 INJECTION INTRAMUSCULAR; INTRAVENOUS; SUBCUTANEOUS at 07:32

## 2017-04-04 RX ADMIN — Medication 50 GRAM(S): at 10:14

## 2017-04-04 RX ADMIN — Medication 100 MILLIGRAM(S): at 20:41

## 2017-04-04 RX ADMIN — HYDROMORPHONE HYDROCHLORIDE 0.5 MILLIGRAM(S): 2 INJECTION INTRAMUSCULAR; INTRAVENOUS; SUBCUTANEOUS at 12:23

## 2017-04-04 RX ADMIN — OXYCODONE HYDROCHLORIDE 10 MILLIGRAM(S): 5 TABLET ORAL at 07:35

## 2017-04-04 RX ADMIN — OXYCODONE HYDROCHLORIDE 10 MILLIGRAM(S): 5 TABLET ORAL at 00:43

## 2017-04-04 RX ADMIN — OXYCODONE HYDROCHLORIDE 10 MILLIGRAM(S): 5 TABLET ORAL at 09:59

## 2017-04-04 RX ADMIN — ENOXAPARIN SODIUM 30 MILLIGRAM(S): 100 INJECTION SUBCUTANEOUS at 17:01

## 2017-04-04 RX ADMIN — LISINOPRIL 40 MILLIGRAM(S): 2.5 TABLET ORAL at 05:00

## 2017-04-04 RX ADMIN — TAMSULOSIN HYDROCHLORIDE 0.4 MILLIGRAM(S): 0.4 CAPSULE ORAL at 20:40

## 2017-04-04 RX ADMIN — Medication 100 MILLIGRAM(S): at 15:47

## 2017-04-04 NOTE — PROGRESS NOTE ADULT - SUBJECTIVE AND OBJECTIVE BOX
[facility]  INFECTIOUS DISEASES AND INTERNAL MEDICINE OF [place]  =======================================================  Ketan Ramesh MD  Diplomates American Board of Internal Medicine and Infectious Diseases  =======================================================    MAGED NOBLE     Follow up for septic arthritis and Bacteremia    S/P Left Knee arthroplasty 3/31/17    No fevers or chills    Allergies:  No Known Allergies      Antibiotics:  ceFAZolin   IVPB 2000milliGRAM(s) IV Intermittent every 8 hours       REVIEW OF SYSTEMS:  CONSTITUTIONAL:  No chills  HEENT:   No diplopia or blurred vision. No earache, sore throat or runny nose.  CARDIOVASCULAR:  No pressure, squeezing, strangling, tightness, heaviness or aching about the chest, neck, axilla or epigastrium.  RESPIRATORY:  No cough, shortness of breat  GASTROINTESTINAL:  No nausea, vomiting or diarrhea.  GENITOURINARY:  No dysuria, frequency or urgency.   MUSCULOSKELETAL:  Left knee pain  SKIN:  No change in skin, hair or nails.  NEUROLOGIC:  No paresthesias, fasciculations  PSYCHIATRIC:  No disorder of thought or mood.  ENDOCRINE:  No heat or cold intolerance, polyuria or polydipsia.  HEMATOLOGICAL:  No easy bruising or bleeding.      Physical Exam:  Vital Signs Last 24 Hrs  T(C): 36.7, Max: 37.2 (- @ 07:28)  T(F): 98, Max: 99 (04-04 @ 07:28)  HR: 81 (81 - 102)  BP: 142/62 (142/62 - 152/74)  BP(mean): 3 (3 - 3)  RR: 17 (17 - 18)  SpO2: 93% (93% - 97%)    GEN: NAD, pleasant  HEENT: normocephalic and atraumatic. EOMI. PERRL.    NECK: Supple.   LUNGS: Clear to auscultation.  HEART: Regular rate and rhythm  ABDOMEN: Soft, nontender, and nondistended.  Positive bowel sounds.    : no CVA tenderness  EXTREMITIES: LLE knee swelling and leg swelling  MSK: Left knee joint effusion and swelling  NEUROLOGIC: AAOx3  PSYCHIATRIC: Appropriate affect .  SKIN: Left knee redness and warmth        Labs:  2017 07:03    135    |  97     |  22.0   ----------------------------<  133    4.2     |  28.0   |  0.89     Ca    8.7        2017 07:03  Mg     1.7       2017 07:03                          10.6   8.2   )-----------( 145      ( 2017 07:03 )             31.7       Urinalysis Basic - ( 2017 19:52 )    Color: Yellow / Appearance: Clear / S.010 / pH: x  Gluc: x / Ketone: Trace  / Bili: Negative / Urobili: Negative mg/dL   Blood: x / Protein: 15 mg/dL / Nitrite: Negative   Leuk Esterase: Trace / RBC: 25-50 /HPF / WBC 0-2   Sq Epi: x / Non Sq Epi: Occasional / Bacteria: x        RECENT CULTURES:   .Blood Blood-Peripheral XXXX XXXX   Growth in anaerobic bottle: Gram Positive Cocci in Clusters  Anaerobic Bottle: 1 day:06:04 Hours to positivity  Aerobic Bottle: No growth to date  .  TYPE: (C=Critical, N=Notification, A=Abnormal) C  TESTS:  _ BLD   DATE/TIME CALLED: _ 2017 .Blood Blood XXXX XXXX   No growth at 48 hours     .Tissue L Knee Bursa Tissue XXXX XXXX XXXX     .Tissue L Knee Tissue Staphylococcus aureus   Few White blood cells  No organisms seen   Rare Staphylococcus aureus  Culture in progress     .Tissue Tissue L Knee Bursa Staphylococcus aureus   Few White blood cells  No organisms seen   Rare Staphylococcus aureus  Culture in progress     .Surgical Swab Swab L Knee Joint Staphylococcus aureus   Few White blood cells  No organisms seen   Rare Staphylococcus aureus  Culture in progress     .Surgical Swab Swab Left Knee Staphylococcus aureus   Few White blood cells  No organisms seen   Rare Staphylococcus aureus  Culture in progress     .Blood Blood Staphylococcus aureus XXXX   Growth in aerobic bottle: Staphylococcus aureus  Aerobic Bottle: 2 days and 08:23 Hours to positivity  Anaerobic Bottle: No growth to date  .  TYPE: (C=Critical, N=Notification, A=Abnormal) C  TESTS:  _ GS: Bld Culture  DATE/TIME CALLED: _  .Blood Blood XXXX XXXX   No growth at 48 hours     .Body Fluid Synovial Fluid Staphylococcus aureus   Numerous white blood cells  No organisms seen   Rare Staphylococcus aureus  Culture in progress     .Blood Blood Staphylococcus aureus XXXX   Growth in aerobic and anaerobic bottles: Staphylococcus aureus  Aerobic Bottle: 14.18 Hours to positivity  Anaerobic Bottle: 1 day 12:02 Hours to positivity  .  TYPE: (C=Critical, N=Notification, A=Abnormal) C  TESTS:  _   DATE/TIME CALLED: _ 03/3    03-29 .Blood Blood Staphylococcus aureus XXXX   Growth in aerobic and anaerobic bottles: Staphylococcus aureus  Aerobic Bottle: 14.38 Hours to positivity  Anaerobic Bottle: 1 day 2 Hours to positivity  .  TYPE: (C=Critical, N=Notification, A=Abnormal) C  TESTS:  _   DATE/TIME CALLED: _ 20      Cultures from Dr Doe's office:   Blood cultures  No growth x5 days     Blood cultures  Staphylococcus Aureus (MSSA)    3/24 Left knee aspiration cx  MSSA

## 2017-04-04 NOTE — PROGRESS NOTE ADULT - PROBLEM SELECTOR PLAN 2
Blood culture from Outside lab done from Dr Doe's office with MSSA  Blood cultures from admission also positive with MSSA  TTE with no evidence of Vegetation  Needs MICAELA  Repeat blood culture pending since he is persistently bacteremic

## 2017-04-04 NOTE — PROGRESS NOTE ADULT - PROBLEM SELECTOR PLAN 3
Straight cath yesterday revealed 700cc urine.  Continued to complain about difficulty urinating.  Replace with sneed and start

## 2017-04-04 NOTE — PROGRESS NOTE ADULT - SUBJECTIVE AND OBJECTIVE BOX
MAGED READ    [id number]    81y      Male    INTERVAL HPI/OVERNIGHT EVENTS: Repeat blood cultures positive. States that he has trouble sleeping at night.     Hospital course:  82 yo M with h/o DM2, HTN, alcoholic cirrhosis presents from PMD office with +MSSA left knee arthrocentesis. Blood culture 3/29 was positive for MSSA. Doppler study was negative for DVT. 3/31 had I&D of left knee. TTE showed EF 60-65%.     REVIEW OF SYSTEMS:    CONSTITUTIONAL: No fevers  RESPIRATORY: No cough; No shortness of breath  CARDIOVASCULAR: No chest pain, palpitations  GASTROINTESTINAL: No abdominal pain. No nausea, vomiting  NEUROLOGICAL: No headaches       Vital Signs Last 24 Hrs  T(C): 37.2, Max: 37.2 ( @ 07:28)  T(F): 99, Max: 99 ( @ 07:28)  HR: 85 (85 - 102)  BP: 142/68 (142/68 - 152/74)  BP(mean): 3 (3 - 3)  RR: 18 (17 - 18)  SpO2: 97% (97% - 97%)    PHYSICAL EXAM:    GENERAL: NAD  HEENT: PERRL, +EOMI  NECK: soft, Supple, No JVD,   CHEST/LUNG: Clear to percussion bilaterally; No wheezing  HEART: S1S2+, Regular rate and rhythm; +3/6 systolic murmur  ABDOMEN: Soft, Nontender, Nondistended; Bowel sounds present  EXTREMITIES: +LLE edema  SKIN: warm and dry  NEURO: AAOX3   PSYCH: normal mood      LABS:                        10.6   8.2   )-----------( 145      ( 2017 07:03 )             31.7     2017 07:03    135    |  97     |  22.0   ----------------------------<  133    4.2     |  28.0   |  0.89     Ca    8.7        2017 07:03  Mg     1.7       2017 07:03        Urinalysis Basic - ( 2017 19:52 )    Color: Yellow / Appearance: Clear / S.010 / pH: x  Gluc: x / Ketone: Trace  / Bili: Negative / Urobili: Negative mg/dL   Blood: x / Protein: 15 mg/dL / Nitrite: Negative   Leuk Esterase: Trace / RBC: 25-50 /HPF / WBC 0-2   Sq Epi: x / Non Sq Epi: Occasional / Bacteria: x          MEDICATIONS  (STANDING):  ceFAZolin   IVPB 2000milliGRAM(s) IV Intermittent <User Schedule>  enoxaparin Injectable 30milliGRAM(s) SubCutaneous every 12 hours  insulin lispro (HumaLOG) corrective regimen sliding scale  SubCutaneous three times a day before meals  dextrose 50% Injectable 12.5Gram(s) IV Push once  dextrose 50% Injectable 25Gram(s) IV Push once  dextrose 50% Injectable 25Gram(s) IV Push once  folic acid 1milliGRAM(s) Oral daily  multivitamin 1Tablet(s) Oral daily  lisinopril 40milliGRAM(s) Oral daily  tamsulosin 0.4milliGRAM(s) Oral at bedtime    MEDICATIONS  (PRN):  dextrose Gel 1Dose(s) Oral once PRN Blood Glucose LESS THAN 70 milliGRAM(s)/deciliter  glucagon  Injectable 1milliGRAM(s) IntraMuscular once PRN Glucose LESS THAN 70 milligrams/deciliter  oxyCODONE IR 10milliGRAM(s) Oral every 3 hours PRN Moderate Pain  oxyCODONE IR 5milliGRAM(s) Oral every 3 hours PRN Mild Pain  ondansetron Injectable 4milliGRAM(s) IV Push every 6 hours PRN Nausea and/or Vomiting  HYDROmorphone  Injectable 0.5milliGRAM(s) IV Push every 4 hours PRN severe pain/breakthrough  LORazepam   Injectable 2milliGRAM(s) IV Push every 1 hour PRN Symptom-triggered: each CIWA -Ar score 8 or GREATER      RADIOLOGY & ADDITIONAL TESTS:

## 2017-04-04 NOTE — DIETITIAN INITIAL EVALUATION ADULT. - OTHER INFO
Pt admitted for left knee cellulitis s/p I & D. Pt currently NPO. Previously tolerating diet with good >75% PO intake. Declined diet education. No c/o GI distress.

## 2017-04-04 NOTE — PROGRESS NOTE ADULT - SUBJECTIVE AND OBJECTIVE BOX
Patient seen and examined at bedside. Comfortable in bed. Pain controlled. No complaints.    Vital Signs Last 24 Hrs  T(C): 37.2, Max: 37.2 (04-04 @ 07:28)  T(F): 99, Max: 99 (04-04 @ 07:28)  HR: 85 (85 - 102)  BP: 142/68 (142/68 - 152/74)  BP(mean): 3 (3 - 3)  RR: 18 (17 - 18)  SpO2: 97% (97% - 97%)    LLE: Dressing C/D/I. Sutures C/D/I. No active drainage. + mild erythema. Sensation in tact to light touch. + dorsi/plantarflexion. Ext warm, cap refill brisk. Calf soft NT B/L.                          10.6   8.2   )-----------( 145      ( 04 Apr 2017 07:03 )             31.7   Culture - Tissue with Gram Stain (03.31.17 @ 13:05)    -  Gentamicin: S <=4    -  Levofloxacin: S <=1    -  Moxifloxacin(Aerobic): S <=0.5    -  Tetra/Doxy: S <=4    -  Trimethoprim/Sulfamethoxazole: S <=0.5/9.5    -  Vancomycin: S 2    -  Erythromycin: R >4    -  Penicillin: R >8    Gram Stain:   Few White blood cells  No organisms seen    -  Ampicillin/Sulbactam: S <=8/4    -  Clindamycin: S <=0.5    -  Oxacillin: S 0.5    -  Cefazolin: S <=4    -  Ciprofloxacin: S <=1    -  RIF- Rifampin: S <=1    Specimen Source: .Tissue L Knee Tissue    Culture Results:   Rare Staphylococcus aureus  Culture in progress    Organism Identification: Staphylococcus aureus    Organism: Staphylococcus aureus    Method Type: LISSA        A/P: 81 y.o M s/p left knee I&D POD#4  - WBAT  - abx as per ID  - cont care primary team

## 2017-04-04 NOTE — PROGRESS NOTE ADULT - PROBLEM SELECTOR PLAN 2
Blood culture 3/29 - MSSA  Blood culture 3/31, 4/1 - GPC  Blood culture 4/2 No pending  TTE - no evidence of vegetation  MICAELA pending for today  Abx choice to be determined by ID.

## 2017-04-05 DIAGNOSIS — A41.01 SEPSIS DUE TO METHICILLIN SUSCEPTIBLE STAPHYLOCOCCUS AUREUS: ICD-10-CM

## 2017-04-05 DIAGNOSIS — R78.81 BACTEREMIA: ICD-10-CM

## 2017-04-05 LAB
-  AMPICILLIN/SULBACTAM: SIGNIFICANT CHANGE UP
-  CEFAZOLIN: SIGNIFICANT CHANGE UP
-  CIPROFLOXACIN: SIGNIFICANT CHANGE UP
-  CLINDAMYCIN: SIGNIFICANT CHANGE UP
-  ERYTHROMYCIN: SIGNIFICANT CHANGE UP
-  GENTAMICIN: SIGNIFICANT CHANGE UP
-  LEVOFLOXACIN: SIGNIFICANT CHANGE UP
-  MOXIFLOXACIN(AEROBIC): SIGNIFICANT CHANGE UP
-  OXACILLIN: SIGNIFICANT CHANGE UP
-  PENICILLIN: SIGNIFICANT CHANGE UP
-  RIFAMPIN: SIGNIFICANT CHANGE UP
-  TETRACYCLINE: SIGNIFICANT CHANGE UP
-  TRIMETHOPRIM/SULFAMETHOXAZOLE: SIGNIFICANT CHANGE UP
-  VANCOMYCIN: SIGNIFICANT CHANGE UP
ANION GAP SERPL CALC-SCNC: 8 MMOL/L — SIGNIFICANT CHANGE UP (ref 5–17)
BUN SERPL-MCNC: 19 MG/DL — SIGNIFICANT CHANGE UP (ref 8–20)
C DIFF BY PCR RESULT: DETECTED
C DIFF TOX GENS STL QL NAA+PROBE: SIGNIFICANT CHANGE UP
CALCIUM SERPL-MCNC: 8.2 MG/DL — LOW (ref 8.6–10.2)
CHLORIDE SERPL-SCNC: 98 MMOL/L — SIGNIFICANT CHANGE UP (ref 98–107)
CO2 SERPL-SCNC: 27 MMOL/L — SIGNIFICANT CHANGE UP (ref 22–29)
CREAT SERPL-MCNC: 0.84 MG/DL — SIGNIFICANT CHANGE UP (ref 0.5–1.3)
CULTURE RESULTS: NO GROWTH — SIGNIFICANT CHANGE UP
CULTURE RESULTS: SIGNIFICANT CHANGE UP
GLUCOSE SERPL-MCNC: 118 MG/DL — HIGH (ref 70–115)
HCT VFR BLD CALC: 30.3 % — LOW (ref 42–52)
HGB BLD-MCNC: 10.3 G/DL — LOW (ref 14–18)
MAGNESIUM SERPL-MCNC: 1.7 MG/DL — LOW (ref 1.8–2.5)
MCHC RBC-ENTMCNC: 34 G/DL — SIGNIFICANT CHANGE UP (ref 32–36)
MCHC RBC-ENTMCNC: 34.1 PG — HIGH (ref 27–31)
MCV RBC AUTO: 100.3 FL — HIGH (ref 80–94)
METHOD TYPE: SIGNIFICANT CHANGE UP
ORGANISM # SPEC MICROSCOPIC CNT: SIGNIFICANT CHANGE UP
PLATELET # BLD AUTO: 137 K/UL — LOW (ref 150–400)
POTASSIUM SERPL-MCNC: 3.9 MMOL/L — SIGNIFICANT CHANGE UP (ref 3.5–5.3)
POTASSIUM SERPL-SCNC: 3.9 MMOL/L — SIGNIFICANT CHANGE UP (ref 3.5–5.3)
RBC # BLD: 3.02 M/UL — LOW (ref 4.6–6.2)
RBC # FLD: 13.4 % — SIGNIFICANT CHANGE UP (ref 11–15.6)
SODIUM SERPL-SCNC: 133 MMOL/L — LOW (ref 135–145)
SPECIMEN SOURCE: SIGNIFICANT CHANGE UP
WBC # BLD: 10.5 K/UL — SIGNIFICANT CHANGE UP (ref 4.8–10.8)
WBC # FLD AUTO: 10.5 K/UL — SIGNIFICANT CHANGE UP (ref 4.8–10.8)

## 2017-04-05 PROCEDURE — 99233 SBSQ HOSP IP/OBS HIGH 50: CPT

## 2017-04-05 PROCEDURE — 74177 CT ABD & PELVIS W/CONTRAST: CPT | Mod: 26

## 2017-04-05 RX ORDER — VANCOMYCIN HCL 1 G
125 VIAL (EA) INTRAVENOUS EVERY 6 HOURS
Qty: 0 | Refills: 0 | Status: DISCONTINUED | OUTPATIENT
Start: 2017-04-05 | End: 2017-04-09

## 2017-04-05 RX ORDER — MAGNESIUM SULFATE 500 MG/ML
2 VIAL (ML) INJECTION ONCE
Qty: 0 | Refills: 0 | Status: COMPLETED | OUTPATIENT
Start: 2017-04-05 | End: 2017-04-05

## 2017-04-05 RX ORDER — SODIUM CHLORIDE 9 MG/ML
1000 INJECTION INTRAMUSCULAR; INTRAVENOUS; SUBCUTANEOUS
Qty: 0 | Refills: 0 | Status: DISCONTINUED | OUTPATIENT
Start: 2017-04-05 | End: 2017-04-08

## 2017-04-05 RX ADMIN — HYDROMORPHONE HYDROCHLORIDE 0.5 MILLIGRAM(S): 2 INJECTION INTRAMUSCULAR; INTRAVENOUS; SUBCUTANEOUS at 08:26

## 2017-04-05 RX ADMIN — Medication 100 MILLIGRAM(S): at 05:55

## 2017-04-05 RX ADMIN — Medication 100 MILLIGRAM(S): at 21:15

## 2017-04-05 RX ADMIN — HYDROMORPHONE HYDROCHLORIDE 0.5 MILLIGRAM(S): 2 INJECTION INTRAMUSCULAR; INTRAVENOUS; SUBCUTANEOUS at 17:10

## 2017-04-05 RX ADMIN — OXYCODONE HYDROCHLORIDE 10 MILLIGRAM(S): 5 TABLET ORAL at 11:20

## 2017-04-05 RX ADMIN — HYDROMORPHONE HYDROCHLORIDE 0.5 MILLIGRAM(S): 2 INJECTION INTRAMUSCULAR; INTRAVENOUS; SUBCUTANEOUS at 07:31

## 2017-04-05 RX ADMIN — SODIUM CHLORIDE 100 MILLILITER(S): 9 INJECTION INTRAMUSCULAR; INTRAVENOUS; SUBCUTANEOUS at 21:16

## 2017-04-05 RX ADMIN — OXYCODONE HYDROCHLORIDE 10 MILLIGRAM(S): 5 TABLET ORAL at 16:20

## 2017-04-05 RX ADMIN — SODIUM CHLORIDE 100 MILLILITER(S): 9 INJECTION INTRAMUSCULAR; INTRAVENOUS; SUBCUTANEOUS at 16:04

## 2017-04-05 RX ADMIN — HYDROMORPHONE HYDROCHLORIDE 0.5 MILLIGRAM(S): 2 INJECTION INTRAMUSCULAR; INTRAVENOUS; SUBCUTANEOUS at 21:16

## 2017-04-05 RX ADMIN — Medication 1 TABLET(S): at 11:26

## 2017-04-05 RX ADMIN — HYDROMORPHONE HYDROCHLORIDE 0.5 MILLIGRAM(S): 2 INJECTION INTRAMUSCULAR; INTRAVENOUS; SUBCUTANEOUS at 21:31

## 2017-04-05 RX ADMIN — Medication 100 MILLIGRAM(S): at 12:32

## 2017-04-05 RX ADMIN — HYDROMORPHONE HYDROCHLORIDE 0.5 MILLIGRAM(S): 2 INJECTION INTRAMUSCULAR; INTRAVENOUS; SUBCUTANEOUS at 17:25

## 2017-04-05 RX ADMIN — OXYCODONE HYDROCHLORIDE 10 MILLIGRAM(S): 5 TABLET ORAL at 07:00

## 2017-04-05 RX ADMIN — OXYCODONE HYDROCHLORIDE 10 MILLIGRAM(S): 5 TABLET ORAL at 05:52

## 2017-04-05 RX ADMIN — Medication 1 MILLIGRAM(S): at 11:26

## 2017-04-05 RX ADMIN — Medication 50 GRAM(S): at 11:25

## 2017-04-05 RX ADMIN — LISINOPRIL 40 MILLIGRAM(S): 2.5 TABLET ORAL at 05:54

## 2017-04-05 RX ADMIN — ENOXAPARIN SODIUM 30 MILLIGRAM(S): 100 INJECTION SUBCUTANEOUS at 05:53

## 2017-04-05 RX ADMIN — TAMSULOSIN HYDROCHLORIDE 0.4 MILLIGRAM(S): 0.4 CAPSULE ORAL at 21:16

## 2017-04-05 RX ADMIN — OXYCODONE HYDROCHLORIDE 10 MILLIGRAM(S): 5 TABLET ORAL at 10:38

## 2017-04-05 RX ADMIN — ENOXAPARIN SODIUM 30 MILLIGRAM(S): 100 INJECTION SUBCUTANEOUS at 17:10

## 2017-04-05 NOTE — PROGRESS NOTE ADULT - SUBJECTIVE AND OBJECTIVE BOX
Patient seen and examined at bedside. Comfortable in bed. Pain controlled. Patient notes his left knee feels better since yesterday and he was not able to bend it like he can now. Patient also states the redness around knee has improved significantly. Patient has no other complaints.    Vital Signs Last 24 Hrs  T(C): 36.9, Max: 37.4 (04-04 @ 23:20)  T(F): 98.5, Max: 99.4 (04-04 @ 23:20)  HR: 81 (81 - 81)  BP: 141/63 (141/63 - 167/63)  BP(mean): --  RR: 18 (17 - 18)  SpO2: 93% (93% - 98%)    LLE: Dressing C/D/I. Gross sensation in tact to light touch distally.  EHL/TA/GS/FHL intact, DP 2+ Calf soft NT B/L.  No significant erythema noted thigh or knee. Minimal swelling  AAROM left knee 0-40 degrees limited by pain.                          10.6   8.2   )-----------( 145      ( 04 Apr 2017 07:03 )             31.7       Culture - Tissue with Gram Stain (03.31.17 @ 13:05)    -  Gentamicin: S <=4    -  Levofloxacin: S <=1    -  Moxifloxacin(Aerobic): S <=0.5    -  Tetra/Doxy: S <=4    -  Trimethoprim/Sulfamethoxazole: S <=0.5/9.5    -  Vancomycin: S 2    -  Erythromycin: R >4    -  Penicillin: R >8    Gram Stain:   Few White blood cells  No organisms seen    -  Ampicillin/Sulbactam: S <=8/4    -  Clindamycin: S <=0.5    -  Oxacillin: S 0.5    -  Cefazolin: S <=4    -  Ciprofloxacin: S <=1    -  RIF- Rifampin: S <=1    Specimen Source: .Tissue L Knee Tissue    Culture Results:   Rare Staphylococcus aureus  Culture in progress    Organism Identification: Staphylococcus aureus    Organism: Staphylococcus aureus    Method Type: LISSA      A/P: 81 y.o M s/p left knee I&D POD#5  - WBAT  - abx as per ID  -f/u final cx results  - [facility] care primary team

## 2017-04-05 NOTE — PROGRESS NOTE ADULT - ASSESSMENT
PERSISTENT MSSA BACTEREMIA OF UNKNOWN SOURCE - UNKNOWN PRIMARY SITE  DO NOT BELIEVE KNEE IS PRIMARY  NEEDS FURTHER EVAL = MRI TLS SPINE  CT ABD PELVIS  CHK CXR  SERIAL BLOODS  NOT AN ANTIBIOTIC FAILURE FOR MSSA

## 2017-04-05 NOTE — CONSULT NOTE ADULT - SUBJECTIVE AND OBJECTIVE BOX
HPI:  Pt has had left knee pain since Monday gradually worsening pain and edema over the days. He saw his PMD who ordered MRI and sent him to Ortho. Ortho tapped the knee outpatient that grew MSSA per Dr Doe. MRi showed torn meniscus and surgical repair was planned for this week. He was given Keflex and pain meds which he took for 4 days. He also had a Doppler study that was neg for DVT. But since pain worsened he came here. no fever, trauma, or any other associated symptoms  PMH- DM2, HTN ?, heart murmur, alc cirrhosis  SH- quit tobacco 50 yrs ago, alcohol use most days of the week. since last monday has not used any, not in withdrawal  Meds- obtained from pharmacy (29 Mar 2017 18:20)      PAST MEDICAL & SURGICAL HISTORY:  DM (diabetes mellitus)  No significant past surgical history      REVIEW OF SYSTEMS:    Constitutional: No fever, weight loss or fatigue  Eyes: No eye pain, visual disturbances, or discharge  ENMT:  No difficulty hearing, tinnitus, vertigo; No sinus or throat pain  Respiratory: No cough, wheezing, chills or hemoptysis  Cardiovascular: No chest pain, palpitations, shortness of breath, dizziness or leg swelling  Gastrointestinal: No abdominal or epigastric pain. No nausea, vomiting or hematemesis; No diarrhea or constipation. No melena or hematochezia.  Genitourinary: Pt has urinary hesitancy and slow stream. Denies dysuria  Rectal: No pain, hemorrhoids or incontinence  Neurological: No headaches, memory loss, loss of strength, numbness or tremors  Skin: No itching, burning, rashes or lesions   Lymph Nodes: No enlarged glands  Musculoskeletal: No joint pain or swelling; No muscle, back or extremity pain  Psychiatric: No depression, anxiety, mood swings or difficulty sleeping  Heme/Lymph: No easy bruising or bleeding gums  Allergy and Immunologic: No hives or eczema    MEDICATIONS  (STANDING):  ceFAZolin   IVPB 2000milliGRAM(s) IV Intermittent <User Schedule>  enoxaparin Injectable 30milliGRAM(s) SubCutaneous every 12 hours  insulin lispro (HumaLOG) corrective regimen sliding scale  SubCutaneous three times a day before meals  dextrose 50% Injectable 12.5Gram(s) IV Push once  dextrose 50% Injectable 25Gram(s) IV Push once  dextrose 50% Injectable 25Gram(s) IV Push once  folic acid 1milliGRAM(s) Oral daily  multivitamin 1Tablet(s) Oral daily  lisinopril 40milliGRAM(s) Oral daily  tamsulosin 0.4milliGRAM(s) Oral at bedtime  sodium chloride 0.9%. 1000milliLiter(s) IV Continuous <Continuous>    MEDICATIONS  (PRN):  dextrose Gel 1Dose(s) Oral once PRN Blood Glucose LESS THAN 70 milliGRAM(s)/deciliter  glucagon  Injectable 1milliGRAM(s) IntraMuscular once PRN Glucose LESS THAN 70 milligrams/deciliter  oxyCODONE IR 10milliGRAM(s) Oral every 3 hours PRN Moderate Pain  oxyCODONE IR 5milliGRAM(s) Oral every 3 hours PRN Mild Pain  ondansetron Injectable 4milliGRAM(s) IV Push every 6 hours PRN Nausea and/or Vomiting  HYDROmorphone  Injectable 0.5milliGRAM(s) IV Push every 4 hours PRN severe pain/breakthrough      Allergies    No Known Allergies    Intolerances        SOCIAL HISTORY:    FAMILY HISTORY:  No pertinent family history in first degree relatives      Vital Signs Last 24 Hrs  T(C): 37.3, Max: 37.4 (04-04 @ 23:20)  T(F): 99.1, Max: 99.4 (04-04 @ 23:20)  HR: 80 (79 - 81)  BP: 166/71 (120/56 - 167/63)  BP(mean): --  RR: 18 (18 - 18)  SpO2: 98% (93% - 98%)    PHYSICAL EXAM:    General: Well developed; well nourished; in no acute distress  Head: Normocephalic; atraumatic  Respiratory: No wheezes, rales or rhonchi  Cardiovascular: Regular rate and rhythm. S1 and S2 Normal; No murmurs, gallops or rubs  Gastrointestinal: Soft non-tender non-distended; Normal bowel sounds; No hepatosplenomegaly  Genitourinary: No costovertebral angle tenderness.  Urinary bladder is clinically not distended  Extremities: Normal range of motion, No clubbing, cyanosis or edema  Vascular: Peripheral pulses palpable 2+ bilaterally  Neurological: Alert and oriented x4  Skin: Warm and dry. No acute rash  Musculoskeletal: Normal gait, tone, without deformities  Psychiatric: Cooperative and appropriate  MAI- Prostate exam is NOT fluctuant nor suspicious.      LABS:                        10.3   10.5  )-----------( 137      ( 2017 09:02 )             30.3     04-05    133<L>  |  98  |  19.0  ----------------------------<  118<H>  3.9   |  27.0  |  0.84    Ca    8.2<L>      2017 09:02  Mg     1.7     04-05        Urinalysis Basic - ( 2017 19:52 )    Color: Yellow / Appearance: Clear / S.010 / pH: x  Gluc: x / Ketone: Trace  / Bili: Negative / Urobili: Negative mg/dL   Blood: x / Protein: 15 mg/dL / Nitrite: Negative   Leuk Esterase: Trace / RBC: 25-50 /HPF / WBC 0-2   Sq Epi: x / Non Sq Epi: Occasional / Bacteria: x        RADIOLOGY & ADDITIONAL STUDIES:

## 2017-04-05 NOTE — PROVIDER CONTACT NOTE (CRITICAL VALUE NOTIFICATION) - TEST AND RESULT REPORTED:
+ cdiff
+bc gram + cocci in clusters anerobic bottle
+bc groeth anerobin bottle  gtam + cocci in clusters
Blood culture 3/31 positive gram cocci in clusters
2 sets aerobic gram positive cocci in clusters

## 2017-04-05 NOTE — PROGRESS NOTE ADULT - SUBJECTIVE AND OBJECTIVE BOX
[facility] [place] QUAN DE LEON MD FACP   MAGED RAYA MD  Diplomates American Board of Internal Medicine and Infectious Diseases      MAGED NOBLE  MRN-[id number]  81y    INTERVAL HPI/OVERNIGHT EVENTS:  STILL BACTEREMIC DESPITE APPROP ABS  NO PROSTHETIC IMPLANTS  NO C/O BACK PAIN  NO GI SXS    Vital Signs Last 24 Hrs  T(C): 37.4, Max: 37.4 (04-04 @ 23:20)  T(F): 99.4, Max: 99.4 (04-04 @ 23:20)  HR: 79 (79 - 81)  BP: 120/56 (120/56 - 167/63)  BP(mean): --  RR: 18 (17 - 18)  SpO2: 97% (93% - 98%)    ANTIBIOTICS  ceFAZolin   IVPB 2000milliGRAM(s) IV Intermittent <User Schedule>      Allergies    No Known Allergies    Intolerances        REVIEW OF SYSTEMS:NEG    PHYSICAL EXAM:  Vital Signs Last 24 Hrs  T(C): 37.4, Max: 37.4 (04-04 @ 23:20)  T(F): 99.4, Max: 99.4 (04-04 @ 23:20)  HR: 79 (79 - 81)  BP: 120/56 (120/56 - 167/63)  BP(mean): --  RR: 18 (17 - 18)  SpO2: 97% (93% - 98%)      GEN: NAD, pleasant  HEENT: normocephalic and atraumatic. EOMI. JASON. Moist mucosa. Clear Posterior pharynx.  NECK: Supple. No carotid bruits.  No lymphadenopathy or thyromegaly.  LUNGS: Clear to auscultation.  HEART: Regular rate and rhythm without murmur.  ABDOMEN: Soft, nontender, and nondistended.  Positive bowel sounds.  No hepatosplenomegaly was noted.  EXTREMITIES: Without any cyanosis, clubbing, rash, lesions or edema.  NEUROLOGIC: Cranial nerves II through XII are grossly intact.  MUSCULOSKELETAL:L KNEE BANDAGED  SKIN: No ulceration or induration present    LABS:                        10.3   10.5  )-----------( 137      ( 05 Apr 2017 09:02 )             30.3       04-04    135  |  97<L>  |  22.0<H>  ----------------------------<  133<H>  4.2   |  28.0  |  0.89    Ca    8.7      04 Apr 2017 07:03  Mg     1.7     04-04 04-05 @ 09:02  hct 30.3 % hgb 10.3 g/dL    04-05 @ 09:02  plat 137 K/uL wbc 10.5 K/uL    04-04 @ 07:03  hct 31.7 % hgb 10.6 g/dL    04-04 @ 07:03  plat 145 K/uL wbc 8.2 K/uL    04-03 @ 07:15  hct 32.3 % hgb 11.0 g/dL    04-03 @ 07:15  plat 170 K/uL wbc 11.5 K/uL      04-04-17  creat 0.89 mg/dL gfr 94 mL/min/1.73M2 bun 22.0 mg/dL k 4.2 mmol/L  04-03-17  creat 1.00 mg/dL gfr 83 mL/min/1.73M2 bun 25.0 mg/dL k 4.3 mmol/L      MICROBIOLOGY:  Culture Results:   No growth (04-03 @ 19:51)  Culture Results:   Growth in anaerobic bottle: Staphylococcus aureus  Anaerobic Bottle: 1 day:06:04 Hours to positivity  Aerobic Bottle: No growth to date  .  TYPE: (C=Critical, N=Notification, A=Abnormal) C  TESTS:  _ BLD GS  DATE/TIME CALLED: _ 04/04/2017 09:02:44   (04-02 @ 23:28)  Culture Results:   No growth at 48 hours (04-02 @ 23:28)  Culture Results:   No growth at 48 hours (04-01 @ 08:35)  Culture Results:   No growth at 48 hours (04-01 @ 08:35)  Culture Results:   Rare Staphylococcus aureus (03-31 @ 13:05)  Culture Results:   No growth to date  Culture in progress (03-31 @ 13:05)  Culture Results:   Rare Staphylococcus aureus (03-31 @ 12:56)  Culture Results:   No growth to date  Culture in progress (03-31 @ 12:56)  Culture Results:   Rare Staphylococcus aureus (03-31 @ 12:43)  Culture Results:   No growth to date  Culture in progress (03-31 @ 12:43)  Culture Results:   Rare Staphylococcus aureus (03-31 @ 12:33)  Culture Results:   No growth to date  Culture in progress (03-31 @ 12:33)  Culture Results:   Growth in aerobic bottle: Staphylococcus aureus  Aerobic Bottle: 2 days and 08:23 Hours to positivity  Anaerobic Bottle: No growth at 5 days.  .  TYPE: (C=Critical, N=Notification, A=Abnormal) C  TESTS:  _ GS: Bld Culture  DATE/TIME CALLED: _ 04/02/ (03-31 @ 09:02)  Culture Results:   No growth at 5 days. (03-30 @ 11:18)        RADIOLOGY & ADDITIONAL STUDIES:          ELENA DE LEON MD FACP

## 2017-04-05 NOTE — CONSULT NOTE ADULT - ASSESSMENT
CT scan demonstrates a very small region in posterior lobe of prostate that may be an abscess.  Pt has had previous prostatic biopsies and sonogram for a lesion (nodule in prostate),  This region could only be drained via a transrectal approach using sonogram guidance in Radiology.  Spoke with radiologist who could arrange for sonogram and rectal probe if drainage or attempt at drainage is to be performed.

## 2017-04-05 NOTE — PROGRESS NOTE ADULT - PROBLEM SELECTOR PLAN 2
Blood culture 3/29, 3/31, 4/2 - MSSA  Blood culture 4/5 - pending  TTE and MICAELA - no evidence of vegetation  C/w ancef

## 2017-04-05 NOTE — PROGRESS NOTE ADULT - SUBJECTIVE AND OBJECTIVE BOX
MAGED READ    [id number]    81y      Male    INTERVAL HPI/OVERNIGHT EVENTS: No events on. No complaints today.    Hospital course:  82 yo M with h/o DM2, HTN, alcoholic cirrhosis presents from PMD office with +MSSA left knee arthrocentesis. Blood culture 3/29 was positive for MSSA. Doppler study was negative for DVT. 3/31 had I&D of left knee with MARKUS drain. TTE showed EF 60-65%. MICAELA negative for vegetations. MARKUS drain removed. Continuing to have positive blood cultures.     REVIEW OF SYSTEMS:    CONSTITUTIONAL: No fever   RESPIRATORY:  No shortness of breath  CARDIOVASCULAR: No chest pain, palpitations  GASTROINTESTINAL: No abdominal pain. No nausea, vomiting  NEUROLOGICAL: No headaches       Vital Signs Last 24 Hrs  T(C): 37.4, Max: 37.4 (- @ 23:20)  T(F): 99.4, Max: 99.4 ( @ 23:20)  HR: 79 (79 - 81)  BP: 120/56 (120/56 - 167/63)  BP(mean): --  RR: 18 (17 - 18)  SpO2: 97% (93% - 98%)    PHYSICAL EXAM:    GENERAL: NAD, nontoxic appearing   HEENT: PERRL, +EOMI  NECK: soft, Supple, No JVD,   CHEST/LUNG: Clear to percussion bilaterally; No wheezing  HEART: S1S2+, Regular rate and rhythm   ABDOMEN: Soft, Nontender, Nondistended; Bowel sounds present  EXTREMITIES:  decreasing erythema of left leg  SKIN: warm and dry  NEURO: AAOX3   PSYCH: calm      LABS:                        10.6   8.2   )-----------( 145      ( 2017 07:03 )             31.7     04-    135  |  97<L>  |  22.0<H>  ----------------------------<  133<H>  4.2   |  28.0  |  0.89    Ca    8.7      2017 07:03  Mg     1.7     04-04        Urinalysis Basic - ( 2017 19:52 )    Color: Yellow / Appearance: Clear / S.010 / pH: x  Gluc: x / Ketone: Trace  / Bili: Negative / Urobili: Negative mg/dL   Blood: x / Protein: 15 mg/dL / Nitrite: Negative   Leuk Esterase: Trace / RBC: 25-50 /HPF / WBC 0-2   Sq Epi: x / Non Sq Epi: Occasional / Bacteria: x          MEDICATIONS  (STANDING):  ceFAZolin   IVPB 2000milliGRAM(s) IV Intermittent <User Schedule>  enoxaparin Injectable 30milliGRAM(s) SubCutaneous every 12 hours  insulin lispro (HumaLOG) corrective regimen sliding scale  SubCutaneous three times a day before meals  dextrose 50% Injectable 12.5Gram(s) IV Push once  dextrose 50% Injectable 25Gram(s) IV Push once  dextrose 50% Injectable 25Gram(s) IV Push once  folic acid 1milliGRAM(s) Oral daily  multivitamin 1Tablet(s) Oral daily  lisinopril 40milliGRAM(s) Oral daily  tamsulosin 0.4milliGRAM(s) Oral at bedtime    MEDICATIONS  (PRN):  dextrose Gel 1Dose(s) Oral once PRN Blood Glucose LESS THAN 70 milliGRAM(s)/deciliter  glucagon  Injectable 1milliGRAM(s) IntraMuscular once PRN Glucose LESS THAN 70 milligrams/deciliter  oxyCODONE IR 10milliGRAM(s) Oral every 3 hours PRN Moderate Pain  oxyCODONE IR 5milliGRAM(s) Oral every 3 hours PRN Mild Pain  ondansetron Injectable 4milliGRAM(s) IV Push every 6 hours PRN Nausea and/or Vomiting  HYDROmorphone  Injectable 0.5milliGRAM(s) IV Push every 4 hours PRN severe pain/breakthrough  LORazepam   Injectable 2milliGRAM(s) IV Push every 1 hour PRN Symptom-triggered: each CIWA -Ar score 8 or GREATER      RADIOLOGY & ADDITIONAL TESTS:

## 2017-04-06 DIAGNOSIS — N41.2 ABSCESS OF PROSTATE: ICD-10-CM

## 2017-04-06 DIAGNOSIS — A04.7 ENTEROCOLITIS DUE TO CLOSTRIDIUM DIFFICILE: ICD-10-CM

## 2017-04-06 LAB
ANION GAP SERPL CALC-SCNC: 9 MMOL/L — SIGNIFICANT CHANGE UP (ref 5–17)
BUN SERPL-MCNC: 16 MG/DL — SIGNIFICANT CHANGE UP (ref 8–20)
CALCIUM SERPL-MCNC: 8.1 MG/DL — LOW (ref 8.6–10.2)
CHLORIDE SERPL-SCNC: 101 MMOL/L — SIGNIFICANT CHANGE UP (ref 98–107)
CO2 SERPL-SCNC: 25 MMOL/L — SIGNIFICANT CHANGE UP (ref 22–29)
CREAT SERPL-MCNC: 0.79 MG/DL — SIGNIFICANT CHANGE UP (ref 0.5–1.3)
CULTURE RESULTS: SIGNIFICANT CHANGE UP
CULTURE RESULTS: SIGNIFICANT CHANGE UP
GLUCOSE SERPL-MCNC: 116 MG/DL — HIGH (ref 70–115)
HCT VFR BLD CALC: 29.9 % — LOW (ref 42–52)
HGB BLD-MCNC: 10.2 G/DL — LOW (ref 14–18)
MAGNESIUM SERPL-MCNC: 1.6 MG/DL — LOW (ref 1.8–2.5)
MCHC RBC-ENTMCNC: 33.9 PG — HIGH (ref 27–31)
MCHC RBC-ENTMCNC: 34.1 G/DL — SIGNIFICANT CHANGE UP (ref 32–36)
MCV RBC AUTO: 99.3 FL — HIGH (ref 80–94)
PLATELET # BLD AUTO: 149 K/UL — LOW (ref 150–400)
POTASSIUM SERPL-MCNC: 4 MMOL/L — SIGNIFICANT CHANGE UP (ref 3.5–5.3)
POTASSIUM SERPL-SCNC: 4 MMOL/L — SIGNIFICANT CHANGE UP (ref 3.5–5.3)
PSA FREE FLD-MCNC: 19.1 % — LOW
PSA FREE FLD-MCNC: 2.39 NG/ML — SIGNIFICANT CHANGE UP
PSA FREE MFR FLD: 12.52 NG/ML — HIGH (ref 0–4)
RBC # BLD: 3.01 M/UL — LOW (ref 4.6–6.2)
RBC # FLD: 13.5 % — SIGNIFICANT CHANGE UP (ref 11–15.6)
SODIUM SERPL-SCNC: 135 MMOL/L — SIGNIFICANT CHANGE UP (ref 135–145)
SPECIMEN SOURCE: SIGNIFICANT CHANGE UP
SPECIMEN SOURCE: SIGNIFICANT CHANGE UP
WBC # BLD: 9.6 K/UL — SIGNIFICANT CHANGE UP (ref 4.8–10.8)
WBC # FLD AUTO: 9.6 K/UL — SIGNIFICANT CHANGE UP (ref 4.8–10.8)

## 2017-04-06 PROCEDURE — 99233 SBSQ HOSP IP/OBS HIGH 50: CPT

## 2017-04-06 RX ADMIN — TAMSULOSIN HYDROCHLORIDE 0.4 MILLIGRAM(S): 0.4 CAPSULE ORAL at 21:44

## 2017-04-06 RX ADMIN — LISINOPRIL 40 MILLIGRAM(S): 2.5 TABLET ORAL at 06:38

## 2017-04-06 RX ADMIN — HYDROMORPHONE HYDROCHLORIDE 0.5 MILLIGRAM(S): 2 INJECTION INTRAMUSCULAR; INTRAVENOUS; SUBCUTANEOUS at 19:57

## 2017-04-06 RX ADMIN — ENOXAPARIN SODIUM 30 MILLIGRAM(S): 100 INJECTION SUBCUTANEOUS at 17:37

## 2017-04-06 RX ADMIN — Medication 125 MILLIGRAM(S): at 06:38

## 2017-04-06 RX ADMIN — OXYCODONE HYDROCHLORIDE 10 MILLIGRAM(S): 5 TABLET ORAL at 00:14

## 2017-04-06 RX ADMIN — OXYCODONE HYDROCHLORIDE 10 MILLIGRAM(S): 5 TABLET ORAL at 21:44

## 2017-04-06 RX ADMIN — Medication 125 MILLIGRAM(S): at 00:14

## 2017-04-06 RX ADMIN — ENOXAPARIN SODIUM 30 MILLIGRAM(S): 100 INJECTION SUBCUTANEOUS at 06:37

## 2017-04-06 RX ADMIN — HYDROMORPHONE HYDROCHLORIDE 0.5 MILLIGRAM(S): 2 INJECTION INTRAMUSCULAR; INTRAVENOUS; SUBCUTANEOUS at 06:53

## 2017-04-06 RX ADMIN — Medication 125 MILLIGRAM(S): at 23:50

## 2017-04-06 RX ADMIN — Medication 100 MILLIGRAM(S): at 06:37

## 2017-04-06 RX ADMIN — SODIUM CHLORIDE 100 MILLILITER(S): 9 INJECTION INTRAMUSCULAR; INTRAVENOUS; SUBCUTANEOUS at 19:43

## 2017-04-06 RX ADMIN — SODIUM CHLORIDE 100 MILLILITER(S): 9 INJECTION INTRAMUSCULAR; INTRAVENOUS; SUBCUTANEOUS at 13:07

## 2017-04-06 RX ADMIN — Medication 1 MILLIGRAM(S): at 13:06

## 2017-04-06 RX ADMIN — Medication 100 MILLIGRAM(S): at 19:46

## 2017-04-06 RX ADMIN — OXYCODONE HYDROCHLORIDE 10 MILLIGRAM(S): 5 TABLET ORAL at 13:06

## 2017-04-06 RX ADMIN — OXYCODONE HYDROCHLORIDE 10 MILLIGRAM(S): 5 TABLET ORAL at 22:16

## 2017-04-06 RX ADMIN — OXYCODONE HYDROCHLORIDE 10 MILLIGRAM(S): 5 TABLET ORAL at 17:37

## 2017-04-06 RX ADMIN — OXYCODONE HYDROCHLORIDE 10 MILLIGRAM(S): 5 TABLET ORAL at 08:26

## 2017-04-06 RX ADMIN — OXYCODONE HYDROCHLORIDE 10 MILLIGRAM(S): 5 TABLET ORAL at 13:26

## 2017-04-06 RX ADMIN — Medication 1 TABLET(S): at 13:06

## 2017-04-06 RX ADMIN — OXYCODONE HYDROCHLORIDE 10 MILLIGRAM(S): 5 TABLET ORAL at 00:54

## 2017-04-06 RX ADMIN — Medication 125 MILLIGRAM(S): at 13:07

## 2017-04-06 RX ADMIN — HYDROMORPHONE HYDROCHLORIDE 0.5 MILLIGRAM(S): 2 INJECTION INTRAMUSCULAR; INTRAVENOUS; SUBCUTANEOUS at 06:38

## 2017-04-06 RX ADMIN — OXYCODONE HYDROCHLORIDE 10 MILLIGRAM(S): 5 TABLET ORAL at 18:07

## 2017-04-06 RX ADMIN — Medication 125 MILLIGRAM(S): at 17:37

## 2017-04-06 RX ADMIN — OXYCODONE HYDROCHLORIDE 10 MILLIGRAM(S): 5 TABLET ORAL at 08:56

## 2017-04-06 RX ADMIN — HYDROMORPHONE HYDROCHLORIDE 0.5 MILLIGRAM(S): 2 INJECTION INTRAMUSCULAR; INTRAVENOUS; SUBCUTANEOUS at 19:42

## 2017-04-06 RX ADMIN — Medication 100 MILLIGRAM(S): at 13:06

## 2017-04-06 NOTE — PROGRESS NOTE ADULT - PROBLEM SELECTOR PLAN 2
As per CT scan - fluid collection in left prostate 2.7 x 2cm.  Physical exam - nontender, no fluctuance  Has h/o multiple prostate biopsies in past  As per urology, unclear if this is an abscess given lack of symptoms and exam findings. Now awaiting PSA and urology to determine for need transrectal biopsy/drainage.  D/w Dr. Pratt, no IR intervention at this time and defer to urology.

## 2017-04-06 NOTE — PROGRESS NOTE ADULT - SUBJECTIVE AND OBJECTIVE BOX
INTERVAL HPI/OVERNIGHT EVENTS: Patient with complaint of difficulty voiding.  Denies dysuria.    MEDICATIONS  (STANDING):  ceFAZolin   IVPB 2000milliGRAM(s) IV Intermittent <User Schedule>  enoxaparin Injectable 30milliGRAM(s) SubCutaneous every 12 hours  insulin lispro (HumaLOG) corrective regimen sliding scale  SubCutaneous three times a day before meals  dextrose 50% Injectable 12.5Gram(s) IV Push once  dextrose 50% Injectable 25Gram(s) IV Push once  dextrose 50% Injectable 25Gram(s) IV Push once  folic acid 1milliGRAM(s) Oral daily  multivitamin 1Tablet(s) Oral daily  lisinopril 40milliGRAM(s) Oral daily  tamsulosin 0.4milliGRAM(s) Oral at bedtime  sodium chloride 0.9%. 1000milliLiter(s) IV Continuous <Continuous>  vancomycin    Solution 125milliGRAM(s) Oral every 6 hours    MEDICATIONS  (PRN):  dextrose Gel 1Dose(s) Oral once PRN Blood Glucose LESS THAN 70 milliGRAM(s)/deciliter  glucagon  Injectable 1milliGRAM(s) IntraMuscular once PRN Glucose LESS THAN 70 milligrams/deciliter  oxyCODONE IR 10milliGRAM(s) Oral every 3 hours PRN Moderate Pain  oxyCODONE IR 5milliGRAM(s) Oral every 3 hours PRN Mild Pain  ondansetron Injectable 4milliGRAM(s) IV Push every 6 hours PRN Nausea and/or Vomiting  HYDROmorphone  Injectable 0.5milliGRAM(s) IV Push every 4 hours PRN severe pain/breakthrough      Allergies    No Known Allergies    Intolerances        Vital Signs Last 24 Hrs  T(C): 36.9, Max: 37.3 (04-05 @ 16:18)  T(F): 98.4, Max: 99.1 (04-05 @ 16:18)  HR: 70 (70 - 80)  BP: 137/63 (137/63 - 166/71)  BP(mean): --  RR: 18 (18 - 18)  SpO2: 97% (95% - 98%)    ROS:  Constitutional: No fever, weight loss or fatigue  Respiratory: No cough, wheezing, chills or hemoptysis  Cardiovascular: No chest pain, palpitations, shortness of breath, dizziness or leg swelling  Genitourinary: No dysuria, frequency, hematuria or incontinence  Skin: No itching, burning, rashes or lesions   Musculoskeletal: No joint pain or swelling; No muscle, back or extremity pain  Psychiatric: No depression, anxiety, mood swings or difficulty sleeping  Allergy and Immunologic: No hives or eczema     ON PE:  General: Well developed; well nourished; in no acute distress  Head: Normocephalic; atraumatic  Respiratory: No tachypnea  Gastrointestinal: Soft non-tender non-distended;  Genitourinary: No costovertebral angle tenderness.  Urinary bladder is mildly distended  Extremities: Normal range of motion, No edema  Neurological: Alert and oriented x4  Skin: Warm and dry. No acute rash  Psychiatric: Cooperative and appropriate      LABS:                        10.2   9.6   )-----------( 149      ( 06 Apr 2017 06:56 )             29.9     04-06    135  |  101  |  16.0  ----------------------------<  116<H>  4.0   |  25.0  |  0.79    Ca    8.1<L>      06 Apr 2017 06:56  Mg     1.6     04-06            RADIOLOGY & ADDITIONAL TESTS:

## 2017-04-06 NOTE — PROGRESS NOTE ADULT - SUBJECTIVE AND OBJECTIVE BOX
MAGED READ    [id number]    81y      Male    INTERVAL HPI/OVERNIGHT EVENTS: Continuing to have multiple episodes of diarrhea overnight. C. diff positive.     Hospital course:  82 yo M with h/o DM2, HTN, alcoholic cirrhosis, basal cell carcinoma s/p Moh's presents from PMD office with +MSSA left knee arthrocentesis. Blood culture 3/29 was positive for MSSA. Doppler study was negative for DVT. 3/31 had I&D of left knee with MARKUS drain. TTE showed EF 60-65%. MICAELA negative for vegetations. MARKUS drain removed. Continuing to have positive blood cultures. CT abd/pelvis revealed acute colitis, and fluid collection in the left prostate peripheral zone, measuring 2.7 x 2.0 cm. As per IR, urology will need to arrange for transrectal biopsy. Pt with numerous episodes of diarrhea - now c. diff positive.       REVIEW OF SYSTEMS:    CONSTITUTIONAL: No fever   RESPIRATORY: No cough; No shortness of breath  CARDIOVASCULAR: No chest pain, palpitations  GASTROINTESTINAL: +mild abd. pain  NEUROLOGICAL: No headaches       Vital Signs Last 24 Hrs  T(C): 36.9, Max: 37.3 (04-05 @ 16:18)  T(F): 98.4, Max: 99.1 (04-05 @ 16:18)  HR: 70 (70 - 80)  BP: 137/63 (137/63 - 166/71)  BP(mean): --  RR: 18 (18 - 18)  SpO2: 97% (95% - 98%)    PHYSICAL EXAM:    GENERAL: NAD, nontoxic appearing   HEENT: PERRL, +EOMI, MMM  NECK: soft, Supple, No JVD,   CHEST/LUNG: Clear to percussion bilaterally; No wheezing  HEART: S1S2+, Regular rate and rhythm; No murmurs, rubs, or gallops  ABDOMEN: Soft, Nontender, Nondistended; Bowel sounds present  EXTREMITIES: improving edema  SKIN: No rashes or lesions  NEURO: AAOX3, no focal deficits, no motor r sensory loss  PSYCH: normal mood      LABS:                        10.2   9.6   )-----------( 149      ( 06 Apr 2017 06:56 )             29.9     04-06    135  |  101  |  16.0  ----------------------------<  116<H>  4.0   |  25.0  |  0.79    Ca    8.1<L>      06 Apr 2017 06:56  Mg     1.6     04-06              MEDICATIONS  (STANDING):  ceFAZolin   IVPB 2000milliGRAM(s) IV Intermittent <User Schedule>  enoxaparin Injectable 30milliGRAM(s) SubCutaneous every 12 hours  insulin lispro (HumaLOG) corrective regimen sliding scale  SubCutaneous three times a day before meals  dextrose 50% Injectable 12.5Gram(s) IV Push once  dextrose 50% Injectable 25Gram(s) IV Push once  dextrose 50% Injectable 25Gram(s) IV Push once  folic acid 1milliGRAM(s) Oral daily  multivitamin 1Tablet(s) Oral daily  lisinopril 40milliGRAM(s) Oral daily  tamsulosin 0.4milliGRAM(s) Oral at bedtime  sodium chloride 0.9%. 1000milliLiter(s) IV Continuous <Continuous>  vancomycin    Solution 125milliGRAM(s) Oral every 6 hours    MEDICATIONS  (PRN):  dextrose Gel 1Dose(s) Oral once PRN Blood Glucose LESS THAN 70 milliGRAM(s)/deciliter  glucagon  Injectable 1milliGRAM(s) IntraMuscular once PRN Glucose LESS THAN 70 milligrams/deciliter  oxyCODONE IR 10milliGRAM(s) Oral every 3 hours PRN Moderate Pain  oxyCODONE IR 5milliGRAM(s) Oral every 3 hours PRN Mild Pain  ondansetron Injectable 4milliGRAM(s) IV Push every 6 hours PRN Nausea and/or Vomiting  HYDROmorphone  Injectable 0.5milliGRAM(s) IV Push every 4 hours PRN severe pain/breakthrough      RADIOLOGY & ADDITIONAL TESTS:

## 2017-04-06 NOTE — PROGRESS NOTE ADULT - SUBJECTIVE AND OBJECTIVE BOX
[facility] [place] QUAN DE LEON MD FACP   MAGED RAYA MD  Diplomates American Board of Internal Medicine and Infectious Diseases      MAGED NOBLE  MRN-[id number]  81y    INTERVAL HPI/OVERNIGHT EVENTS:    new cdiff on po vanco  low grade fevers  f/up blood c/s pending  ct with prostatic abscess for drainage      Vital Signs Last 24 Hrs  T(C): 36.9, Max: 37.4 (04-05 @ 08:55)  T(F): 98.4, Max: 99.4 (04-05 @ 08:55)  HR: 70 (70 - 80)  BP: 137/63 (120/56 - 166/71)  BP(mean): --  RR: 18 (18 - 18)  SpO2: 97% (95% - 98%)    ANTIBIOTICS  ceFAZolin   IVPB 2000milliGRAM(s) IV Intermittent <User Schedule>  vancomycin    Solution 125milliGRAM(s) Oral every 6 hours      Allergies    No Known Allergies    Intolerances        REVIEW OF SYSTEMS:    PHYSICAL EXAM:  Vital Signs Last 24 Hrs  T(C): 36.9, Max: 37.4 (04-05 @ 08:55)  T(F): 98.4, Max: 99.4 (04-05 @ 08:55)  HR: 70 (70 - 80)  BP: 137/63 (120/56 - 166/71)  BP(mean): --  RR: 18 (18 - 18)  SpO2: 97% (95% - 98%)      GEN: NAD, pleasant  HEENT: normocephalic and atraumatic. EOMI. JASON. Moist mucosa. Clear Posterior pharynx.  NECK: Supple. No carotid bruits.  No lymphadenopathy or thyromegaly.  LUNGS: Clear to auscultation.  HEART: Regular rate and rhythm without murmur.  ABDOMEN: Soft, nontender, and nondistended.  Positive bowel sounds.  No hepatosplenomegaly was noted.  EXTREMITIES: Without any cyanosis, clubbing, rash, lesions or edema.  NEUROLOGIC: Cranial nerves II through XII are grossly intact.  MUSCULOSKELETAL:  SKIN: No ulceration or induration present    LABS:                        10.2   9.6   )-----------( 149      ( 06 Apr 2017 06:56 )             29.9       04-06    135  |  101  |  16.0  ----------------------------<  116<H>  4.0   |  25.0  |  0.79    Ca    8.1<L>      06 Apr 2017 06:56  Mg     1.6     04-06 04-06 @ 06:56  hct 29.9 % hgb 10.2 g/dL    04-06 @ 06:56  plat 149 K/uL wbc 9.6 K/uL    04-05 @ 09:02  hct 30.3 % hgb 10.3 g/dL    04-05 @ 09:02  plat 137 K/uL wbc 10.5 K/uL    04-04 @ 07:03  hct 31.7 % hgb 10.6 g/dL    04-04 @ 07:03  plat 145 K/uL wbc 8.2 K/uL      04-06-17  creat 0.79 mg/dL gfr 99 mL/min/1.73M2 bun 16.0 mg/dL k 4.0 mmol/L  04-05-17  creat 0.84 mg/dL gfr 97 mL/min/1.73M2 bun 19.0 mg/dL k 3.9 mmol/L  04-04-17  creat 0.89 mg/dL gfr 94 mL/min/1.73M2 bun 22.0 mg/dL k 4.2 mmol/L      MICROBIOLOGY:  Culture Results:   No growth (04-03 @ 19:51)  Culture Results:   Growth in anaerobic bottle: Staphylococcus aureus  Anaerobic Bottle: 1 day:06:04 Hours to positivity  Aerobic Bottle: No growth to date  .  TYPE: (C=Critical, N=Notification, A=Abnormal) C  TESTS:  _ BLD GS  DATE/TIME CALLED: _ 04/04/2017 09:02:44   (04-02 @ 23:28)  Culture Results:   No growth at 48 hours (04-02 @ 23:28)  Culture Results:   No growth at 48 hours (04-01 @ 08:35)  Culture Results:   No growth at 48 hours (04-01 @ 08:35)  Culture Results:   Rare Staphylococcus aureus (03-31 @ 13:05)  Culture Results:   No growth to date  Culture in progress (03-31 @ 13:05)  Culture Results:   Rare Staphylococcus aureus (03-31 @ 12:56)  Culture Results:   No growth to date  Culture in progress (03-31 @ 12:56)  Culture Results:   Rare Staphylococcus aureus (03-31 @ 12:43)  Culture Results:   No growth to date  Culture in progress (03-31 @ 12:43)  Culture Results:   Rare Staphylococcus aureus (03-31 @ 12:33)  Culture Results:   No growth to date  Culture in progress (03-31 @ 12:33)  Culture Results:   Growth in aerobic bottle: Staphylococcus aureus  Aerobic Bottle: 2 days and 08:23 Hours to positivity  Anaerobic Bottle: No growth at 5 days.  .  TYPE: (C=Critical, N=Notification, A=Abnormal) C  TESTS:  _ GS: Bld Culture  DATE/TIME CALLED: _ 04/02/ (03-31 @ 09:02)        RADIOLOGY & ADDITIONAL STUDIES:          ELENA DE LEON MD FACP

## 2017-04-07 LAB
ANION GAP SERPL CALC-SCNC: 8 MMOL/L — SIGNIFICANT CHANGE UP (ref 5–17)
BUN SERPL-MCNC: 15 MG/DL — SIGNIFICANT CHANGE UP (ref 8–20)
CALCIUM SERPL-MCNC: 8 MG/DL — LOW (ref 8.6–10.2)
CHLORIDE SERPL-SCNC: 100 MMOL/L — SIGNIFICANT CHANGE UP (ref 98–107)
CO2 SERPL-SCNC: 25 MMOL/L — SIGNIFICANT CHANGE UP (ref 22–29)
CREAT SERPL-MCNC: 0.78 MG/DL — SIGNIFICANT CHANGE UP (ref 0.5–1.3)
GLUCOSE SERPL-MCNC: 97 MG/DL — SIGNIFICANT CHANGE UP (ref 70–115)
HCT VFR BLD CALC: 30.1 % — LOW (ref 42–52)
HGB BLD-MCNC: 10.2 G/DL — LOW (ref 14–18)
LDH SERPL L TO P-CCNC: 205 U/L — HIGH (ref 98–192)
MAGNESIUM SERPL-MCNC: 1.5 MG/DL — LOW (ref 1.8–2.5)
MCHC RBC-ENTMCNC: 33.9 G/DL — SIGNIFICANT CHANGE UP (ref 32–36)
MCHC RBC-ENTMCNC: 34 PG — HIGH (ref 27–31)
MCV RBC AUTO: 100.3 FL — HIGH (ref 80–94)
PLATELET # BLD AUTO: 137 K/UL — LOW (ref 150–400)
POTASSIUM SERPL-MCNC: 3.8 MMOL/L — SIGNIFICANT CHANGE UP (ref 3.5–5.3)
POTASSIUM SERPL-SCNC: 3.8 MMOL/L — SIGNIFICANT CHANGE UP (ref 3.5–5.3)
RBC # BLD: 3 M/UL — LOW (ref 4.6–6.2)
RBC # FLD: 13.2 % — SIGNIFICANT CHANGE UP (ref 11–15.6)
SODIUM SERPL-SCNC: 133 MMOL/L — LOW (ref 135–145)
WBC # BLD: 10.3 K/UL — SIGNIFICANT CHANGE UP (ref 4.8–10.8)
WBC # FLD AUTO: 10.3 K/UL — SIGNIFICANT CHANGE UP (ref 4.8–10.8)

## 2017-04-07 PROCEDURE — 72148 MRI LUMBAR SPINE W/O DYE: CPT | Mod: 26

## 2017-04-07 PROCEDURE — 72157 MRI CHEST SPINE W/O & W/DYE: CPT | Mod: 26

## 2017-04-07 PROCEDURE — 99233 SBSQ HOSP IP/OBS HIGH 50: CPT

## 2017-04-07 RX ORDER — ALPRAZOLAM 0.25 MG
1 TABLET ORAL ONCE
Qty: 0 | Refills: 0 | Status: DISCONTINUED | OUTPATIENT
Start: 2017-04-07 | End: 2017-04-07

## 2017-04-07 RX ORDER — MAGNESIUM SULFATE 500 MG/ML
2 VIAL (ML) INJECTION ONCE
Qty: 0 | Refills: 0 | Status: COMPLETED | OUTPATIENT
Start: 2017-04-07 | End: 2017-04-07

## 2017-04-07 RX ADMIN — HYDROMORPHONE HYDROCHLORIDE 0.5 MILLIGRAM(S): 2 INJECTION INTRAMUSCULAR; INTRAVENOUS; SUBCUTANEOUS at 12:25

## 2017-04-07 RX ADMIN — Medication 50 GRAM(S): at 12:24

## 2017-04-07 RX ADMIN — TAMSULOSIN HYDROCHLORIDE 0.4 MILLIGRAM(S): 0.4 CAPSULE ORAL at 20:53

## 2017-04-07 RX ADMIN — HYDROMORPHONE HYDROCHLORIDE 0.5 MILLIGRAM(S): 2 INJECTION INTRAMUSCULAR; INTRAVENOUS; SUBCUTANEOUS at 05:39

## 2017-04-07 RX ADMIN — Medication 100 MILLIGRAM(S): at 05:38

## 2017-04-07 RX ADMIN — Medication 1 MILLIGRAM(S): at 12:22

## 2017-04-07 RX ADMIN — HYDROMORPHONE HYDROCHLORIDE 0.5 MILLIGRAM(S): 2 INJECTION INTRAMUSCULAR; INTRAVENOUS; SUBCUTANEOUS at 05:54

## 2017-04-07 RX ADMIN — LISINOPRIL 40 MILLIGRAM(S): 2.5 TABLET ORAL at 05:38

## 2017-04-07 RX ADMIN — Medication 125 MILLIGRAM(S): at 12:23

## 2017-04-07 RX ADMIN — Medication 100 MILLIGRAM(S): at 20:52

## 2017-04-07 RX ADMIN — OXYCODONE HYDROCHLORIDE 10 MILLIGRAM(S): 5 TABLET ORAL at 16:03

## 2017-04-07 RX ADMIN — Medication 1 MILLIGRAM(S): at 12:45

## 2017-04-07 RX ADMIN — Medication 1 TABLET(S): at 12:22

## 2017-04-07 RX ADMIN — Medication 125 MILLIGRAM(S): at 05:38

## 2017-04-07 RX ADMIN — Medication 125 MILLIGRAM(S): at 17:06

## 2017-04-07 RX ADMIN — ENOXAPARIN SODIUM 30 MILLIGRAM(S): 100 INJECTION SUBCUTANEOUS at 05:38

## 2017-04-07 RX ADMIN — OXYCODONE HYDROCHLORIDE 10 MILLIGRAM(S): 5 TABLET ORAL at 23:30

## 2017-04-07 RX ADMIN — HYDROMORPHONE HYDROCHLORIDE 0.5 MILLIGRAM(S): 2 INJECTION INTRAMUSCULAR; INTRAVENOUS; SUBCUTANEOUS at 13:25

## 2017-04-07 RX ADMIN — Medication 100 MILLIGRAM(S): at 12:23

## 2017-04-07 RX ADMIN — OXYCODONE HYDROCHLORIDE 10 MILLIGRAM(S): 5 TABLET ORAL at 22:41

## 2017-04-07 RX ADMIN — Medication 125 MILLIGRAM(S): at 23:56

## 2017-04-07 RX ADMIN — OXYCODONE HYDROCHLORIDE 10 MILLIGRAM(S): 5 TABLET ORAL at 17:06

## 2017-04-07 RX ADMIN — ENOXAPARIN SODIUM 30 MILLIGRAM(S): 100 INJECTION SUBCUTANEOUS at 17:06

## 2017-04-07 NOTE — PROGRESS NOTE ADULT - PROBLEM SELECTOR PLAN 2
As per CT scan - fluid collection in left prostate 2.7 x 2cm.  Physical exam - nontender, no fluctuance  Has h/o multiple prostate biopsies in past  As per urology, unclear if this is an abscess given lack of symptoms and exam findings. PSA elevated. Awaiting urology input regarding further evaluation.   D/w Dr. Pratt, no IR intervention at this time and defer to urology.

## 2017-04-07 NOTE — PROGRESS NOTE ADULT - SUBJECTIVE AND OBJECTIVE BOX
Pt Name: MAGED NOBLE    MRN: [id number]      Patient is a being followed for a septic left knee I&D on 3/31/17 POD #6. + improved pain and improved AROM of the left knee. He is being followed for possible prostate abscess.       PAST MEDICAL & SURGICAL HISTORY:  PAST MEDICAL & SURGICAL HISTORY:  DM (diabetes mellitus)  No significant past surgical history      Allergies: No Known Allergies      Medications: ceFAZolin   IVPB 2000milliGRAM(s) IV Intermittent <User Schedule>  enoxaparin Injectable 30milliGRAM(s) SubCutaneous every 12 hours  insulin lispro (HumaLOG) corrective regimen sliding scale  SubCutaneous three times a day before meals  dextrose Gel 1Dose(s) Oral once PRN  dextrose 50% Injectable 12.5Gram(s) IV Push once  dextrose 50% Injectable 25Gram(s) IV Push once  dextrose 50% Injectable 25Gram(s) IV Push once  glucagon  Injectable 1milliGRAM(s) IntraMuscular once PRN  oxyCODONE IR 10milliGRAM(s) Oral every 3 hours PRN  oxyCODONE IR 5milliGRAM(s) Oral every 3 hours PRN  ondansetron Injectable 4milliGRAM(s) IV Push every 6 hours PRN  HYDROmorphone  Injectable 0.5milliGRAM(s) IV Push every 4 hours PRN  folic acid 1milliGRAM(s) Oral daily  multivitamin 1Tablet(s) Oral daily  lisinopril 40milliGRAM(s) Oral daily  tamsulosin 0.4milliGRAM(s) Oral at bedtime  sodium chloride 0.9%. 1000milliLiter(s) IV Continuous <Continuous>  vancomycin    Solution 125milliGRAM(s) Oral every 6 hours        Ambulation: Walking independently [ ] With Cane [ ] With Walker [ ]  Bedbound [ ]                           10.2   9.6   )-----------( 149      ( 06 Apr 2017 06:56 )             29.9     04-06    135  |  101  |  16.0  ----------------------------<  116<H>  4.0   |  25.0  |  0.79    Ca    8.1<L>      06 Apr 2017 06:56  Mg     1.6     04-06        PHYSICAL EXAM:    Vital Signs Last 24 Hrs  T(C): 36.8, Max: 37.1 (04-06 @ 16:44)  T(F): 98.2, Max: 98.7 (04-06 @ 16:44)  HR: 80 (70 - 80)  BP: 127/62 (127/62 - 144/68)  BP(mean): --  RR: 18 (18 - 18)  SpO2: 96% (96% - 97%)  Daily     Daily     Appearance: Alert, responsive, in no acute distress.    Neurological: Sensation is grossly intact to light touch. 5/5 motor function of all extremities. No focal deficits or weaknesses found.    Skin: LEFT knee:  no rash on visible skin. Dressing from 4/6/2017 is clean, dry and intact. No bleeding. No abrasions. No ulcerations. No discharge.    Vascular: 2+ distal pulses. Cap refill < 2 sec. No signs of venous insuffiency or stasis. No extremity ulcerations. No cyanosis.    Musculoskeletal:         Left Lower Extremity: + improved A/PROM of the left knee. No calf tenderness. No bony tenderness.         A/P:  Pt is a  81y Male s/p I&D of a left septic knee    PLAN:   * WBAT with PT  * ABX per ID  * office follow-up in 1-2 weeks from discharge  * DVTP

## 2017-04-07 NOTE — PROGRESS NOTE ADULT - SUBJECTIVE AND OBJECTIVE BOX
MAGED READ    [id number]    81y      Male    INTERVAL HPI/OVERNIGHT EVENTS: No events on. Still with copious amount of diarrhea.    Hospital course:  80 yo M with h/o DM2, HTN, alcoholic cirrhosis, basal cell carcinoma s/p Moh's presents from PMD office with +MSSA left knee arthrocentesis. Blood culture 3/29 was positive for MSSA. Doppler study was negative for DVT. 3/31 had I&D of left knee with MARKUS drain. TTE showed EF 60-65%. MICAELA negative for vegetations. MARKUS drain removed. Continuing to have positive blood cultures. CT abd/pelvis revealed acute colitis, and fluid collection in the left prostate peripheral zone, measuring 2.7 x 2.0 cm. As per IR, urology will need to arrange for transrectal biopsy. Pt with numerous episodes of diarrhea - now c. diff positive.    REVIEW OF SYSTEMS:    CONSTITUTIONAL: No fever   RESPIRATORY: No cough,No shortness of breath  CARDIOVASCULAR: No chest pain, palpitations  GASTROINTESTINAL: No abdominal pain. No nausea, vomiting  NEUROLOGICAL: No headaches    Vital Signs Last 24 Hrs  T(C): 37, Max: 37.1 (04-06 @ 16:44)  T(F): 98.6, Max: 98.7 (04-06 @ 16:44)  HR: 83 (79 - 83)  BP: 159/66 (127/62 - 159/66)  BP(mean): --  RR: 18 (18 - 18)  SpO2: 96% (96% - 96%)    PHYSICAL EXAM:    GENERAL: NAD, nontoxic appearring  HEENT: PERRL, +EOMI  NECK: soft, Supple, No JVD,   CHEST/LUNG: Clear to percussion bilaterally; No wheezing  HEART: S1S2+, Regular rate and rhythm; +3/6 systolic murmur  ABDOMEN: Soft, Nontender, Nondistended; Bowel sounds present  EXTREMITIES:  improving edema and erythema of left leg  SKIN: warm and dry  NEURO: AAOX3   PSYCH: normal mood      LABS:                        10.2   9.6   )-----------( 149      ( 06 Apr 2017 06:56 )             29.9     04-06    135  |  101  |  16.0  ----------------------------<  116<H>  4.0   |  25.0  |  0.79    Ca    8.1<L>      06 Apr 2017 06:56  Mg     1.6     04-06              MEDICATIONS  (STANDING):  ceFAZolin   IVPB 2000milliGRAM(s) IV Intermittent <User Schedule>  enoxaparin Injectable 30milliGRAM(s) SubCutaneous every 12 hours  insulin lispro (HumaLOG) corrective regimen sliding scale  SubCutaneous three times a day before meals  dextrose 50% Injectable 12.5Gram(s) IV Push once  dextrose 50% Injectable 25Gram(s) IV Push once  dextrose 50% Injectable 25Gram(s) IV Push once  folic acid 1milliGRAM(s) Oral daily  multivitamin 1Tablet(s) Oral daily  lisinopril 40milliGRAM(s) Oral daily  tamsulosin 0.4milliGRAM(s) Oral at bedtime  sodium chloride 0.9%. 1000milliLiter(s) IV Continuous <Continuous>  vancomycin    Solution 125milliGRAM(s) Oral every 6 hours    MEDICATIONS  (PRN):  dextrose Gel 1Dose(s) Oral once PRN Blood Glucose LESS THAN 70 milliGRAM(s)/deciliter  glucagon  Injectable 1milliGRAM(s) IntraMuscular once PRN Glucose LESS THAN 70 milligrams/deciliter  oxyCODONE IR 10milliGRAM(s) Oral every 3 hours PRN Moderate Pain  oxyCODONE IR 5milliGRAM(s) Oral every 3 hours PRN Mild Pain  ondansetron Injectable 4milliGRAM(s) IV Push every 6 hours PRN Nausea and/or Vomiting  HYDROmorphone  Injectable 0.5milliGRAM(s) IV Push every 4 hours PRN severe pain/breakthrough      RADIOLOGY & ADDITIONAL TESTS:

## 2017-04-07 NOTE — PROGRESS NOTE ADULT - SUBJECTIVE AND OBJECTIVE BOX
INTERVAL HPI/OVERNIGHT EVENTS:  Feels well.  Nunez in place now.  Diarhea     MEDICATIONS  (STANDING):  ceFAZolin   IVPB 2000milliGRAM(s) IV Intermittent <User Schedule>  enoxaparin Injectable 30milliGRAM(s) SubCutaneous every 12 hours  insulin lispro (HumaLOG) corrective regimen sliding scale  SubCutaneous three times a day before meals  dextrose 50% Injectable 12.5Gram(s) IV Push once  dextrose 50% Injectable 25Gram(s) IV Push once  dextrose 50% Injectable 25Gram(s) IV Push once  folic acid 1milliGRAM(s) Oral daily  multivitamin 1Tablet(s) Oral daily  lisinopril 40milliGRAM(s) Oral daily  tamsulosin 0.4milliGRAM(s) Oral at bedtime  sodium chloride 0.9%. 1000milliLiter(s) IV Continuous <Continuous>  vancomycin    Solution 125milliGRAM(s) Oral every 6 hours    MEDICATIONS  (PRN):  dextrose Gel 1Dose(s) Oral once PRN Blood Glucose LESS THAN 70 milliGRAM(s)/deciliter  glucagon  Injectable 1milliGRAM(s) IntraMuscular once PRN Glucose LESS THAN 70 milligrams/deciliter  oxyCODONE IR 5milliGRAM(s) Oral every 3 hours PRN Mild Pain  ondansetron Injectable 4milliGRAM(s) IV Push every 6 hours PRN Nausea and/or Vomiting  HYDROmorphone  Injectable 0.5milliGRAM(s) IV Push every 4 hours PRN severe pain/breakthrough      Allergies    No Known Allergies    Intolerances        Vital Signs Last 24 Hrs  T(C): 36.4, Max: 37 (04-07 @ 07:50)  T(F): 97.5, Max: 98.6 (04-07 @ 07:50)  HR: 85 (83 - 85)  BP: 155/73 (155/73 - 159/66)  BP(mean): --  RR: 18 (18 - 18)  SpO2: 96% (96% - 96%)     ON PE:  General: alert and awake  Abdomen: soft, nt, nd, bs+, no masses       LABS:                        10.2   10.3  )-----------( 137      ( 07 Apr 2017 08:30 )             30.1     04-07    133<L>  |  100  |  15.0  ----------------------------<  97  3.8   |  25.0  |  0.78    Ca    8.0<L>      07 Apr 2017 08:30  Mg     1.5     04-07            RADIOLOGY & ADDITIONAL TESTS:

## 2017-04-08 DIAGNOSIS — D73.5 INFARCTION OF SPLEEN: ICD-10-CM

## 2017-04-08 DIAGNOSIS — Z29.9 ENCOUNTER FOR PROPHYLACTIC MEASURES, UNSPECIFIED: ICD-10-CM

## 2017-04-08 LAB
ANION GAP SERPL CALC-SCNC: 11 MMOL/L — SIGNIFICANT CHANGE UP (ref 5–17)
BASOPHILS # BLD AUTO: 0 K/UL — SIGNIFICANT CHANGE UP (ref 0–0.2)
BASOPHILS NFR BLD AUTO: 0.1 % — SIGNIFICANT CHANGE UP (ref 0–2)
BUN SERPL-MCNC: 14 MG/DL — SIGNIFICANT CHANGE UP (ref 8–20)
CALCIUM SERPL-MCNC: 8.1 MG/DL — LOW (ref 8.6–10.2)
CHLORIDE SERPL-SCNC: 101 MMOL/L — SIGNIFICANT CHANGE UP (ref 98–107)
CO2 SERPL-SCNC: 25 MMOL/L — SIGNIFICANT CHANGE UP (ref 22–29)
CREAT SERPL-MCNC: 0.62 MG/DL — SIGNIFICANT CHANGE UP (ref 0.5–1.3)
CULTURE RESULTS: SIGNIFICANT CHANGE UP
CULTURE RESULTS: SIGNIFICANT CHANGE UP
EOSINOPHIL # BLD AUTO: 0.1 K/UL — SIGNIFICANT CHANGE UP (ref 0–0.5)
EOSINOPHIL NFR BLD AUTO: 1.6 % — SIGNIFICANT CHANGE UP (ref 0–5)
GLUCOSE SERPL-MCNC: 111 MG/DL — SIGNIFICANT CHANGE UP (ref 70–115)
HCT VFR BLD CALC: 30.7 % — LOW (ref 42–52)
HGB BLD-MCNC: 10.3 G/DL — LOW (ref 14–18)
LYMPHOCYTES # BLD AUTO: 1 K/UL — SIGNIFICANT CHANGE UP (ref 1–4.8)
LYMPHOCYTES # BLD AUTO: 14 % — LOW (ref 20–55)
MCHC RBC-ENTMCNC: 33.2 PG — HIGH (ref 27–31)
MCHC RBC-ENTMCNC: 33.6 G/DL — SIGNIFICANT CHANGE UP (ref 32–36)
MCV RBC AUTO: 99 FL — HIGH (ref 80–94)
MONOCYTES # BLD AUTO: 0.9 K/UL — HIGH (ref 0–0.8)
MONOCYTES NFR BLD AUTO: 13.3 % — HIGH (ref 3–10)
NEUTROPHILS # BLD AUTO: 5 K/UL — SIGNIFICANT CHANGE UP (ref 1.8–8)
NEUTROPHILS NFR BLD AUTO: 70.7 % — SIGNIFICANT CHANGE UP (ref 37–73)
ORGANISM # SPEC MICROSCOPIC CNT: SIGNIFICANT CHANGE UP
ORGANISM # SPEC MICROSCOPIC CNT: SIGNIFICANT CHANGE UP
PLATELET # BLD AUTO: 139 K/UL — LOW (ref 150–400)
POTASSIUM SERPL-MCNC: 3.6 MMOL/L — SIGNIFICANT CHANGE UP (ref 3.5–5.3)
POTASSIUM SERPL-SCNC: 3.6 MMOL/L — SIGNIFICANT CHANGE UP (ref 3.5–5.3)
RBC # BLD: 3.1 M/UL — LOW (ref 4.6–6.2)
RBC # FLD: 13.4 % — SIGNIFICANT CHANGE UP (ref 11–15.6)
SODIUM SERPL-SCNC: 137 MMOL/L — SIGNIFICANT CHANGE UP (ref 135–145)
SPECIMEN SOURCE: SIGNIFICANT CHANGE UP
SPECIMEN SOURCE: SIGNIFICANT CHANGE UP
WBC # BLD: 7 K/UL — SIGNIFICANT CHANGE UP (ref 4.8–10.8)
WBC # FLD AUTO: 7 K/UL — SIGNIFICANT CHANGE UP (ref 4.8–10.8)

## 2017-04-08 PROCEDURE — 99231 SBSQ HOSP IP/OBS SF/LOW 25: CPT

## 2017-04-08 PROCEDURE — 99233 SBSQ HOSP IP/OBS HIGH 50: CPT

## 2017-04-08 RX ORDER — ACETAMINOPHEN 500 MG
650 TABLET ORAL EVERY 6 HOURS
Qty: 0 | Refills: 0 | Status: DISCONTINUED | OUTPATIENT
Start: 2017-04-08 | End: 2017-04-11

## 2017-04-08 RX ORDER — ENOXAPARIN SODIUM 100 MG/ML
40 INJECTION SUBCUTANEOUS DAILY
Qty: 0 | Refills: 0 | Status: DISCONTINUED | OUTPATIENT
Start: 2017-04-09 | End: 2017-04-11

## 2017-04-08 RX ADMIN — Medication 1 MILLIGRAM(S): at 12:05

## 2017-04-08 RX ADMIN — Medication 100 MILLIGRAM(S): at 12:06

## 2017-04-08 RX ADMIN — LISINOPRIL 40 MILLIGRAM(S): 2.5 TABLET ORAL at 05:00

## 2017-04-08 RX ADMIN — TAMSULOSIN HYDROCHLORIDE 0.4 MILLIGRAM(S): 0.4 CAPSULE ORAL at 20:43

## 2017-04-08 RX ADMIN — Medication 125 MILLIGRAM(S): at 12:04

## 2017-04-08 RX ADMIN — Medication 650 MILLIGRAM(S): at 17:00

## 2017-04-08 RX ADMIN — Medication 125 MILLIGRAM(S): at 05:00

## 2017-04-08 RX ADMIN — Medication 650 MILLIGRAM(S): at 23:51

## 2017-04-08 RX ADMIN — Medication 100 MILLIGRAM(S): at 04:56

## 2017-04-08 RX ADMIN — Medication 1 TABLET(S): at 12:04

## 2017-04-08 RX ADMIN — Medication 650 MILLIGRAM(S): at 16:16

## 2017-04-08 RX ADMIN — Medication 125 MILLIGRAM(S): at 17:00

## 2017-04-08 RX ADMIN — ENOXAPARIN SODIUM 30 MILLIGRAM(S): 100 INJECTION SUBCUTANEOUS at 04:59

## 2017-04-08 RX ADMIN — Medication 125 MILLIGRAM(S): at 23:50

## 2017-04-08 RX ADMIN — Medication 100 MILLIGRAM(S): at 20:42

## 2017-04-08 NOTE — PROGRESS NOTE ADULT - PROBLEM SELECTOR PLAN 3
As per CT scan - fluid collection in left prostate 2.7 x 2cm. PSA elevated. Pt has h/o multiple prostate biopsies in past, prior urology f/u noted and appreciated possible prostate abscess drainage next week

## 2017-04-08 NOTE — PROGRESS NOTE ADULT - SUBJECTIVE AND OBJECTIVE BOX
HPI:  82 yo M with h/o DM2, HTN, alcoholic cirrhosis, basal cell carcinoma s/p Moh's presents from PMD office with +MSSA left knee arthrocentesis. Blood culture 3/29 was positive for MSSA. Doppler study was negative for DVT. 3/31 had I&D of left knee with MARKUS drain. TTE showed EF 60-65%. MICAELA negative for vegetations. MARKUS drain removed. Continuing to have positive blood cultures. CT abd/pelvis revealed acute colitis, and fluid collection in the left prostate peripheral zone, measuring 2.7 x 2.0 cm. As per IR, urology will need to arrange for transrectal biopsy. Pt with numerous episodes of diarrhea - now c. diff positive.    Surgery consulted for evaluation due to CT findings of a possible splenic infarct. Denies any abdominal pain, c/o diarrhea. Denies any fevers, chills, SOB, chest pain.    PAST MEDICAL & SURGICAL HISTORY:  DM (diabetes mellitus)  No significant past surgical history    REVIEW OF SYSTEMS: all other systems reviewed, all negative except as in HPI    MEDICATIONS  (STANDING):  ceFAZolin   IVPB 2000milliGRAM(s) IV Intermittent <User Schedule>  insulin lispro (HumaLOG) corrective regimen sliding scale  SubCutaneous three times a day before meals  dextrose 50% Injectable 12.5Gram(s) IV Push once  dextrose 50% Injectable 25Gram(s) IV Push once  dextrose 50% Injectable 25Gram(s) IV Push once  folic acid 1milliGRAM(s) Oral daily  multivitamin 1Tablet(s) Oral daily  lisinopril 40milliGRAM(s) Oral daily  tamsulosin 0.4milliGRAM(s) Oral at bedtime  sodium chloride 0.9%. 1000milliLiter(s) IV Continuous <Continuous>  vancomycin    Solution 125milliGRAM(s) Oral every 6 hours    MEDICATIONS  (PRN):  dextrose Gel 1Dose(s) Oral once PRN Blood Glucose LESS THAN 70 milliGRAM(s)/deciliter  glucagon  Injectable 1milliGRAM(s) IntraMuscular once PRN Glucose LESS THAN 70 milligrams/deciliter  ondansetron Injectable 4milliGRAM(s) IV Push every 6 hours PRN Nausea and/or Vomiting  acetaminophen   Tablet. 650milliGRAM(s) Oral every 6 hours PRN Mild Pain (1 - 3)  oxyCODONE  5 mG/acetaminophen 325 mG 1Tablet(s) Oral every 8 hours PRN Severe Pain (7 - 10)      Allergies    No Known Allergies    Intolerances        SOCIAL HISTORY: denies toxic habits x3    FAMILY HISTORY:  No pertinent family history in first degree relatives      Vital Signs Last 24 Hrs  T(C): 36.8, Max: 36.8 (04-08 @ 15:27)  T(F): 98.3, Max: 98.3 (04-08 @ 15:27)  HR: 78 (62 - 78)  BP: 160/65 (137/64 - 160/65)  BP(mean): --  RR: 18 (18 - 18)  SpO2: 95% (95% - 98%)    PHYSICAL EXAM:    GENERAL: NAD, nontoxic appearring  HEENT: PERRL, +EOMI  NECK: soft, Supple, No JVD,   CHEST/LUNG: Clear to percussion bilaterally; No wheezing  HEART: S1S2+, Regular rate and rhythm; +3/6 systolic murmur  ABDOMEN: Soft, Nontender, Nondistended; Bowel sounds present  EXTREMITIES:  improving edema and erythema of left leg  SKIN: warm and dry  NEURO: AAOX3   PSYCH: normal mood    LABS:                        10.3   7.0   )-----------( 139      ( 08 Apr 2017 15:59 )             30.7     04-08    137  |  101  |  14.0  ----------------------------<  111  3.6   |  25.0  |  0.62    Ca    8.1<L>      08 Apr 2017 15:59  Mg     1.5     04-07            RADIOLOGY & ADDITIONAL STUDIES:

## 2017-04-08 NOTE — PROGRESS NOTE ADULT - PROBLEM SELECTOR PLAN 4
CT abd/pelvis showed possible developing infarct. Currently patients abdominal exam is benign.  -Surgery consulted and awaiting further recommendations for now will c/w abdominal exams and possibly will need repeat ct abd/pelvis

## 2017-04-08 NOTE — PROGRESS NOTE ADULT - ASSESSMENT
81M admitted for bacteremia 2/2 septic left knee, found to have prostate abscess, ?splenic infarct,+cdiff  -NO surgical interventino  -Serial abd exams  -Cont vancoPO, rec probiotics/yogurt with meals  -Will follow  See and examined with Dr. Montana

## 2017-04-08 NOTE — PROGRESS NOTE ADULT - SUBJECTIVE AND OBJECTIVE BOX
Patient is a 81y old  Male who presents with a chief complaint of knee pain (29 Mar 2017 18:20)      HEALTH ISSUES - PROBLEM Dx:  C. difficile diarrhea: C. difficile diarrhea  Prostatic abscess: Prostatic abscess  MSSA (methicillin susceptible Staphylococcus aureus) septicemia: MSSA (methicillin susceptible Staphylococcus aureus) septicemia  Persistent bacteremia: Persistent bacteremia  Urinary retention: Urinary retention  Essential hypertension: Essential hypertension  Alcohol use: Alcohol use  Sepsis, due to unspecified organism: Sepsis, due to unspecified organism  Bacteremia: Bacteremia  Staphylococcal arthritis of left knee: Staphylococcal arthritis of left knee  DM (diabetes mellitus): DM (diabetes mellitus)  Cellulitis of left lower extremity: Cellulitis of left lower extremity  Septic bursitis: Septic bursitis        INTERVAL HPI/OVERNIGHT EVENTS:  Patient seen and examined at bedside. No acute events overnight. Patient states he tried to get up out of bed today but was experiencing a lot of pain in the left knee. Otherwise stats he is feeling well, pt reports 2 episodes of watery diarrhea overnight and this morning. Patient denies chest pain, SOB, abd pain, N/V, fever, chills, dysuria or any other complaints. All remainder ROS negative.     Vital Signs Last 24 Hrs  T(C): 36.8, Max: 36.8 (04-08 @ 15:27)  T(F): 98.3, Max: 98.3 (04-08 @ 15:27)  HR: 78 (62 - 78)  BP: 160/65 (137/64 - 160/65)  BP(mean): --  RR: 18 (18 - 18)  SpO2: 95% (95% - 98%)    CAPILLARY BLOOD GLUCOSE  101 (08 Apr 2017 21:35)  107 (08 Apr 2017 16:10)  141 (08 Apr 2017 10:55)  91 (08 Apr 2017 07:30)      I&O's Summary  I & Os for 24h ending 08 Apr 2017 07:00  =============================================  IN: 750 ml / OUT: 1650 ml / NET: -900 ml    I & Os for current day (as of 08 Apr 2017 22:31)  =============================================  IN: 0 ml / OUT: 950 ml / NET: -950 ml      CONSTITUTIONAL: Well appearing, well nourished, awake, alert and in no apparent distress  CARDIAC: Normal rate, regular rhythm.  Heart sounds S1, S2.  No murmurs, rubs or gallops   RESPIRATORY: Breath sounds clear and equal bilaterally. No wheezes, rhales or rhonchi  GASTROENTEROLOGY: Soft, NT ND BS +  EXTREMITIES: No edema, cyanosis or deformity, left knee swollen with dressing C/DI  NEUROLOGICAL: Alert and oriented, no focal deficits, no motor or sensory deficits.  SKIN: No rash or lesions    MEDICATIONS  (STANDING):  ceFAZolin   IVPB 2000milliGRAM(s) IV Intermittent <User Schedule>  insulin lispro (HumaLOG) corrective regimen sliding scale  SubCutaneous three times a day before meals  dextrose 50% Injectable 12.5Gram(s) IV Push once  dextrose 50% Injectable 25Gram(s) IV Push once  dextrose 50% Injectable 25Gram(s) IV Push once  folic acid 1milliGRAM(s) Oral daily  multivitamin 1Tablet(s) Oral daily  lisinopril 40milliGRAM(s) Oral daily  tamsulosin 0.4milliGRAM(s) Oral at bedtime  sodium chloride 0.9%. 1000milliLiter(s) IV Continuous <Continuous>  vancomycin    Solution 125milliGRAM(s) Oral every 6 hours    MEDICATIONS  (PRN):  dextrose Gel 1Dose(s) Oral once PRN Blood Glucose LESS THAN 70 milliGRAM(s)/deciliter  glucagon  Injectable 1milliGRAM(s) IntraMuscular once PRN Glucose LESS THAN 70 milligrams/deciliter  ondansetron Injectable 4milliGRAM(s) IV Push every 6 hours PRN Nausea and/or Vomiting  acetaminophen   Tablet. 650milliGRAM(s) Oral every 6 hours PRN Mild Pain (1 - 3)  oxyCODONE  5 mG/acetaminophen 325 mG 1Tablet(s) Oral every 8 hours PRN Severe Pain (7 - 10)      LABS:                        10.3   7.0   )-----------( 139      ( 08 Apr 2017 15:59 )             30.7     04-08    137  |  101  |  14.0  ----------------------------<  111  3.6   |  25.0  |  0.62    Ca    8.1<L>      08 Apr 2017 15:59  Mg     1.5     04-07              RADIOLOGY & ADDITIONAL TESTS:

## 2017-04-08 NOTE — PROGRESS NOTE ADULT - ASSESSMENT
81 y.o. M with PMH DM2, HTN, alcoholic cirrhosis, basal cell carcinoma s/p Moh's presents from PMD office with +MSSA left knee arthrocentesis S/P I&D of left knee 3/31/17. MSSA bacteremia positive Bcx 3/29/17 and 4/2/17 and repeat Bcx4/5/17 and 4/6/17  showing no growth thus far, with negative TTE/MICAELA for vegetations. Patient also developed C. Diff and was found to have colitis on the ct abd/pelvis, as well as a  prostate abscess which will likely be drained next week. Incidental finding of developing splenic infarct observed on ct abd/pelvis on 4/5/17 for which surgery was consulted.

## 2017-04-09 LAB
ANION GAP SERPL CALC-SCNC: 10 MMOL/L — SIGNIFICANT CHANGE UP (ref 5–17)
BASOPHILS # BLD AUTO: 0 K/UL — SIGNIFICANT CHANGE UP (ref 0–0.2)
BASOPHILS NFR BLD AUTO: 0.1 % — SIGNIFICANT CHANGE UP (ref 0–2)
BUN SERPL-MCNC: 15 MG/DL — SIGNIFICANT CHANGE UP (ref 8–20)
CALCIUM SERPL-MCNC: 8.2 MG/DL — LOW (ref 8.6–10.2)
CHLORIDE SERPL-SCNC: 99 MMOL/L — SIGNIFICANT CHANGE UP (ref 98–107)
CO2 SERPL-SCNC: 23 MMOL/L — SIGNIFICANT CHANGE UP (ref 22–29)
CREAT SERPL-MCNC: 0.77 MG/DL — SIGNIFICANT CHANGE UP (ref 0.5–1.3)
CULTURE RESULTS: SIGNIFICANT CHANGE UP
CULTURE RESULTS: SIGNIFICANT CHANGE UP
EOSINOPHIL # BLD AUTO: 0.1 K/UL — SIGNIFICANT CHANGE UP (ref 0–0.5)
EOSINOPHIL NFR BLD AUTO: 0.7 % — SIGNIFICANT CHANGE UP (ref 0–5)
FERRITIN SERPL-MCNC: 593 NG/ML — HIGH (ref 30–400)
FOLATE SERPL-MCNC: 15.6 NG/ML — SIGNIFICANT CHANGE UP (ref 4–16)
GLUCOSE SERPL-MCNC: 113 MG/DL — SIGNIFICANT CHANGE UP (ref 70–115)
HCT VFR BLD CALC: 31.1 % — LOW (ref 42–52)
HGB BLD-MCNC: 10.4 G/DL — LOW (ref 14–18)
IRON SATN MFR SERPL: 22 % — SIGNIFICANT CHANGE UP (ref 16–55)
IRON SATN MFR SERPL: 36 UG/DL — LOW (ref 59–158)
LYMPHOCYTES # BLD AUTO: 0.9 K/UL — LOW (ref 1–4.8)
LYMPHOCYTES # BLD AUTO: 10.8 % — LOW (ref 20–55)
MCHC RBC-ENTMCNC: 32.9 PG — HIGH (ref 27–31)
MCHC RBC-ENTMCNC: 33.4 G/DL — SIGNIFICANT CHANGE UP (ref 32–36)
MCV RBC AUTO: 98.4 FL — HIGH (ref 80–94)
MONOCYTES # BLD AUTO: 0.9 K/UL — HIGH (ref 0–0.8)
MONOCYTES NFR BLD AUTO: 11.2 % — HIGH (ref 3–10)
NEUTROPHILS # BLD AUTO: 6.2 K/UL — SIGNIFICANT CHANGE UP (ref 1.8–8)
NEUTROPHILS NFR BLD AUTO: 76.8 % — HIGH (ref 37–73)
PLATELET # BLD AUTO: 146 K/UL — LOW (ref 150–400)
POTASSIUM SERPL-MCNC: 3.5 MMOL/L — SIGNIFICANT CHANGE UP (ref 3.5–5.3)
POTASSIUM SERPL-SCNC: 3.5 MMOL/L — SIGNIFICANT CHANGE UP (ref 3.5–5.3)
RBC # BLD: 3.16 M/UL — LOW (ref 4.6–6.2)
RBC # FLD: 13.4 % — SIGNIFICANT CHANGE UP (ref 11–15.6)
SODIUM SERPL-SCNC: 132 MMOL/L — LOW (ref 135–145)
SPECIMEN SOURCE: SIGNIFICANT CHANGE UP
SPECIMEN SOURCE: SIGNIFICANT CHANGE UP
TIBC SERPL-MCNC: 167 UG/DL — LOW (ref 220–430)
TRANSFERRIN SERPL-MCNC: 117 MG/DL — LOW (ref 180–329)
VIT B12 SERPL-MCNC: 966 PG/ML — HIGH (ref 180–914)
WBC # BLD: 8.1 K/UL — SIGNIFICANT CHANGE UP (ref 4.8–10.8)
WBC # FLD AUTO: 8.1 K/UL — SIGNIFICANT CHANGE UP (ref 4.8–10.8)

## 2017-04-09 PROCEDURE — 99232 SBSQ HOSP IP/OBS MODERATE 35: CPT

## 2017-04-09 PROCEDURE — 99233 SBSQ HOSP IP/OBS HIGH 50: CPT

## 2017-04-09 RX ORDER — VANCOMYCIN HCL 1 G
125 VIAL (EA) INTRAVENOUS EVERY 6 HOURS
Qty: 0 | Refills: 0 | Status: DISCONTINUED | OUTPATIENT
Start: 2017-04-09 | End: 2017-04-11

## 2017-04-09 RX ORDER — SACCHAROMYCES BOULARDII 250 MG
250 POWDER IN PACKET (EA) ORAL
Qty: 0 | Refills: 0 | Status: DISCONTINUED | OUTPATIENT
Start: 2017-04-09 | End: 2017-04-11

## 2017-04-09 RX ADMIN — Medication 125 MILLIGRAM(S): at 05:22

## 2017-04-09 RX ADMIN — Medication 650 MILLIGRAM(S): at 22:00

## 2017-04-09 RX ADMIN — TAMSULOSIN HYDROCHLORIDE 0.4 MILLIGRAM(S): 0.4 CAPSULE ORAL at 21:08

## 2017-04-09 RX ADMIN — Medication 100 MILLIGRAM(S): at 05:21

## 2017-04-09 RX ADMIN — Medication 125 MILLIGRAM(S): at 17:31

## 2017-04-09 RX ADMIN — Medication 650 MILLIGRAM(S): at 00:30

## 2017-04-09 RX ADMIN — Medication: at 10:49

## 2017-04-09 RX ADMIN — Medication 125 MILLIGRAM(S): at 11:00

## 2017-04-09 RX ADMIN — Medication 250 MILLIGRAM(S): at 17:31

## 2017-04-09 RX ADMIN — Medication 100 MILLIGRAM(S): at 11:00

## 2017-04-09 RX ADMIN — Medication 1 TABLET(S): at 11:00

## 2017-04-09 RX ADMIN — LISINOPRIL 40 MILLIGRAM(S): 2.5 TABLET ORAL at 05:22

## 2017-04-09 RX ADMIN — Medication 650 MILLIGRAM(S): at 21:09

## 2017-04-09 RX ADMIN — Medication 100 MILLIGRAM(S): at 21:08

## 2017-04-09 RX ADMIN — Medication 650 MILLIGRAM(S): at 05:26

## 2017-04-09 RX ADMIN — Medication 650 MILLIGRAM(S): at 06:15

## 2017-04-09 RX ADMIN — ENOXAPARIN SODIUM 40 MILLIGRAM(S): 100 INJECTION SUBCUTANEOUS at 11:00

## 2017-04-09 RX ADMIN — Medication 1 MILLIGRAM(S): at 11:00

## 2017-04-09 NOTE — PROGRESS NOTE ADULT - ASSESSMENT
82 y/o M with Left knee septic arthritis with MSSA and MSSA bacteremia;  All cultures from blood negative since 4/4/17

## 2017-04-09 NOTE — PROGRESS NOTE ADULT - SUBJECTIVE AND OBJECTIVE BOX
Patient is a 81y old  Male who presents with a chief complaint of knee pain (29 Mar 2017 18:20)      HEALTH ISSUES - PROBLEM Dx:  Prophylactic measure: Prophylactic measure  Splenic infarct: Splenic infarct  C. difficile diarrhea: C. 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 No acute events overnight. Patient states that he feels like he has to go to the bathroom with diarrhea multiple times throughout the day, usually a small amount comes out. Patient denies chest pain, SOB, abd pain, N/V, fever, chills, dysuria or any other complaints. All remainder ROS negative.     Vital Signs Last 24 Hrs  T(C): 37.1, Max: 37.1 (04-09 @ 07:21)  T(F): 98.8, Max: 98.8 (04-09 @ 07:21)  HR: 75 (75 - 75)  BP: 125/68 (125/68 - 144/65)  BP(mean): --  RR: 18 (18 - 18)  SpO2: 98% (98% - 98%)    CAPILLARY BLOOD GLUCOSE  120 (09 Apr 2017 16:08)  164 (09 Apr 2017 10:50)  112 (09 Apr 2017 07:26)  101 (08 Apr 2017 21:35)      I&O's Summary    I & Os for current day (as of 09 Apr 2017 16:37)  =============================================  IN: 100 ml / OUT: 1350 ml / NET: -1250 ml      CONSTITUTIONAL: Well appearing, well nourished, awake, alert and in no apparent distress  CARDIAC: Normal rate, regular rhythm.  Heart sounds S1, S2.  No murmurs, rubs or gallops   RESPIRATORY: Breath sounds clear and equal bilaterally. No wheezes, rhales or rhonchi  GASTROENTEROLOGY: Soft, NT ND BS +  EXTREMITIES: No edema, cyanosis or deformity, left knee swollen with dressing C/DI  NEUROLOGICAL: Alert and oriented, no focal deficits, no motor or sensory deficits.  SKIN: No rash or lesions    MEDICATIONS  (STANDING):  ceFAZolin   IVPB 2000milliGRAM(s) IV Intermittent <User Schedule>  insulin lispro (HumaLOG) corrective regimen sliding scale  SubCutaneous three times a day before meals  dextrose 50% Injectable 12.5Gram(s) IV Push once  dextrose 50% Injectable 25Gram(s) IV Push once  dextrose 50% Injectable 25Gram(s) IV Push once  folic acid 1milliGRAM(s) Oral daily  multivitamin 1Tablet(s) Oral daily  lisinopril 40milliGRAM(s) Oral daily  tamsulosin 0.4milliGRAM(s) Oral at bedtime  enoxaparin Injectable 40milliGRAM(s) SubCutaneous daily  vancomycin    Solution 125milliGRAM(s) Oral every 6 hours    MEDICATIONS  (PRN):  dextrose Gel 1Dose(s) Oral once PRN Blood Glucose LESS THAN 70 milliGRAM(s)/deciliter  glucagon  Injectable 1milliGRAM(s) IntraMuscular once PRN Glucose LESS THAN 70 milligrams/deciliter  ondansetron Injectable 4milliGRAM(s) IV Push every 6 hours PRN Nausea and/or Vomiting  acetaminophen   Tablet. 650milliGRAM(s) Oral every 6 hours PRN Mild Pain (1 - 3)  oxyCODONE  5 mG/acetaminophen 325 mG 1Tablet(s) Oral every 8 hours PRN Severe Pain (7 - 10)      LABS:                        10.4   8.1   )-----------( 146      ( 09 Apr 2017 09:12 )             31.1     04-09    132<L>  |  99  |  15.0  ----------------------------<  113  3.5   |  23.0  |  0.77    Ca    8.2<L>      09 Apr 2017 09:12              RADIOLOGY & ADDITIONAL TESTS:

## 2017-04-09 NOTE — PROGRESS NOTE ADULT - PROBLEM SELECTOR PLAN 3
1. Cont care per primary team   2. Pain management   3. Antibiotics  4. Surgery to continue to follow

## 2017-04-09 NOTE — PROGRESS NOTE ADULT - PROBLEM SELECTOR PLAN 3
- continue Vanco PO  - anticipate 14 days of treatment from 4/5/17 - continue Vanco PO 125mg Q6H  - anticipate 14 days of treatment from 4/5/17

## 2017-04-09 NOTE — PROGRESS NOTE ADULT - SUBJECTIVE AND OBJECTIVE BOX
INTERVAL HPI/OVERNIGHT EVENTS: Comfortable with the sneed.  No pain,     MEDICATIONS  (STANDING):  ceFAZolin   IVPB 2000milliGRAM(s) IV Intermittent <User Schedule>  insulin lispro (HumaLOG) corrective regimen sliding scale  SubCutaneous three times a day before meals  dextrose 50% Injectable 12.5Gram(s) IV Push once  dextrose 50% Injectable 25Gram(s) IV Push once  dextrose 50% Injectable 25Gram(s) IV Push once  folic acid 1milliGRAM(s) Oral daily  multivitamin 1Tablet(s) Oral daily  lisinopril 40milliGRAM(s) Oral daily  tamsulosin 0.4milliGRAM(s) Oral at bedtime  enoxaparin Injectable 40milliGRAM(s) SubCutaneous daily  vancomycin    Solution 125milliGRAM(s) Oral every 6 hours  saccharomyces boulardii 250milliGRAM(s) Oral two times a day    MEDICATIONS  (PRN):  dextrose Gel 1Dose(s) Oral once PRN Blood Glucose LESS THAN 70 milliGRAM(s)/deciliter  glucagon  Injectable 1milliGRAM(s) IntraMuscular once PRN Glucose LESS THAN 70 milligrams/deciliter  ondansetron Injectable 4milliGRAM(s) IV Push every 6 hours PRN Nausea and/or Vomiting  acetaminophen   Tablet. 650milliGRAM(s) Oral every 6 hours PRN Mild Pain (1 - 3)  oxyCODONE  5 mG/acetaminophen 325 mG 1Tablet(s) Oral every 8 hours PRN Severe Pain (7 - 10)      Allergies    No Known Allergies    Intolerances        Vital Signs Last 24 Hrs  T(C): 37.1, Max: 37.1 (04-09 @ 07:21)  T(F): 98.8, Max: 98.8 (04-09 @ 07:21)  HR: 72 (72 - 75)  BP: 150/61 (125/68 - 150/61)  BP(mean): --  RR: 18 (18 - 18)  SpO2: 94% (94% - 98%)     ON PE:  General: alert and awake  Abdomen: soft, nt, nd, bs+    sneed with clear yellow urine       LABS:                        10.4   8.1   )-----------( 146      ( 09 Apr 2017 09:12 )             31.1     04-09    132<L>  |  99  |  15.0  ----------------------------<  113  3.5   |  23.0  |  0.77    Ca    8.2<L>      09 Apr 2017 09:12            RADIOLOGY & ADDITIONAL TESTS:

## 2017-04-09 NOTE — PROGRESS NOTE ADULT - SUBJECTIVE AND OBJECTIVE BOX
[facility]  INFECTIOUS DISEASES AND INTERNAL MEDICINE OF [place]  =======================================================  Ketan Ramesh MD  Diplomates American Board of Internal Medicine and Infectious Diseases  =======================================================    MAGED NOBLE   MRN-[id number]    S/P Left Knee arthroplasty 3/31/17    follow up for bacteremia and septic joint  C diff tested positive on 4/5/17  MRI 4/7/17 without OM or psoas abscess  =======================================================     Allergies:  No Known Allergies  MEDICATIONS  (STANDING):  ceFAZolin   IVPB 2000milliGRAM(s) IV Intermittent <User Schedule>  insulin lispro (HumaLOG) corrective regimen sliding scale  SubCutaneous three times a day before meals  dextrose 50% Injectable 12.5Gram(s) IV Push once  dextrose 50% Injectable 25Gram(s) IV Push once  dextrose 50% Injectable 25Gram(s) IV Push once  folic acid 1milliGRAM(s) Oral daily  multivitamin 1Tablet(s) Oral daily  lisinopril 40milliGRAM(s) Oral daily  tamsulosin 0.4milliGRAM(s) Oral at bedtime  vancomycin    Solution 125milliGRAM(s) Oral every 6 hours  enoxaparin Injectable 40milliGRAM(s) SubCutaneous daily     =======================================================     REVIEW OF SYSTEMS:  CONSTITUTIONAL:  No chills  HEENT:   No diplopia or blurred vision. No earache, sore throat or runny nose.  CARDIOVASCULAR:  No pressure, squeezing, strangling, tightness, heaviness or aching about the chest, neck, axilla or epigastrium.  RESPIRATORY:  No cough, shortness of breat  GASTROINTESTINAL:  No nausea, vomiting or diarrhea.  GENITOURINARY:  No dysuria, frequency or urgency.   MUSCULOSKELETAL:  Left knee pain  SKIN:  No change in skin, hair or nails.  NEUROLOGIC:  No paresthesias, fasciculations  PSYCHIATRIC:  No disorder of thought or mood.  ENDOCRINE:  No heat or cold intolerance, polyuria or polydipsia.  HEMATOLOGICAL:  No easy bruising or bleeding.  =======================================================  Physical Exam:  Vital Signs Last 24 Hrs  T(C): 37.1, Max: 37.1 (04-09 @ 07:21)  T(F): 98.8, Max: 98.8 (04-09 @ 07:21)  HR: 75 (75 - 78)  BP: 125/68 (125/68 - 160/65)  BP(mean): --  RR: 18 (18 - 18)  SpO2: 98% (95% - 98%)    GEN: NAD, pleasant  HEENT: normocephalic and atraumatic. EOMI. PERRL.    NECK: Supple.   LUNGS: Clear to auscultation.  HEART: Regular rate and rhythm  ABDOMEN: Soft, nontender, and nondistended.  Positive bowel sounds.    : no CVA tenderness  EXTREMITIES: LLE knee swelling and leg swelling  MSK: Left knee joint effusion and swelling  NEUROLOGIC: AAOx3  PSYCHIATRIC: Appropriate affect .  SKIN: Left knee redness and warmth    =======================================================  Labs:  04-09    132<L>  |  99  |  15.0  ----------------------------<  113  3.5   |  23.0  |  0.77    Ca    8.2<L>      09 Apr 2017 09:12                            10.4   8.1   )-----------( 146      ( 09 Apr 2017 09:12 )             31.1              CAPILLARY BLOOD GLUCOSE  164 (09 Apr 2017 10:50)  112 (09 Apr 2017 07:26)  101 (08 Apr 2017 21:35)  107 (08 Apr 2017 16:10)        RECENT CULTURES:  04-06 .Blood Blood XXXX XXXX   No growth at 48 hours    04-06 .Blood Blood XXXX XXXX   No growth at 48 hours    04-05 .Blood Blood XXXX XXXX   No growth at 48 hours    04-04 .Blood Blood-Peripheral XXXX XXXX   No growth at 5 days.    04-04 .Blood Blood-Peripheral XXXX XXXX   No growth at 5 days.    04-03 .Urine Catheterized XXXX XXXX   No growth  ==============  04-02 .Blood Blood-Peripheral Staphylococcus aureus XXXX   Growth in anaerobic bottle: Staphylococcus aureus  Anaerobic Bottle: 1 day:06:04 Hours to positivity  Aerobic Bottle: No growth at 5 days.  .   .  TYPE: (C=Critical, N=Notification, A=Abnormal) C  TESTS:  _ BLD GS  DATE/TIME CALLED: _ 04/04/2017 04-01 .Blood Blood XXXX XXXX   No growth at 48 hours    03-31 .Tissue L Knee Bursa Tissue XXXX XXXX XXXX    03-31 .Tissue L Knee Tissue Staphylococcus aureus   Few White blood cells  No organisms seen   Rare Staphylococcus aureus  Culture in progress    03-31 .Tissue Tissue L Knee Bursa Staphylococcus aureus   Few White blood cells  No organisms seen   Rare Staphylococcus aureus  Culture in progress    03-31 .Surgical Swab Swab L Knee Joint Staphylococcus aureus   Few White blood cells  No organisms seen   Rare Staphylococcus aureus  Culture in progress    03-31 .Surgical Swab Swab Left Knee Staphylococcus aureus   Few White blood cells  No organisms seen   Rare Staphylococcus aureus  Culture in progress    03-31 .Blood Blood Staphylococcus aureus XXXX   Growth in aerobic bottle: Staphylococcus aureus  Aerobic Bottle: 2 days and 08:23 Hours to positivity  Anaerobic Bottle: No growth to date  .  TYPE: (C=Critical, N=Notification, A=Abnormal) C  TESTS:  _ GS: Bld Culture  DATE/TIME CALLED: _ 04/02/201 03-30 .Blood Blood XXXX XXXX   No growth at 48 hours    03-30 .Body Fluid Synovial Fluid Staphylococcus aureus   Numerous white blood cells  No organisms seen   Rare Staphylococcus aureus  Culture in progress    03-29 .Blood Blood Staphylococcus aureus XXXX   Growth in aerobic and anaerobic bottles: Staphylococcus aureus  Aerobic Bottle: 14.18 Hours to positivity  Anaerobic Bottle: 1 day 12:02 Hours to positivity  .  TYPE: (C=Critical, N=Notification, A=Abnormal) C  TESTS:  _   DATE/TIME CALLED: _ 03/3    03-29 .Blood Blood Staphylococcus aureus XXXX   Growth in aerobic and anaerobic bottles: Staphylococcus aureus  Aerobic Bottle: 14.38 Hours to positivity  Anaerobic Bottle: 1 day 2 Hours to positivity  .  TYPE: (C=Critical, N=Notification, A=Abnormal) C  TESTS:  _   DATE/TIME CALLED: _ 03/30/20      Cultures from Dr Doe's office:  03/28 Blood cultures  No growth x5 days    03/28 Blood cultures  Staphylococcus Aureus (MSSA)    3/24 Left knee aspiration cx  MSSA      ==========       EXAM:  THORACIC SPINE(MRI)W WO CON                         EXAM:  LUMBAR SPINE(MRI)W O CON                          PROCEDURE DATE:  04/07/2017        INTERPRETATION:  CLINICAL HISTORY: Tenderness a bacteremia, rule out   epidural abscess, psoas abscess, discitis    COMPARISON: None.    TECHNIQUE: Thoracic and lumbar spine MRI: Multiplanar, multisequence MR   images of the thoracic and lumbar spine with and without the   administration of 8 cc intravenous Gadavist contrast. The patient refused   postcontrast images of the lumbar spine.     FINDINGS:    THORACIC AND LUMBAR SPINE MRI:  No bone marrow edema or disc edema at the thoracic or lumbar spine to   suggest discitis/cellulitis. No epidural collection. No evidence of cord   compression or cord signal abnormality. Mild multilevel discogenic   degenerative changes of the thoracic spine. Mild disc desiccation at   L1/L2, L3/L4, L4/L5 levels. The conus medullaris ends at the T12/L1   level. Small tiny hemangioma within the L2 vertebral body. No abnormal   enhancement in the thoracic spine. The visualized SI joints are symmetric.    Small bilateral pleural effusions. Small midline disc herniation at T8/T9   level moderately deforming the ventral aspect of the cord. Tiny disc   herniation at T7/T8 mildly deforming the ventral aspect of the cord. No   cord signal abnormality. No other significant disc herniation, spinal   canal stenosis, foraminal narrowing in the thoracic spine.      Evaluation of the individual levels:  L1/L2 level: Mild disc bulge. No spinal canal stenosis. Mild bilateral   foraminal narrowing.  L2/L3 level: Mild disc bulge. No spinal canal stenosis or foraminal   narrowing.  L3/L4 level: Moderate disc bulge. No spinal canal stenosis. No foraminal   narrowing.  L4/L5 level: Broad-based disc ridge complex. Mild spinal canal stenosis.   Mild to moderate left-sided foraminal narrowing. No right-sided foraminal   narrowing.  L5/S1 level: Broad-based ridging. No spinal canal stenosis. No foraminal   narrowing.    IMPRESSION:     No evidence of discitis/osteomyelitis. No evidence of a   psoas muscle abscess. [facility]  INFECTIOUS DISEASES AND INTERNAL MEDICINE OF [place]  =======================================================  Ketan Ramesh MD  Diplomates American Board of Internal Medicine and Infectious Diseases  =======================================================    MAGED NOBLE   MRN-[id number]    S/P Left Knee arthroplasty 3/31/17    follow up for bacteremia and septic joint  C diff tested positive on 4/5/17  MRI 4/7/17 without OM or psoas abscess    Still with 3 episodes of watery BM. small volume.  Afraid to eat  - less pain in the LEFT knee    =======================================================     Allergies:  No Known Allergies  MEDICATIONS  (STANDING):  ceFAZolin   IVPB 2000milliGRAM(s) IV Intermittent <User Schedule>  insulin lispro (HumaLOG) corrective regimen sliding scale  SubCutaneous three times a day before meals  dextrose 50% Injectable 12.5Gram(s) IV Push once  dextrose 50% Injectable 25Gram(s) IV Push once  dextrose 50% Injectable 25Gram(s) IV Push once  folic acid 1milliGRAM(s) Oral daily  multivitamin 1Tablet(s) Oral daily  lisinopril 40milliGRAM(s) Oral daily  tamsulosin 0.4milliGRAM(s) Oral at bedtime  vancomycin    Solution 125milliGRAM(s) Oral every 6 hours  enoxaparin Injectable 40milliGRAM(s) SubCutaneous daily     =======================================================     REVIEW OF SYSTEMS:  CONSTITUTIONAL:  No chills  HEENT:   No diplopia or blurred vision. No earache, sore throat or runny nose.  CARDIOVASCULAR:  No pressure, squeezing, strangling, tightness, heaviness or aching about the chest, neck, axilla or epigastrium.  RESPIRATORY:  No cough, shortness of breat  GASTROINTESTINAL:  No nausea, vomiting.  + DIARRHEA  GENITOURINARY:  No dysuria, frequency or urgency.   MUSCULOSKELETAL:  Left knee pain IMPROVING  SKIN:  No change in skin, hair or nails.  NEUROLOGIC:  No paresthesias, fasciculations  PSYCHIATRIC:  No disorder of thought or mood.  ENDOCRINE:  No heat or cold intolerance, polyuria or polydipsia.  HEMATOLOGICAL:  No easy bruising or bleeding.  =======================================================  Physical Exam:  Vital Signs Last 24 Hrs  T(C): 37.1, Max: 37.1 (04-09 @ 07:21)  T(F): 98.8, Max: 98.8 (04-09 @ 07:21)  HR: 75 (75 - 78)  BP: 125/68 (125/68 - 160/65)  BP(mean): --  RR: 18 (18 - 18)  SpO2: 98% (95% - 98%)    GEN: NAD, pleasant, thin  HEENT: normocephalic and atraumatic. EOMI. PERRL.  dry OM  NECK: Supple.   LUNGS: Clear to auscultation.  HEART: Regular rate and rhythm; NO edema  ABDOMEN: Soft, nontender, and nondistended.  Positive bowel sounds.    : no CVA tenderness  + KHAN with clear urine  EXTREMITIES: LLE knee LESS Swelling  NEUROLOGIC: AAOx3  PSYCHIATRIC: Appropriate affect .  SKIN: Left knee RESOLUTION OF redness and warmth    =======================================================  Labs:  04-09    132<L>  |  99  |  15.0  ----------------------------<  113  3.5   |  23.0  |  0.77    Ca    8.2<L>      09 Apr 2017 09:12                            10.4   8.1   )-----------( 146      ( 09 Apr 2017 09:12 )             31.1              CAPILLARY BLOOD GLUCOSE  164 (09 Apr 2017 10:50)  112 (09 Apr 2017 07:26)  101 (08 Apr 2017 21:35)  107 (08 Apr 2017 16:10)        RECENT CULTURES:  04-06 .Blood Blood XXXX XXXX   No growth at 48 hours    04-06 .Blood Blood XXXX XXXX   No growth at 48 hours    04-05 .Blood Blood XXXX XXXX   No growth at 48 hours    04-04 .Blood Blood-Peripheral XXXX XXXX   No growth at 5 days.    04-04 .Blood Blood-Peripheral XXXX XXXX   No growth at 5 days.    04-03 .Urine Catheterized XXXX XXXX   No growth  ==============  04-02 .Blood Blood-Peripheral Staphylococcus aureus XXXX   Growth in anaerobic bottle: Staphylococcus aureus  Anaerobic Bottle: 1 day:06:04 Hours to positivity  Aerobic Bottle: No growth at 5 days.  .   .  TYPE: (C=Critical, N=Notification, A=Abnormal) C  TESTS:  _ BLD GS  DATE/TIME CALLED: _ 04/04/2017 04-01 .Blood Blood XXXX XXXX   No growth at 48 hours    03-31 .Tissue L Knee Bursa Tissue XXXX XXXX XXXX    03-31 .Tissue L Knee Tissue Staphylococcus aureus   Few White blood cells  No organisms seen   Rare Staphylococcus aureus  Culture in progress    03-31 .Tissue Tissue L Knee Bursa Staphylococcus aureus   Few White blood cells  No organisms seen   Rare Staphylococcus aureus  Culture in progress    03-31 .Surgical Swab Swab L Knee Joint Staphylococcus aureus   Few White blood cells  No organisms seen   Rare Staphylococcus aureus  Culture in progress    03-31 .Surgical Swab Swab Left Knee Staphylococcus aureus   Few White blood cells  No organisms seen   Rare Staphylococcus aureus  Culture in progress    03-31 .Blood Blood Staphylococcus aureus XXXX   Growth in aerobic bottle: Staphylococcus aureus  Aerobic Bottle: 2 days and 08:23 Hours to positivity  Anaerobic Bottle: No growth to date  .  TYPE: (C=Critical, N=Notification, A=Abnormal) C  TESTS:  _ GS: Bld Culture  DATE/TIME CALLED: _ 04/02/201 03-30 .Blood Blood XXXX XXXX   No growth at 48 hours    03-30 .Body Fluid Synovial Fluid Staphylococcus aureus   Numerous white blood cells  No organisms seen   Rare Staphylococcus aureus  Culture in progress    03-29 .Blood Blood Staphylococcus aureus XXXX   Growth in aerobic and anaerobic bottles: Staphylococcus aureus  Aerobic Bottle: 14.18 Hours to positivity  Anaerobic Bottle: 1 day 12:02 Hours to positivity  .  TYPE: (C=Critical, N=Notification, A=Abnormal) C  TESTS:  _ GS  DATE/TIME CALLED: _ 03/3    03-29 .Blood Blood Staphylococcus aureus XXXX   Growth in aerobic and anaerobic bottles: Staphylococcus aureus  Aerobic Bottle: 14.38 Hours to positivity  Anaerobic Bottle: 1 day 2 Hours to positivity  .  TYPE: (C=Critical, N=Notification, A=Abnormal) C  TESTS:  _ GS  DATE/TIME CALLED: _ 03/30/20      Cultures from Dr Doe's office:  03/28 Blood cultures  No growth x5 days    03/28 Blood cultures  Staphylococcus Aureus (MSSA)    3/24 Left knee aspiration cx  MSSA      ==========       EXAM:  THORACIC SPINE(MRI)W WO CON                         EXAM:  LUMBAR SPINE(MRI)W O CON                          PROCEDURE DATE:  04/07/2017        INTERPRETATION:  CLINICAL HISTORY: Tenderness a bacteremia, rule out   epidural abscess, psoas abscess, discitis    COMPARISON: None.    TECHNIQUE: Thoracic and lumbar spine MRI: Multiplanar, multisequence MR   images of the thoracic and lumbar spine with and without the   administration of 8 cc intravenous Gadavist contrast. The patient refused   postcontrast images of the lumbar spine.     FINDINGS:    THORACIC AND LUMBAR SPINE MRI:  No bone marrow edema or disc edema at the thoracic or lumbar spine to   suggest discitis/cellulitis. No epidural collection. No evidence of cord   compression or cord signal abnormality. Mild multilevel discogenic   degenerative changes of the thoracic spine. Mild disc desiccation at   L1/L2, L3/L4, L4/L5 levels. The conus medullaris ends at the T12/L1   level. Small tiny hemangioma within the L2 vertebral body. No abnormal   enhancement in the thoracic spine. The visualized SI joints are symmetric.    Small bilateral pleural effusions. Small midline disc herniation at T8/T9   level moderately deforming the ventral aspect of the cord. Tiny disc   herniation at T7/T8 mildly deforming the ventral aspect of the cord. No   cord signal abnormality. No other significant disc herniation, spinal   canal stenosis, foraminal narrowing in the thoracic spine.      Evaluation of the individual levels:  L1/L2 level: Mild disc bulge. No spinal canal stenosis. Mild bilateral   foraminal narrowing.  L2/L3 level: Mild disc bulge. No spinal canal stenosis or foraminal   narrowing.  L3/L4 level: Moderate disc bulge. No spinal canal stenosis. No foraminal   narrowing.  L4/L5 level: Broad-based disc ridge complex. Mild spinal canal stenosis.   Mild to moderate left-sided foraminal narrowing. No right-sided foraminal   narrowing.  L5/S1 level: Broad-based ridging. No spinal canal stenosis. No foraminal   narrowing.    IMPRESSION:     No evidence of discitis/osteomyelitis. No evidence of a   psoas muscle abscess.

## 2017-04-09 NOTE — PROGRESS NOTE ADULT - PROBLEM SELECTOR PLAN 3
As per CT scan - fluid collection in left prostate 2.7 x 2cm.   PSA elevated.   Pt has h/o multiple prostate biopsies in past, prior urology f/u noted and appreciated possible prostate abscess drainage this coming week. Will d/w urology in am.

## 2017-04-09 NOTE — PROGRESS NOTE ADULT - SUBJECTIVE AND OBJECTIVE BOX
INTERVAL HPI/OVERNIGHT EVENTS:  Feels well.  No groin pain.    MEDICATIONS  (STANDING):  ceFAZolin   IVPB 2000milliGRAM(s) IV Intermittent <User Schedule>  insulin lispro (HumaLOG) corrective regimen sliding scale  SubCutaneous three times a day before meals  dextrose 50% Injectable 12.5Gram(s) IV Push once  dextrose 50% Injectable 25Gram(s) IV Push once  dextrose 50% Injectable 25Gram(s) IV Push once  folic acid 1milliGRAM(s) Oral daily  multivitamin 1Tablet(s) Oral daily  lisinopril 40milliGRAM(s) Oral daily  tamsulosin 0.4milliGRAM(s) Oral at bedtime  enoxaparin Injectable 40milliGRAM(s) SubCutaneous daily  vancomycin    Solution 125milliGRAM(s) Oral every 6 hours  saccharomyces boulardii 250milliGRAM(s) Oral two times a day    MEDICATIONS  (PRN):  dextrose Gel 1Dose(s) Oral once PRN Blood Glucose LESS THAN 70 milliGRAM(s)/deciliter  glucagon  Injectable 1milliGRAM(s) IntraMuscular once PRN Glucose LESS THAN 70 milligrams/deciliter  ondansetron Injectable 4milliGRAM(s) IV Push every 6 hours PRN Nausea and/or Vomiting  acetaminophen   Tablet. 650milliGRAM(s) Oral every 6 hours PRN Mild Pain (1 - 3)  oxyCODONE  5 mG/acetaminophen 325 mG 1Tablet(s) Oral every 8 hours PRN Severe Pain (7 - 10)      Allergies    No Known Allergies    Intolerances        Vital Signs Last 24 Hrs  T(C): 37.1, Max: 37.1 (04-09 @ 07:21)  T(F): 98.8, Max: 98.8 (04-09 @ 07:21)  HR: 72 (72 - 75)  BP: 150/61 (125/68 - 150/61)  BP(mean): --  RR: 18 (18 - 18)  SpO2: 94% (94% - 98%)     ON PE:  General: alert and awake  Abdomen: soft, nt, nd, bs+, no masses       LABS:                        10.4   8.1   )-----------( 146      ( 09 Apr 2017 09:12 )             31.1     04-09    132<L>  |  99  |  15.0  ----------------------------<  113  3.5   |  23.0  |  0.77    Ca    8.2<L>      09 Apr 2017 09:12            RADIOLOGY & ADDITIONAL TESTS:

## 2017-04-09 NOTE — PROGRESS NOTE ADULT - ATTENDING COMMENTS
I am unimpressed with splenic infarct and even if present there is no indication for surgery. 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 He has ascites. He does not need surgery for any of these issues.

## 2017-04-09 NOTE — PROGRESS NOTE ADULT - SUBJECTIVE AND OBJECTIVE BOX
SUBJECTIVE:  Pt seen and examined in the am. No overnight events. Pt complains of diarrhea. C diff +. Denies F/C/N/V.       MEDICATIONS  (STANDING):  ceFAZolin   IVPB 2000milliGRAM(s) IV Intermittent <User Schedule>  insulin lispro (HumaLOG) corrective regimen sliding scale  SubCutaneous three times a day before meals  dextrose 50% Injectable 12.5Gram(s) IV Push once  dextrose 50% Injectable 25Gram(s) IV Push once  dextrose 50% Injectable 25Gram(s) IV Push once  folic acid 1milliGRAM(s) Oral daily  multivitamin 1Tablet(s) Oral daily  lisinopril 40milliGRAM(s) Oral daily  tamsulosin 0.4milliGRAM(s) Oral at bedtime  vancomycin    Solution 125milliGRAM(s) Oral every 6 hours  enoxaparin Injectable 40milliGRAM(s) SubCutaneous daily    MEDICATIONS  (PRN):  dextrose Gel 1Dose(s) Oral once PRN Blood Glucose LESS THAN 70 milliGRAM(s)/deciliter  glucagon  Injectable 1milliGRAM(s) IntraMuscular once PRN Glucose LESS THAN 70 milligrams/deciliter  ondansetron Injectable 4milliGRAM(s) IV Push every 6 hours PRN Nausea and/or Vomiting  acetaminophen   Tablet. 650milliGRAM(s) Oral every 6 hours PRN Mild Pain (1 - 3)  oxyCODONE  5 mG/acetaminophen 325 mG 1Tablet(s) Oral every 8 hours PRN Severe Pain (7 - 10)      Vital Signs Last 24 Hrs  T(C): 37.1, Max: 37.1 (04-09 @ 07:21)  T(F): 98.8, Max: 98.8 (04-09 @ 07:21)  HR: 75 (75 - 78)  BP: 125/68 (125/68 - 160/65)  BP(mean): --  RR: 18 (18 - 18)  SpO2: 98% (95% - 98%)    PHYSICAL EXAM:      GENERAL: NAD, A&OX3  HEENT: PERRL, +EOMI  NECK: soft, Supple, No JVD,   CHEST/LUNG: Clear to percussion bilaterally; No wheezing  HEART: S1S2+, Regular rate and rhythm; +3/6 systolic murmur  ABDOMEN: Soft, Nontender, Nondistended; Bowel sounds present  EXTREMITIES:  improving edema and erythema of left leg  SKIN: warm and dry          I&O's Detail    I & Os for current day (as of 09 Apr 2017 07:53)  =============================================  IN:    Solution: 100 ml    Total IN: 100 ml  ---------------------------------------------  OUT:    Indwelling Catheter - Urethral: 1350 ml    Total OUT: 1350 ml  ---------------------------------------------  Total NET: -1250 ml      LABS:                        10.3   7.0   )-----------( 139      ( 08 Apr 2017 15:59 )             30.7     04-08    137  |  101  |  14.0  ----------------------------<  111  3.6   |  25.0  |  0.62    Ca    8.1<L>      08 Apr 2017 15:59  Mg     1.5     04-07            RADIOLOGY & ADDITIONAL STUDIES:

## 2017-04-09 NOTE — PROGRESS NOTE ADULT - PROBLEM SELECTOR PLAN 4
CT abd/pelvis showed possible developing infarct. Currently patients abdominal exam is benign.  -Surgery consult noted and appreciated and no surgical intervention indicated at this time. Benign abdominal exam.

## 2017-04-10 LAB
ANION GAP SERPL CALC-SCNC: 10 MMOL/L — SIGNIFICANT CHANGE UP (ref 5–17)
BASOPHILS # BLD AUTO: 0 K/UL — SIGNIFICANT CHANGE UP (ref 0–0.2)
BASOPHILS NFR BLD AUTO: 0.3 % — SIGNIFICANT CHANGE UP (ref 0–2)
BUN SERPL-MCNC: 14 MG/DL — SIGNIFICANT CHANGE UP (ref 8–20)
CALCIUM SERPL-MCNC: 8.2 MG/DL — LOW (ref 8.6–10.2)
CHLORIDE SERPL-SCNC: 101 MMOL/L — SIGNIFICANT CHANGE UP (ref 98–107)
CO2 SERPL-SCNC: 24 MMOL/L — SIGNIFICANT CHANGE UP (ref 22–29)
CREAT SERPL-MCNC: 0.77 MG/DL — SIGNIFICANT CHANGE UP (ref 0.5–1.3)
CULTURE RESULTS: SIGNIFICANT CHANGE UP
CULTURE RESULTS: SIGNIFICANT CHANGE UP
EOSINOPHIL # BLD AUTO: 0.1 K/UL — SIGNIFICANT CHANGE UP (ref 0–0.5)
EOSINOPHIL NFR BLD AUTO: 0.8 % — SIGNIFICANT CHANGE UP (ref 0–5)
GLUCOSE SERPL-MCNC: 111 MG/DL — SIGNIFICANT CHANGE UP (ref 70–115)
HCT VFR BLD CALC: 30.4 % — LOW (ref 42–52)
HGB BLD-MCNC: 10.1 G/DL — LOW (ref 14–18)
LYMPHOCYTES # BLD AUTO: 0.9 K/UL — LOW (ref 1–4.8)
LYMPHOCYTES # BLD AUTO: 12.5 % — LOW (ref 20–55)
MCHC RBC-ENTMCNC: 32.9 PG — HIGH (ref 27–31)
MCHC RBC-ENTMCNC: 33.2 G/DL — SIGNIFICANT CHANGE UP (ref 32–36)
MCV RBC AUTO: 99 FL — HIGH (ref 80–94)
MONOCYTES # BLD AUTO: 0.8 K/UL — SIGNIFICANT CHANGE UP (ref 0–0.8)
MONOCYTES NFR BLD AUTO: 11.5 % — HIGH (ref 3–10)
NEUTROPHILS # BLD AUTO: 5.5 K/UL — SIGNIFICANT CHANGE UP (ref 1.8–8)
NEUTROPHILS NFR BLD AUTO: 74.6 % — HIGH (ref 37–73)
PLATELET # BLD AUTO: 149 K/UL — LOW (ref 150–400)
POTASSIUM SERPL-MCNC: 3.8 MMOL/L — SIGNIFICANT CHANGE UP (ref 3.5–5.3)
POTASSIUM SERPL-SCNC: 3.8 MMOL/L — SIGNIFICANT CHANGE UP (ref 3.5–5.3)
RBC # BLD: 3.07 M/UL — LOW (ref 4.6–6.2)
RBC # FLD: 13.7 % — SIGNIFICANT CHANGE UP (ref 11–15.6)
SODIUM SERPL-SCNC: 135 MMOL/L — SIGNIFICANT CHANGE UP (ref 135–145)
SPECIMEN SOURCE: SIGNIFICANT CHANGE UP
SPECIMEN SOURCE: SIGNIFICANT CHANGE UP
WBC # BLD: 7.4 K/UL — SIGNIFICANT CHANGE UP (ref 4.8–10.8)
WBC # FLD AUTO: 7.4 K/UL — SIGNIFICANT CHANGE UP (ref 4.8–10.8)

## 2017-04-10 PROCEDURE — 99233 SBSQ HOSP IP/OBS HIGH 50: CPT

## 2017-04-10 RX ADMIN — TAMSULOSIN HYDROCHLORIDE 0.4 MILLIGRAM(S): 0.4 CAPSULE ORAL at 21:00

## 2017-04-10 RX ADMIN — Medication 100 MILLIGRAM(S): at 05:13

## 2017-04-10 RX ADMIN — Medication 100 MILLIGRAM(S): at 21:00

## 2017-04-10 RX ADMIN — Medication 250 MILLIGRAM(S): at 05:12

## 2017-04-10 RX ADMIN — Medication 125 MILLIGRAM(S): at 05:14

## 2017-04-10 RX ADMIN — Medication 125 MILLIGRAM(S): at 00:15

## 2017-04-10 RX ADMIN — Medication 250 MILLIGRAM(S): at 17:00

## 2017-04-10 RX ADMIN — ENOXAPARIN SODIUM 40 MILLIGRAM(S): 100 INJECTION SUBCUTANEOUS at 12:39

## 2017-04-10 RX ADMIN — Medication 1 MILLIGRAM(S): at 12:40

## 2017-04-10 RX ADMIN — Medication 125 MILLIGRAM(S): at 14:24

## 2017-04-10 RX ADMIN — Medication 125 MILLIGRAM(S): at 21:00

## 2017-04-10 RX ADMIN — Medication 100 MILLIGRAM(S): at 12:40

## 2017-04-10 RX ADMIN — Medication 1 TABLET(S): at 12:40

## 2017-04-10 RX ADMIN — LISINOPRIL 40 MILLIGRAM(S): 2.5 TABLET ORAL at 05:12

## 2017-04-10 NOTE — PROGRESS NOTE ADULT - PROBLEM SELECTOR PLAN 4
-CT abd/pelvis showed possible developing infarct. Currently patients abdominal exam is benign.  -Surgery f/u noted and appreciated and no surgical intervention indicated at this time.

## 2017-04-10 NOTE — PROGRESS NOTE ADULT - SUBJECTIVE AND OBJECTIVE BOX
Patient seen and examined. Being followed for a septic left knee I&D on 3/31/17 POD #10. reports ongoing improved pain and ROM of the left knee. He is being followed for possible prostate abscess.       PAST MEDICAL & SURGICAL HISTORY:  PAST MEDICAL & SURGICAL HISTORY:  DM (diabetes mellitus)  No significant past surgical history      Allergies: No Known Allergies                            10.1   7.4   )-----------( 149      ( 10 Apr 2017 07:36 )             30.4       PHYSICAL EXAM:    Vital Signs Last 24 Hrs  T(C): 36.8, Max: 37.1 (04-09 @ 16:44)  T(F): 98.3, Max: 98.8 (04-09 @ 16:44)  HR: 75 (71 - 75)  BP: 136/63 (123/59 - 150/61)  BP(mean): --  RR: 18 (18 - 18)  SpO2: 95% (94% - 96%)    Appearance: Alert, responsive, in no acute distress.    Neurological: Sensation is grossly intact to light touch. 5/5 motor function of all extremities. No focal deficits or weaknesses found.    Skin: LEFT knee:  no rash on visible skin. Dressing changed, incision C/D/I, + surrounding erythema distally. No drainage/discharge, no purulence, No ulcerations. No discharge.    Vascular: 2+ distal pulses. Cap refill < 2 sec. No signs of venous insuffiencey or stasis. No extremity ulcerations. No cyanosis.    Musculoskeletal:         Left Lower Extremity: very minimal pain with active/passive left knee ROM. No calf tenderness. No bony tenderness.         A/P:  Pt is a  81y Male s/p I&D 3/31/17 of a left septic knee.     PLAN:   * WBAT with PT  * Continue ABX per ID  * office follow-up in 1-2 weeks from discharge  * DVTP   Continue care with primary team

## 2017-04-10 NOTE — PROGRESS NOTE ADULT - ASSESSMENT
81 y.o. M with PMH DM2, HTN, alcoholic cirrhosis, basal cell carcinoma s/p Moh's presents from PMD office with +MSSA left knee arthrocentesis S/P I&D of left knee 3/31/17. MSSA bacteremia positive Bcx 3/29/17 and 4/2/17 and repeat Bcx4/5/17 and 4/6/17  showing no growth thus far, with negative TTE/MICAELA for vegetations. Patient also developed C. Diff and was found to have colitis on the ct abd/pelvis, as well as a prostate abscess. Incidental finding of developing splenic infarct observed on ct abd/pelvis on 4/5/17 for which surgery recommends no intervention at this time.

## 2017-04-10 NOTE — PROGRESS NOTE ADULT - PROBLEM SELECTOR PROBLEM 5
C. difficile diarrhea
DM (diabetes mellitus)
Essential hypertension
Essential hypertension
Urinary retention
Urinary retention
DM (diabetes mellitus)
Essential hypertension

## 2017-04-10 NOTE — PROGRESS NOTE ADULT - PROBLEM SELECTOR PLAN 5
-Pt reports improvement in diarrhea.   -CT abd/pelvis showing acute colitis.  -C/w vancomycin 125mg q6 hrs D# 6/14 days as per ID  -ID f/u noted and appreciated   -c/w florastor 250mg po bid
-Pt reports multiple stools throughout the day.   -CT abd/pelvis showing acute colitis.  -C/w vancomycin 125mg q6 hrs D# 5/14 days as per ID  -ID f/u noted and appreciated   -c/w florastor 250mg po bid
-Pt reports two to three loose stools overnight into this morning.    -CT abd/pelvis showing acute colitis.  -C/w vancomycin 125mg q6 hrs
Failed TOV  Now with sneed  C/w flomax
Failed TOV  Now with sneed  C/w flomax
Hgb A1C 6.9  C/w insulin sliding scale.
Hgb A1C 6.9  C/w insulin sliding scale.
Normotensive  C/w lisinopril 40mg qd.
Normotensive  C/w lisinopril 40mg qd.
- continue medical management
Hypertensive.  Increase lisinopril 40mg qd.

## 2017-04-10 NOTE — PROGRESS NOTE ADULT - ASSESSMENT
80 y/o M with splenic infarct, C Diff + .    No surgical intervention required at this time.   Cont diet  cont abx   surgery is signing off, please reconsult prn

## 2017-04-10 NOTE — PROGRESS NOTE ADULT - PROBLEM SELECTOR PLAN 3
As per CT scan - fluid collection in left prostate 2.7 x 2cm.   -PSA elevated   -d/w ID and will repeat PSA in am   -Pt has h/o multiple prostate biopsies in past  -urology f/u noted and appreciated and recommend that the pt will likely need a repeat  imaging study and if drainage Is needed would be completed when C Difficil colitis resolves.

## 2017-04-10 NOTE — PROGRESS NOTE ADULT - SUBJECTIVE AND OBJECTIVE BOX
SUBJECTIVE:  Pt seen and examined in the am. No overnight events. no complaints of abdominal pain.      MEDICATIONS  (STANDING):  ceFAZolin   IVPB 2000milliGRAM(s) IV Intermittent <User Schedule>  insulin lispro (HumaLOG) corrective regimen sliding scale  SubCutaneous three times a day before meals  dextrose 50% Injectable 12.5Gram(s) IV Push once  dextrose 50% Injectable 25Gram(s) IV Push once  dextrose 50% Injectable 25Gram(s) IV Push once  folic acid 1milliGRAM(s) Oral daily  multivitamin 1Tablet(s) Oral daily  lisinopril 40milliGRAM(s) Oral daily  tamsulosin 0.4milliGRAM(s) Oral at bedtime  vancomycin    Solution 125milliGRAM(s) Oral every 6 hours  enoxaparin Injectable 40milliGRAM(s) SubCutaneous daily    MEDICATIONS  (PRN):  dextrose Gel 1Dose(s) Oral once PRN Blood Glucose LESS THAN 70 milliGRAM(s)/deciliter  glucagon  Injectable 1milliGRAM(s) IntraMuscular once PRN Glucose LESS THAN 70 milligrams/deciliter  ondansetron Injectable 4milliGRAM(s) IV Push every 6 hours PRN Nausea and/or Vomiting  acetaminophen   Tablet. 650milliGRAM(s) Oral every 6 hours PRN Mild Pain (1 - 3)  oxyCODONE  5 mG/acetaminophen 325 mG 1Tablet(s) Oral every 8 hours PRN Severe Pain (7 - 10)      Vital Signs Last 24 Hrs  T(C): 37.1, Max: 37.1 (04-09 @ 07:21)  T(F): 98.8, Max: 98.8 (04-09 @ 07:21)  HR: 75 (75 - 78)  BP: 125/68 (125/68 - 160/65)  BP(mean): --  RR: 18 (18 - 18)  SpO2: 98% (95% - 98%)    PHYSICAL EXAM:      GENERAL: NAD, A&OX3  HEENT: PERRL, +EOMI  NECK: soft, Supple, No JVD,   CHEST/LUNG: Clear to percussion bilaterally; No wheezing  HEART: S1S2+, Regular rate and rhythm; +3/6 systolic murmur  ABDOMEN: Soft, Nontender, Nondistended; Bowel sounds present  EXTREMITIES:  improving edema and erythema of left leg  SKIN: warm and dry          I&O's Detail    I & Os for current day (as of 09 Apr 2017 07:53)  =============================================  IN:    Solution: 100 ml    Total IN: 100 ml  ---------------------------------------------  OUT:    Indwelling Catheter - Urethral: 1350 ml    Total OUT: 1350 ml  ---------------------------------------------  Total NET: -1250 ml      LABS:                        10.3   7.0   )-----------( 139      ( 08 Apr 2017 15:59 )             30.7     04-08    137  |  101  |  14.0  ----------------------------<  111  3.6   |  25.0  |  0.62    Ca    8.1<L>      08 Apr 2017 15:59  Mg     1.5     04-07            RADIOLOGY & ADDITIONAL STUDIES:

## 2017-04-10 NOTE — PROGRESS NOTE ADULT - SUBJECTIVE AND OBJECTIVE BOX
Patient is a 81y old  Male who presents with a chief complaint of knee pain (29 Mar 2017 18:20)      HEALTH ISSUES - PROBLEM Dx:  Prophylactic measure: Prophylactic measure  Splenic infarct: Splenic infarct  C. difficile diarrhea: C. 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 No acute events overnight. Patient states he is feeling well, reports improvement in his diarrhea. Patient denies chest pain, SOB, abd pain, N/V, fever, chills, dysuria or any other complaints. All remainder ROS negative.     Vital Signs Last 24 Hrs  T(C): 36.4, Max: 36.8 (04-10 @ 00:11)  T(F): 97.5, Max: 98.3 (04-10 @ 00:11)  HR: 77 (71 - 77)  BP: 133/64 (123/59 - 136/63)  BP(mean): --  RR: 18 (18 - 18)  SpO2: 98% (95% - 98%)    CAPILLARY BLOOD GLUCOSE  114 (10 Apr 2017 17:00)  144 (10 Apr 2017 12:44)  105 (10 Apr 2017 07:48)      I&O's Summary  I & Os for 24h ending 10 Apr 2017 07:00  =============================================  IN: 100 ml / OUT: 0 ml / NET: 100 ml    I & Os for current day (as of 10 Apr 2017 21:47)  =============================================  IN: 800 ml / OUT: 1000 ml / NET: -200 ml      CONSTITUTIONAL: Well appearing, well nourished, awake, alert and in no apparent distress  CARDIAC: Normal rate, regular rhythm.  Heart sounds S1, S2.  No murmurs, rubs or gallops   RESPIRATORY: Breath sounds clear and equal bilaterally. No wheezes, rhales or rhonchi  GASTROENTEROLOGY: Soft, NT ND BS +  EXTREMITIES: No edema, cyanosis or deformity, left knee swollen with dressing C/DI  NEUROLOGICAL: Alert and oriented, no focal deficits, no motor or sensory deficits.  SKIN: No rash or lesions    MEDICATIONS  (STANDING):  ceFAZolin   IVPB 2000milliGRAM(s) IV Intermittent <User Schedule>  insulin lispro (HumaLOG) corrective regimen sliding scale  SubCutaneous three times a day before meals  dextrose 50% Injectable 12.5Gram(s) IV Push once  dextrose 50% Injectable 25Gram(s) IV Push once  dextrose 50% Injectable 25Gram(s) IV Push once  folic acid 1milliGRAM(s) Oral daily  multivitamin 1Tablet(s) Oral daily  lisinopril 40milliGRAM(s) Oral daily  tamsulosin 0.4milliGRAM(s) Oral at bedtime  enoxaparin Injectable 40milliGRAM(s) SubCutaneous daily  vancomycin    Solution 125milliGRAM(s) Oral every 6 hours  saccharomyces boulardii 250milliGRAM(s) Oral two times a day    MEDICATIONS  (PRN):  dextrose Gel 1Dose(s) Oral once PRN Blood Glucose LESS THAN 70 milliGRAM(s)/deciliter  glucagon  Injectable 1milliGRAM(s) IntraMuscular once PRN Glucose LESS THAN 70 milligrams/deciliter  ondansetron Injectable 4milliGRAM(s) IV Push every 6 hours PRN Nausea and/or Vomiting  acetaminophen   Tablet. 650milliGRAM(s) Oral every 6 hours PRN Mild Pain (1 - 3)  oxyCODONE  5 mG/acetaminophen 325 mG 1Tablet(s) Oral every 8 hours PRN Severe Pain (7 - 10)      LABS:                        10.1   7.4   )-----------( 149      ( 10 Apr 2017 07:36 )             30.4     04-10    135  |  101  |  14.0  ----------------------------<  111  3.8   |  24.0  |  0.77    Ca    8.2<L>      10 Apr 2017 07:36        RADIOLOGY & ADDITIONAL TESTS:  No new imaging studies.

## 2017-04-10 NOTE — PROGRESS NOTE ADULT - PROBLEM SELECTOR PLAN 6
-c/w sneed catheter   -C/w flomax 0.4mg po qhs
C/w MVI, folic acid, thiamine.
C/w MVI, folic acid, thiamine.
Normotensive  C/w lisinopril 40mg qd.
Normotensive  C/w lisinopril 40mg qd.

## 2017-04-10 NOTE — PROGRESS NOTE ADULT - PROBLEM SELECTOR PROBLEM 6
Alcohol use
Alcohol use
Essential hypertension
Urinary retention
Essential hypertension

## 2017-04-10 NOTE — PROGRESS NOTE ADULT - SUBJECTIVE AND OBJECTIVE BOX
INTERVAL HPI/OVERNIGHT EVENTS: Patient with complaints of diarrhea in bed.  C. dif positive.    MEDICATIONS  (STANDING):  ceFAZolin   IVPB 2000milliGRAM(s) IV Intermittent <User Schedule>  insulin lispro (HumaLOG) corrective regimen sliding scale  SubCutaneous three times a day before meals  dextrose 50% Injectable 12.5Gram(s) IV Push once  dextrose 50% Injectable 25Gram(s) IV Push once  dextrose 50% Injectable 25Gram(s) IV Push once  folic acid 1milliGRAM(s) Oral daily  multivitamin 1Tablet(s) Oral daily  lisinopril 40milliGRAM(s) Oral daily  tamsulosin 0.4milliGRAM(s) Oral at bedtime  enoxaparin Injectable 40milliGRAM(s) SubCutaneous daily  vancomycin    Solution 125milliGRAM(s) Oral every 6 hours  saccharomyces boulardii 250milliGRAM(s) Oral two times a day    MEDICATIONS  (PRN):  dextrose Gel 1Dose(s) Oral once PRN Blood Glucose LESS THAN 70 milliGRAM(s)/deciliter  glucagon  Injectable 1milliGRAM(s) IntraMuscular once PRN Glucose LESS THAN 70 milligrams/deciliter  ondansetron Injectable 4milliGRAM(s) IV Push every 6 hours PRN Nausea and/or Vomiting  acetaminophen   Tablet. 650milliGRAM(s) Oral every 6 hours PRN Mild Pain (1 - 3)  oxyCODONE  5 mG/acetaminophen 325 mG 1Tablet(s) Oral every 8 hours PRN Severe Pain (7 - 10)      Allergies    No Known Allergies    Intolerances        Vital Signs Last 24 Hrs  T(C): 36.8, Max: 37.1 (04-09 @ 16:44)  T(F): 98.3, Max: 98.8 (04-09 @ 16:44)  HR: 75 (71 - 75)  BP: 136/63 (123/59 - 150/61)  BP(mean): --  RR: 18 (18 - 18)  SpO2: 95% (94% - 96%)    ROS:  Constitutional: No fever, weight loss or fatigue  Respiratory: No cough, wheezing, chills or hemoptysis  Cardiovascular: No chest pain, palpitations, shortness of breath, dizziness or leg swelling  Genitourinary: No dysuria, frequency, hematuria or incontinence  Skin: No itching, burning, rashes or lesions   Musculoskeletal: No joint pain or swelling; No muscle, back or extremity pain  Psychiatric: No depression, anxiety, mood swings or difficulty sleeping  Allergy and Immunologic: No hives or eczema     ON PE:  General: Well developed; well nourished; in no acute distress  Head: Normocephalic; atraumatic  Respiratory: No tachypnea  Gastrointestinal: Soft non-tender non-distended;  Genitourinary: No costovertebral angle tenderness.  Urinary bladder is clinically not distended  Nunez catheter draining clear.  Extremities: Normal range of motion, No edema  Neurological: Alert and oriented x4  Skin: Warm and dry. No acute rash  Musculoskeletal: Normal gait, tone, without deformities  Psychiatric: Cooperative and appropriate      LABS:                        10.1   7.4   )-----------( 149      ( 10 Apr 2017 07:36 )             30.4     04-10    135  |  101  |  14.0  ----------------------------<  111  3.8   |  24.0  |  0.77    Ca    8.2<L>      10 Apr 2017 07:36            RADIOLOGY & ADDITIONAL TESTS:

## 2017-04-10 NOTE — PROGRESS NOTE ADULT - PROBLEM SELECTOR PLAN 8
Hgb A1C 6.9  -FS stable   -c/w accuchecks ACHS    -C/w insulin sliding scale.
Normotensive  C/w lisinopril 40mg qd.
Normotensive  C/w lisinopril 40mg qd.

## 2017-04-10 NOTE — PROGRESS NOTE ADULT - PROBLEM SELECTOR PLAN 7
-C/w MVI, folic acid, thiamine.
Hgb A1C 6.9  C/w insulin sliding scale.
Hgb A1C 6.9  C/w insulin sliding scale.

## 2017-04-10 NOTE — PROGRESS NOTE ADULT - PROBLEM SELECTOR PROBLEM 8
DM (diabetes mellitus)
Essential hypertension
Essential hypertension

## 2017-04-10 NOTE — PROGRESS NOTE ADULT - PROBLEM SELECTOR PLAN 2
Not definitive.  However, will likely need repeat study.  If drainage is needed then will consider waiting until his C. dif is under control.  D/W ID.

## 2017-04-10 NOTE — PROGRESS NOTE ADULT - PROBLEM SELECTOR PLAN 9
-Normotensive  -C/w lisinopril 40mg qd.

## 2017-04-10 NOTE — PROGRESS NOTE ADULT - ATTENDING COMMENTS
I am unimpressed with splenic infarct and even if present there is no indication for surgery. 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 He has ascites. He does not need surgery for any of these issues. Abd is soft.  will sign off, please call with questions.

## 2017-04-11 VITALS
SYSTOLIC BLOOD PRESSURE: 128 MMHG | HEART RATE: 76 BPM | OXYGEN SATURATION: 98 % | RESPIRATION RATE: 18 BRPM | DIASTOLIC BLOOD PRESSURE: 56 MMHG

## 2017-04-11 LAB
ANION GAP SERPL CALC-SCNC: 11 MMOL/L — SIGNIFICANT CHANGE UP (ref 5–17)
BASOPHILS # BLD AUTO: 0 K/UL — SIGNIFICANT CHANGE UP (ref 0–0.2)
BASOPHILS NFR BLD AUTO: 0.3 % — SIGNIFICANT CHANGE UP (ref 0–2)
BUN SERPL-MCNC: 13 MG/DL — SIGNIFICANT CHANGE UP (ref 8–20)
CALCIUM SERPL-MCNC: 8 MG/DL — LOW (ref 8.6–10.2)
CHLORIDE SERPL-SCNC: 100 MMOL/L — SIGNIFICANT CHANGE UP (ref 98–107)
CO2 SERPL-SCNC: 24 MMOL/L — SIGNIFICANT CHANGE UP (ref 22–29)
CREAT SERPL-MCNC: 0.75 MG/DL — SIGNIFICANT CHANGE UP (ref 0.5–1.3)
EOSINOPHIL # BLD AUTO: 0.1 K/UL — SIGNIFICANT CHANGE UP (ref 0–0.5)
EOSINOPHIL NFR BLD AUTO: 2 % — SIGNIFICANT CHANGE UP (ref 0–5)
GLUCOSE SERPL-MCNC: 110 MG/DL — SIGNIFICANT CHANGE UP (ref 70–115)
HCT VFR BLD CALC: 29.6 % — LOW (ref 42–52)
HGB BLD-MCNC: 10 G/DL — LOW (ref 14–18)
LYMPHOCYTES # BLD AUTO: 1.2 K/UL — SIGNIFICANT CHANGE UP (ref 1–4.8)
LYMPHOCYTES # BLD AUTO: 17.6 % — LOW (ref 20–55)
MCHC RBC-ENTMCNC: 33.1 PG — HIGH (ref 27–31)
MCHC RBC-ENTMCNC: 33.8 G/DL — SIGNIFICANT CHANGE UP (ref 32–36)
MCV RBC AUTO: 98 FL — HIGH (ref 80–94)
MONOCYTES # BLD AUTO: 0.8 K/UL — SIGNIFICANT CHANGE UP (ref 0–0.8)
MONOCYTES NFR BLD AUTO: 12.5 % — HIGH (ref 3–10)
NEUTROPHILS # BLD AUTO: 4.5 K/UL — SIGNIFICANT CHANGE UP (ref 1.8–8)
NEUTROPHILS NFR BLD AUTO: 67.1 % — SIGNIFICANT CHANGE UP (ref 37–73)
PLATELET # BLD AUTO: 150 K/UL — SIGNIFICANT CHANGE UP (ref 150–400)
POTASSIUM SERPL-MCNC: 3.1 MMOL/L — LOW (ref 3.5–5.3)
POTASSIUM SERPL-SCNC: 3.1 MMOL/L — LOW (ref 3.5–5.3)
PSA FLD-MCNC: 11.33 NG/ML — HIGH (ref 0–4)
PSA FREE FLD-MCNC: 27.3 % — SIGNIFICANT CHANGE UP
PSA FREE FLD-MCNC: 3.09 NG/ML — SIGNIFICANT CHANGE UP
PSA FREE MFR FLD: 11.33 NG/ML — HIGH (ref 0–4)
RBC # BLD: 3.02 M/UL — LOW (ref 4.6–6.2)
RBC # FLD: 13.7 % — SIGNIFICANT CHANGE UP (ref 11–15.6)
SODIUM SERPL-SCNC: 135 MMOL/L — SIGNIFICANT CHANGE UP (ref 135–145)
WBC # BLD: 6.6 K/UL — SIGNIFICANT CHANGE UP (ref 4.8–10.8)
WBC # FLD AUTO: 6.6 K/UL — SIGNIFICANT CHANGE UP (ref 4.8–10.8)

## 2017-04-11 PROCEDURE — 83605 ASSAY OF LACTIC ACID: CPT

## 2017-04-11 PROCEDURE — 89060 EXAM SYNOVIAL FLUID CRYSTALS: CPT

## 2017-04-11 PROCEDURE — 73552 X-RAY EXAM OF FEMUR 2/>: CPT

## 2017-04-11 PROCEDURE — 84154 ASSAY OF PSA FREE: CPT

## 2017-04-11 PROCEDURE — 72148 MRI LUMBAR SPINE W/O DYE: CPT

## 2017-04-11 PROCEDURE — 80076 HEPATIC FUNCTION PANEL: CPT

## 2017-04-11 PROCEDURE — 81001 URINALYSIS AUTO W/SCOPE: CPT

## 2017-04-11 PROCEDURE — 87015 SPECIMEN INFECT AGNT CONCNTJ: CPT

## 2017-04-11 PROCEDURE — 80053 COMPREHEN METABOLIC PANEL: CPT

## 2017-04-11 PROCEDURE — 83036 HEMOGLOBIN GLYCOSYLATED A1C: CPT

## 2017-04-11 PROCEDURE — 85730 THROMBOPLASTIN TIME PARTIAL: CPT

## 2017-04-11 PROCEDURE — 93005 ELECTROCARDIOGRAM TRACING: CPT

## 2017-04-11 PROCEDURE — 85610 PROTHROMBIN TIME: CPT

## 2017-04-11 PROCEDURE — 80202 ASSAY OF VANCOMYCIN: CPT

## 2017-04-11 PROCEDURE — 97163 PT EVAL HIGH COMPLEX 45 MIN: CPT

## 2017-04-11 PROCEDURE — 85027 COMPLETE CBC AUTOMATED: CPT

## 2017-04-11 PROCEDURE — 85652 RBC SED RATE AUTOMATED: CPT

## 2017-04-11 PROCEDURE — 93970 EXTREMITY STUDY: CPT

## 2017-04-11 PROCEDURE — 93306 TTE W/DOPPLER COMPLETE: CPT

## 2017-04-11 PROCEDURE — 99239 HOSP IP/OBS DSCHRG MGMT >30: CPT

## 2017-04-11 PROCEDURE — 93325 DOPPLER ECHO COLOR FLOW MAPG: CPT

## 2017-04-11 PROCEDURE — 71045 X-RAY EXAM CHEST 1 VIEW: CPT

## 2017-04-11 PROCEDURE — 84100 ASSAY OF PHOSPHORUS: CPT

## 2017-04-11 PROCEDURE — 99285 EMERGENCY DEPT VISIT HI MDM: CPT | Mod: 25

## 2017-04-11 PROCEDURE — 83550 IRON BINDING TEST: CPT

## 2017-04-11 PROCEDURE — 36415 COLL VENOUS BLD VENIPUNCTURE: CPT

## 2017-04-11 PROCEDURE — 93312 ECHO TRANSESOPHAGEAL: CPT

## 2017-04-11 PROCEDURE — 87493 C DIFF AMPLIFIED PROBE: CPT

## 2017-04-11 PROCEDURE — 96375 TX/PRO/DX INJ NEW DRUG ADDON: CPT

## 2017-04-11 PROCEDURE — 87086 URINE CULTURE/COLONY COUNT: CPT

## 2017-04-11 PROCEDURE — 97110 THERAPEUTIC EXERCISES: CPT

## 2017-04-11 PROCEDURE — 36569 INSJ PICC 5 YR+ W/O IMAGING: CPT

## 2017-04-11 PROCEDURE — 86900 BLOOD TYPING SEROLOGIC ABO: CPT

## 2017-04-11 PROCEDURE — 87186 SC STD MICRODIL/AGAR DIL: CPT

## 2017-04-11 PROCEDURE — 87116 MYCOBACTERIA CULTURE: CPT

## 2017-04-11 PROCEDURE — 96374 THER/PROPH/DIAG INJ IV PUSH: CPT

## 2017-04-11 PROCEDURE — 86901 BLOOD TYPING SEROLOGIC RH(D): CPT

## 2017-04-11 PROCEDURE — 87206 SMEAR FLUORESCENT/ACID STAI: CPT

## 2017-04-11 PROCEDURE — 82728 ASSAY OF FERRITIN: CPT

## 2017-04-11 PROCEDURE — 96376 TX/PRO/DX INJ SAME DRUG ADON: CPT

## 2017-04-11 PROCEDURE — 71010: CPT | Mod: 26

## 2017-04-11 PROCEDURE — 82607 VITAMIN B-12: CPT

## 2017-04-11 PROCEDURE — 87075 CULTR BACTERIA EXCEPT BLOOD: CPT

## 2017-04-11 PROCEDURE — 84153 ASSAY OF PSA TOTAL: CPT

## 2017-04-11 PROCEDURE — 86140 C-REACTIVE PROTEIN: CPT

## 2017-04-11 PROCEDURE — 84466 ASSAY OF TRANSFERRIN: CPT

## 2017-04-11 PROCEDURE — 86850 RBC ANTIBODY SCREEN: CPT

## 2017-04-11 PROCEDURE — 82746 ASSAY OF FOLIC ACID SERUM: CPT

## 2017-04-11 PROCEDURE — G0103: CPT

## 2017-04-11 PROCEDURE — 83615 LACTATE (LD) (LDH) ENZYME: CPT

## 2017-04-11 PROCEDURE — 87205 SMEAR GRAM STAIN: CPT

## 2017-04-11 PROCEDURE — 82009 KETONE BODYS QUAL: CPT

## 2017-04-11 PROCEDURE — 87070 CULTURE OTHR SPECIMN AEROBIC: CPT

## 2017-04-11 PROCEDURE — 74177 CT ABD & PELVIS W/CONTRAST: CPT

## 2017-04-11 PROCEDURE — 99232 SBSQ HOSP IP/OBS MODERATE 35: CPT

## 2017-04-11 PROCEDURE — 73562 X-RAY EXAM OF KNEE 3: CPT

## 2017-04-11 PROCEDURE — 87102 FUNGUS ISOLATION CULTURE: CPT

## 2017-04-11 PROCEDURE — 72157 MRI CHEST SPINE W/O & W/DYE: CPT

## 2017-04-11 PROCEDURE — 80048 BASIC METABOLIC PNL TOTAL CA: CPT

## 2017-04-11 PROCEDURE — 83735 ASSAY OF MAGNESIUM: CPT

## 2017-04-11 PROCEDURE — 87040 BLOOD CULTURE FOR BACTERIA: CPT

## 2017-04-11 PROCEDURE — 89051 BODY FLUID CELL COUNT: CPT

## 2017-04-11 PROCEDURE — 97116 GAIT TRAINING THERAPY: CPT

## 2017-04-11 RX ORDER — SACCHAROMYCES BOULARDII 250 MG
1 POWDER IN PACKET (EA) ORAL
Qty: 60 | Refills: 0 | OUTPATIENT
Start: 2017-04-11 | End: 2017-05-11

## 2017-04-11 RX ORDER — OXYCODONE HYDROCHLORIDE 5 MG/1
1 TABLET ORAL
Qty: 0 | Refills: 0 | COMMUNITY
Start: 2017-04-11

## 2017-04-11 RX ORDER — LISINOPRIL 2.5 MG/1
1 TABLET ORAL
Qty: 30 | Refills: 0 | OUTPATIENT
Start: 2017-04-11 | End: 2017-05-11

## 2017-04-11 RX ORDER — VANCOMYCIN HCL 1 G
2.5 VIAL (EA) INTRAVENOUS
Qty: 0 | Refills: 0 | COMMUNITY
Start: 2017-04-11

## 2017-04-11 RX ORDER — RAMIPRIL 5 MG
1 CAPSULE ORAL
Qty: 0 | Refills: 0 | COMMUNITY

## 2017-04-11 RX ORDER — TAMSULOSIN HYDROCHLORIDE 0.4 MG/1
1 CAPSULE ORAL
Qty: 30 | Refills: 0 | OUTPATIENT
Start: 2017-04-11 | End: 2017-05-11

## 2017-04-11 RX ORDER — FOLIC ACID 0.8 MG
1 TABLET ORAL
Qty: 30 | Refills: 0 | OUTPATIENT
Start: 2017-04-11 | End: 2017-05-11

## 2017-04-11 RX ORDER — ENOXAPARIN SODIUM 100 MG/ML
50 INJECTION SUBCUTANEOUS
Qty: 0 | Refills: 0 | COMMUNITY

## 2017-04-11 RX ORDER — INSULIN LISPRO 100/ML
0 VIAL (ML) SUBCUTANEOUS
Qty: 0 | Refills: 0 | COMMUNITY
Start: 2017-04-11

## 2017-04-11 RX ORDER — ACETAMINOPHEN 500 MG
2 TABLET ORAL
Qty: 0 | Refills: 0 | COMMUNITY
Start: 2017-04-11

## 2017-04-11 RX ORDER — CEFAZOLIN SODIUM 1 G
2000 VIAL (EA) INJECTION
Qty: 0 | Refills: 0 | COMMUNITY
Start: 2017-04-11

## 2017-04-11 RX ORDER — POTASSIUM CHLORIDE 20 MEQ
40 PACKET (EA) ORAL ONCE
Qty: 0 | Refills: 0 | Status: COMPLETED | OUTPATIENT
Start: 2017-04-11 | End: 2017-04-11

## 2017-04-11 RX ADMIN — Medication 650 MILLIGRAM(S): at 04:55

## 2017-04-11 RX ADMIN — Medication 125 MILLIGRAM(S): at 05:21

## 2017-04-11 RX ADMIN — Medication 650 MILLIGRAM(S): at 04:08

## 2017-04-11 RX ADMIN — Medication 100 MILLIGRAM(S): at 12:11

## 2017-04-11 RX ADMIN — ENOXAPARIN SODIUM 40 MILLIGRAM(S): 100 INJECTION SUBCUTANEOUS at 12:11

## 2017-04-11 RX ADMIN — Medication 250 MILLIGRAM(S): at 05:21

## 2017-04-11 RX ADMIN — Medication 125 MILLIGRAM(S): at 00:18

## 2017-04-11 RX ADMIN — Medication 125 MILLIGRAM(S): at 16:43

## 2017-04-11 RX ADMIN — Medication 40 MILLIEQUIVALENT(S): at 08:34

## 2017-04-11 RX ADMIN — Medication 100 MILLIGRAM(S): at 05:21

## 2017-04-11 RX ADMIN — Medication 125 MILLIGRAM(S): at 12:12

## 2017-04-11 RX ADMIN — Medication 1 MILLIGRAM(S): at 12:11

## 2017-04-11 RX ADMIN — Medication 1 TABLET(S): at 12:11

## 2017-04-11 RX ADMIN — LISINOPRIL 40 MILLIGRAM(S): 2.5 TABLET ORAL at 05:21

## 2017-04-11 RX ADMIN — Medication 250 MILLIGRAM(S): at 16:43

## 2017-04-11 NOTE — PROGRESS NOTE ADULT - SUBJECTIVE AND OBJECTIVE BOX
INTERVAL HPI/OVERNIGHT EVENTS:  Resting comfortably.  No pain.  Nunez draining    MEDICATIONS  (STANDING):  ceFAZolin   IVPB 2000milliGRAM(s) IV Intermittent <User Schedule>  insulin lispro (HumaLOG) corrective regimen sliding scale  SubCutaneous three times a day before meals  dextrose 50% Injectable 12.5Gram(s) IV Push once  dextrose 50% Injectable 25Gram(s) IV Push once  dextrose 50% Injectable 25Gram(s) IV Push once  folic acid 1milliGRAM(s) Oral daily  multivitamin 1Tablet(s) Oral daily  lisinopril 40milliGRAM(s) Oral daily  tamsulosin 0.4milliGRAM(s) Oral at bedtime  enoxaparin Injectable 40milliGRAM(s) SubCutaneous daily  vancomycin    Solution 125milliGRAM(s) Oral every 6 hours  saccharomyces boulardii 250milliGRAM(s) Oral two times a day    MEDICATIONS  (PRN):  dextrose Gel 1Dose(s) Oral once PRN Blood Glucose LESS THAN 70 milliGRAM(s)/deciliter  glucagon  Injectable 1milliGRAM(s) IntraMuscular once PRN Glucose LESS THAN 70 milligrams/deciliter  ondansetron Injectable 4milliGRAM(s) IV Push every 6 hours PRN Nausea and/or Vomiting  acetaminophen   Tablet. 650milliGRAM(s) Oral every 6 hours PRN Mild Pain (1 - 3)  oxyCODONE  5 mG/acetaminophen 325 mG 1Tablet(s) Oral every 8 hours PRN Severe Pain (7 - 10)      Allergies    No Known Allergies    Intolerances        Vital Signs Last 24 Hrs  T(C): 36.9, Max: 37 (04-10 @ 23:40)  T(F): 98.5, Max: 98.6 (04-10 @ 23:40)  HR: 70 (70 - 77)  BP: 123/56 (123/56 - 133/64)  BP(mean): --  RR: 18 (17 - 18)  SpO2: 97% (97% - 98%)     ON PE:  General: alert and awake  Abdomen: soft, nt, nd, bs+       LABS:                        10.0   6.6   )-----------( 150      ( 11 Apr 2017 07:09 )             29.6     04-11    135  |  100  |  13.0  ----------------------------<  110  3.1<L>   |  24.0  |  0.75    Ca    8.0<L>      11 Apr 2017 07:09            RADIOLOGY & ADDITIONAL TESTS:

## 2017-04-11 NOTE — PROGRESS NOTE ADULT - SUBJECTIVE AND OBJECTIVE BOX
[facility]  INFECTIOUS DISEASES AND INTERNAL MEDICINE OF [place]  =======================================================  Ketan Ramesh MD  Diplomates American Board of Internal Medicine and Infectious Diseases  =======================================================    MAGED NOBLE     Follow up MSSA bacteremia, C Diff    No complaints.    Allergies:  No Known Allergies      Antibiotics:  ceFAZolin   IVPB 2000milliGRAM(s) IV Intermittent <User Schedule>  vancomycin    Solution 125milliGRAM(s) Oral every 6 hours       REVIEW OF SYSTEMS:  CONSTITUTIONAL:  No chills  HEENT:   No diplopia or blurred vision. No earache, sore throat or runny nose.  CARDIOVASCULAR:  No pressure, squeezing, strangling, tightness, heaviness or aching about the chest, neck, axilla or epigastrium.  RESPIRATORY:  No cough, shortness of breat  GASTROINTESTINAL:  No nausea, vomiting.  + diarrhea   GENITOURINARY:  No dysuria, frequency or urgency.   MUSCULOSKELETAL:  Left knee pain Improved  SKIN:  No change in skin, hair or nails.  NEUROLOGIC:  No paresthesias, fasciculations  PSYCHIATRIC:  No disorder of thought or mood.  ENDOCRINE:  No heat or cold intolerance, polyuria or polydipsia.  HEMATOLOGICAL:  No easy bruising or bleeding.       Physical Exam:  Vital Signs Last 24 Hrs  T(C): 36.9, Max: 37 (04-10 @ 23:40)  T(F): 98.5, Max: 98.6 (04-10 @ 23:40)  HR: 70 (70 - 77)  BP: 123/56 (123/56 - 133/64)  RR: 18 (17 - 18)  SpO2: 97% (97% - 98%)    GEN: NAD, pleasant,  HEENT: normocephalic and atraumatic. EOMI. PERRL.  NECK: Supple.   LUNGS: Clear to auscultation.  HEART: Regular rate and rhythm  ABDOMEN: Soft, nontender, and nondistended.  Positive bowel sounds.    : no CVA tenderness  EXTREMITIES: LLE knee decreased swelling, No edema   NEUROLOGIC: AAOx3  PSYCHIATRIC: Appropriate affect .  SKIN: Left knee with no more redness and warmth    Labs:  04-11    135  |  100  |  13.0  ----------------------------<  110  3.1<L>   |  24.0  |  0.75    Ca    8.0<L>      11 Apr 2017 07:09                 10.0   6.6   )-----------( 150      ( 11 Apr 2017 07:09 )             29.6           RECENT CULTURES:  04-08 .Blood Blood XXXX XXXX   No growth at 48 hours    04-06 .Blood Blood XXXX XXXX   No growth at 48 hours    04-06 .Blood Blood XXXX XXXX   No growth at 48 hours    04-05 .Blood Blood XXXX XXXX   No growth at 5 days.      MRI Thoracic/Lumbar Spine  IMPRESSION:     No evidence of discitis/osteomyelitis. No evidence of a   psoas muscle abscess.

## 2017-04-11 NOTE — DISCHARGE NOTE ADULT - MEDICATION SUMMARY - MEDICATIONS TO TAKE
I will START or STAY ON the medications listed below when I get home from the hospital:    acetaminophen-oxyCODONE 325 mg-5 mg oral tablet  -- 1 tab(s) by mouth every 8 hours, As needed, Severe Pain (7 - 10)  -- Indication: For knee pain    acetaminophen 325 mg oral tablet  -- 2 tab(s) by mouth every 6 hours, As needed, Mild Pain (1 - 3)  -- Indication: For knee pain     lisinopril 40 mg oral tablet  -- 1 tab(s) by mouth once a day  -- Indication: For Hypertension    tamsulosin 0.4 mg oral capsule  -- 1 cap(s) by mouth once a day (at bedtime)  -- Indication: For Urinary retention    insulin lispro 100 units/mL subcutaneous solution  --  subcutaneous 3 times a day (before meals); 2 Unit(s) if Glucose 151 - 200  4 Unit(s) if Glucose 201 - 250  6 Unit(s) if Glucose 251 - 300  8 Unit(s) if Glucose 301 - 350  10 Unit(s) if Glucose 351 - 400  12 Unit(s) if Glucose Greater Than 400  -- Indication: For Diabetes    ceFAZolin  -- 2000 milligram(s) intravenous 3 times a day    In the morning at 4:45am, 12:45pm and 20:45pm   To continue course of antibiotics until May 1st, 2017   -- Indication: For Septic Joint     vancomycin 250 mg/5 mL oral solution  -- 2.5 milliliter(s) by mouth every 6 hours x 7 days   -- Indication: For C. difficile diarrhea    saccharomyces boulardii lyo 250 mg oral capsule  -- 1 cap(s) by mouth 2 times a day  -- Indication: For Prophylaxis     Multiple Vitamins oral tablet  -- 1 tab(s) by mouth once a day  -- Indication: For Supplementation    folic acid 1 mg oral tablet  -- 1 tab(s) by mouth once a day  -- Indication: For Supplementation

## 2017-04-11 NOTE — PROGRESS NOTE ADULT - PROBLEM SELECTOR PROBLEM 4
Alcohol use
Alcohol use
Cellulitis of left lower extremity
DM (diabetes mellitus)
DM (diabetes mellitus)
R/O Prostatic abscess
Splenic infarct
Staphylococcal arthritis of left knee
Staphylococcal arthritis of left knee
Prostatic abscess
Cellulitis of left lower extremity
DM (diabetes mellitus)
R/O Prostatic abscess

## 2017-04-11 NOTE — DISCHARGE NOTE ADULT - CARE PLAN
Principal Discharge DX:	Staphylococcal arthritis of left knee  Secondary Diagnosis:	Bacteremia  Secondary Diagnosis:	C. difficile diarrhea  Secondary Diagnosis:	Splenic infarct  Secondary Diagnosis:	Prostate abscess  Secondary Diagnosis:	DM (diabetes mellitus)  Secondary Diagnosis:	Essential hypertension Principal Discharge DX:	Staphylococcal arthritis of left knee  Goal:	Prevent further infections.  Instructions for follow-up, activity and diet:	Please continue with current antibiotic Ancef until May 1st 2017. Patient to follow up with infectious disease specialist in 2-3 weeks and to follow up with orthopedic surgeon in 1-2 weeks. Please see phone number below to make an appointment.  Secondary Diagnosis:	Bacteremia  Goal:	Resolved  Secondary Diagnosis:	C. difficile diarrhea  Instructions for follow-up, activity and diet:	Continue with vancomycin orally as directed for 7 more days.  Secondary Diagnosis:	Splenic infarct  Instructions for follow-up, activity and diet:	Patient was evaluated by surgery and should follow up with repeat imaging in 2-3 weeks. Please make an appointment at the phone number below with the surgeon for further management and care. Dr. Boykin.  Secondary Diagnosis:	Prostate abscess  Instructions for follow-up, activity and diet:	Please follow up with Dr. Brasher urologist in 1-2 weeks for further management and repeat imaging, unclear if prostate abscess. Patient also has sneed catheter for urinary retention and is to continue with sneed catheter and follow up with urology for further management.  Secondary Diagnosis:	DM (diabetes mellitus)  Instructions for follow-up, activity and diet:	Continue with current medical management.  Secondary Diagnosis:	Essential hypertension  Instructions for follow-up, activity and diet:	Continue with current medical management.

## 2017-04-11 NOTE — PROGRESS NOTE ADULT - PROBLEM SELECTOR PLAN 1
Blood culture 3/29, 3/31, 4/2 - MSSA  Blood culture 4/5 - pending  TTE and MICAELA - no evidence of vegetation  C/w ancef
Blood culture 3/29, 3/31, 4/2 - MSSA  Blood culture 4/5 - pending  TTE and MICAELA - no evidence of vegetation  C/w ancef
Blood culture 3/29, 3/31, 4/2 - MSSA  Blood culture 4/5-4/6 shows no growth  TTE and MICAELA - no evidence of vegetation  -pt clinically afebrile  no leukocytosis  -C/w ancef 2G IVPB TID
Blood culture 3/29, 3/31, 4/2 - MSSA  Repeat Blood cultures on 4/5-4/6 shows no growth  TTE and MICAELA - no evidence of vegetation  -pt clinically afebrile  no leukocytosis  -C/w ancef 2G IVPB TID  -D/w Dr. Ramesh from ID and pt to have picc line placed   -PICC line ordered
Blood culture 3/29, 3/31, 4/2 - MSSA  Repeat Blood cultures on 4/5-4/6 shows no growth  TTE and MICAELA - no evidence of vegetation  -pt clinically afebrile  no leukocytosis  -C/w ancef 2G IVPB TID  -ID f/u noted and appreciated
Culture by MSSA.   ID c/s - c/w ancef  S/p knee arthrotomy with washout
Culture of synovial fluid from outside orthopedics office with MSSA  Reviewed outside labs  Continue Cefazolin 2gm IV [id number]  Follow up blood cultures and follow up   Orthopedics consult noted, OR for washout today  Follow up OR cultures  ESR 34 and CRP 15
Culture of synovial fluid from outside orthopedics office with MSSA  Reviewed outside labs  Continue Cefazolin 2gm IV [id number]
No surgical intervention required at this time.   Cont diet  cont abx   surgery is signing off, please reconsult prn
Patient reports difficulty urinating.  16 Fr sneed placed.  250 PVR.  Continue flomax.
S/P Left Knee arthroplasty 3/31/17  OR cultures with MSSA  Culture of synovial fluid from outside orthopedics office with MSSA  Reviewed outside labs  Continue Cefazolin 2gm IV [id number]  Follow up OR cultures  ESR 34 and CRP 15
S/P Left Knee arthroplasty 3/31/17  OR cultures with MSSA  Culture of synovial fluid from outside orthopedics office with MSSA  Reviewed outside labs  Continue Cefazolin 2gm IV [id number]  Follow up OR cultures  ESR 34 and CRP 15
S/P Left Knee arthroplasty 3/31/17; OR cultures & Culture of synovial fluid from outside orthopedics office with MSSA  Continue Cefazolin 2gm IV [id number]    ESR 34 and CRP 15
SERIAL BLOODS TILL CLEAR  NOT AB REASON  SEE IF CLEARS AFTER DRAINAGE  KNEE NL
Treatment as per ID.
1.  IV abx    2.  local wound care    3.  will sign off    4.  Please assure he follows with me as an outpatient as he will need a cystoscopy
1. Cont care per primary team   2. Pain management   3. Antibiotics  4. Surgery to continue to follow
Culture by MSSA.   ID c/s - c/w ancef  S/p knee arthrotomy with washout
Culture of synovial fluid from outside orthopedics office with MSSA  Reviewed outside labs  Continue Cefazolin 2gm IV [id number]  Follow up blood cultures and follow up   Orthopedics consult noted, OR for washout today  Follow up OR cultures  ESR 34 and CRP 15
MICAELA and TTE with no evidence of Vegetation  Blood cultures no growth since 4/4/17  End date for Antibiotics May 1st 2017  Needs PICC
SERIAL BLOODS  MICAELA NEG
abx aS PER id    sneed to gravity    no indication to do immediate drainage
abx as per ID    continue sneed    should have pelvic imaging in the future    FU Dr. Bailey as an outpatient
iv abx    continue sneed    may require drainage of the prostate abscess next week

## 2017-04-11 NOTE — DISCHARGE NOTE ADULT - SECONDARY DIAGNOSIS.
Bacteremia C. difficile diarrhea Splenic infarct Prostate abscess DM (diabetes mellitus) Essential hypertension

## 2017-04-11 NOTE — DISCHARGE NOTE ADULT - MEDICATION SUMMARY - MEDICATIONS TO CHANGE
I will SWITCH the dose or number of times a day I take the medications listed below when I get home from the hospital:    Lantus Solostar Pen 100 units/mL subcutaneous solution  -- 50 unit(s) subcutaneous once a day (at bedtime)    ramipril 5 mg oral capsule  -- 1 cap(s) by mouth once a day

## 2017-04-11 NOTE — PROGRESS NOTE ADULT - PROVIDER SPECIALTY LIST ADULT
Anesthesia
Anesthesia
Hospitalist
Infectious Disease
Internal Medicine
Orthopedics
Surgery
Urology
Infectious Disease
Orthopedics

## 2017-04-11 NOTE — PROGRESS NOTE ADULT - PROBLEM SELECTOR PROBLEM 1
Bacteremia
C. difficile diarrhea
Staphylococcal arthritis of left knee
C. difficile diarrhea
Persistent bacteremia
Urinary retention
Bacteremia
C. difficile diarrhea
MSSA (methicillin susceptible Staphylococcus aureus) septicemia
Persistent bacteremia
R/O Prostatic abscess
R/O Prostatic abscess
Staphylococcal arthritis of left knee
Staphylococcal arthritis of left knee
Urinary retention

## 2017-04-11 NOTE — DISCHARGE NOTE ADULT - HOSPITAL COURSE
81 y.o. M with PMH DM2, HTN, alcoholic cirrhosis, basal cell carcinoma s/p Moh's presents from PMD office with +MSSA left knee arthrocentesis S/P I&D of left knee 3/31/17. MSSA bacteremia positive Bcx 3/29/17 and 4/2/17 and repeat Bcx4/5/17 and 4/6/17 showing no growth thus far, with negative TTE/MICAELA for vegetations. ID consulted and patient continued on Ancef. Patient also developed C. Diff positive on 4/5/17 and was found to have colitis on the ct abd/pelvis and was started on vancomycin po. Pt also found to have a prostate abscess - fluid collection in the left prostate peripheral zone, measuring 2.7 x 2.0 cm. Urology was consulted and pt will require repeat imaging in 1-2 weeks and for now to c/w tx for MSSA septic joint and if repeat imaging shows progressive abscess then the pt may need drainage, unclear if this is an abscess. Patient also developed urinary retention and was placed on flomax and sneed catheter was placed. Incidental finding of developing splenic infarct observed on ct abd/pelvis on 4/5/17 for which surgery recommends no intervention at this time, unless pt develops abdominal pain, pt should have repeat imaging in a few weeks and follow with surgery. Patient is to continue with abx for septic joint with Ancef until May 1st 2017 and vancomycin po for C. Difficil for 7 more days. Patient to continue with sneed catheter and flomax and follow up with urology in 1-2 weeks for possible prostate abscess and urinary retention. Patient to f/u with pmd in 3-5 days. Pt also to f/u with Infectious disease in 1-2 weeks.     Patient seen and examined at bedside. No acute events overnight. Patient states he is feeling well, reports one episode of diarrhea today. Patient denies all other complaints. VS stable. Physical exam unchanged. Patient had picc line placed and is medically stable for discharge. D/w ID and ortho patient to f/u with Ortho in 1-2 weeks and to c/w local wound care. Pt to finish course of ancef on may 1st 2017 and to finish course of vancomycin po x 7 more days. Pt to f/u with ortho in 1-2 weeks. Pt to f/u with urology for possible prostate abscess and repeat imaging as well as sneed catheter for urinary retention in 1-2 weeks. Pt also to f/u with surgery for splenic infarct.     Discharge planning took 45 minutes 81 y.o. M with PMH DM2, HTN, alcoholic cirrhosis, basal cell carcinoma s/p Moh's presents from PMD office with +MSSA left knee arthrocentesis and admitted with septic joint,  S/P I&D of left knee 3/31/17. MSSA bacteremia positive Bcx 3/29/17 and 4/2/17 and repeat Bcx4/5/17 and 4/6/17 showing no growth thus far, with negative TTE/MICAELA for vegetations. ID consulted and patient continued on Ancef. Patient also developed C. Diff positive on 4/5/17 and was found to have colitis on the ct abd/pelvis and was started on vancomycin po. Pt also found to have a prostate abscess - fluid collection in the left prostate peripheral zone, measuring 2.7 x 2.0 cm. Urology was consulted and pt will require repeat imaging in 1-2 weeks and for now to c/w tx for MSSA septic joint and if repeat imaging shows progressive abscess then the pt may need drainage, unclear if this is an abscess. Patient also developed urinary retention and was placed on flomax and sneed catheter was placed. Incidental finding of developing splenic infarct observed on ct abd/pelvis on 4/5/17 for which surgery recommends no intervention at this time, unless pt develops abdominal pain, pt should have repeat imaging in a few weeks and follow with surgery. Patient is to continue with abx for septic joint with Ancef until May 1st 2017 and vancomycin po for C. Difficil for 7 more days. Patient to continue with sneed catheter and flomax and follow up with urology in 1-2 weeks for possible prostate abscess and urinary retention. Patient to f/u with pmd in 3-5 days. Pt also to f/u with Infectious disease in 1-2 weeks.     Patient seen and examined at bedside. No acute events overnight. Patient states he is feeling well, reports one episode of diarrhea today. Patient denies all other complaints. VS stable. Physical exam unchanged. Patient had picc line placed and is medically stable for discharge. D/w ID and ortho patient to f/u with Ortho in 1-2 weeks for septic joint and to c/w local wound care. Pt to finish course of ancef on may 1st 2017 and to finish course of vancomycin po x 7 more days. Pt to f/u with ortho in 1-2 weeks. Pt to f/u with urology for possible prostate abscess and repeat imaging as well as sneed catheter for urinary retention in 1-2 weeks. Pt also to f/u with surgery for splenic infarct.     Discharge planning took 45 minutes

## 2017-04-11 NOTE — DISCHARGE NOTE ADULT - PROVIDER TOKENS
TOKJOELLE:'[id number]',MARGA:'[id number]' TOKEN:'[id number]:'[id number]:'[id number]'

## 2017-04-11 NOTE — PROGRESS NOTE ADULT - PROBLEM SELECTOR PROBLEM 3
Alcohol use
Alcohol use
C. difficile diarrhea
R/O Prostatic abscess
Sepsis, due to unspecified organism
Splenic infarct
Urinary retention
Urinary retention
Staphylococcal arthritis of left knee
Alcohol use
C. difficile diarrhea
MSSA (methicillin susceptible Staphylococcus aureus) septicemia
Sepsis, due to unspecified organism
Splenic infarct

## 2017-04-11 NOTE — PROGRESS NOTE ADULT - PROBLEM SELECTOR PROBLEM 2
Bacteremia
MSSA (methicillin susceptible Staphylococcus aureus) septicemia
R/O Prostatic abscess
R/O Prostatic abscess
Staphylococcal arthritis of left knee
MSSA (methicillin susceptible Staphylococcus aureus) septicemia
Prostatic abscess
R/O Prostatic abscess
Bacteremia
Bacteremia
MSSA (methicillin susceptible Staphylococcus aureus) septicemia
Staphylococcal arthritis of left knee
Staphylococcal arthritis of left knee

## 2017-04-11 NOTE — DISCHARGE NOTE ADULT - CARE PROVIDER_API CALL
Chris Ramesh), Infectious Disease; Internal Medicine  [address] [place] [address]  Phone: [phone]  Fax: [phone]    Gee Brsaher), Urology  [address] [place] [address]  Phone: [phone]  Fax: [phone] Chris Ramesh), Infectious Disease; Internal Medicine  [address] [facility] [place] [address]  Phone: [phone]  Fax: [phone]    Gee Brasher), Urology  [address] [place] [address]  Phone: [phone]  Fax: [phone]    Fernandez Boykin), Surgery; Surgical Critical Care  [address] [place] [address]  Phone: [phone]  Fax: [phone]

## 2017-04-11 NOTE — PROCEDURE NOTE - NSPROCDETAILS_GEN_ALL_CORE
ultrasound assessment/location identified, draped/prepped, sterile technique used/sterile dressing applied/supine position/sterile technique, catheter placed/ultrasound guidance

## 2017-04-11 NOTE — DISCHARGE NOTE ADULT - PATIENT PORTAL LINK FT
“You can access the Follow[facility] Patient Portal, offered by [facility], by registering with the following website: [email or web address]”

## 2017-04-11 NOTE — DISCHARGE NOTE ADULT - ADDITIONAL INSTRUCTIONS
Please follow up with primary care physician in 3-5 days. Please follow up with urologist Dr. Brasher for further management and care for left groin abscess/ possible prostate abscess and maintenance of sneed catheter. Please follow up with Dr. Lind orthopedic surgeon in 1-2 weeks. Please follow up with infectious disease physician in 1-2 weeks Dr. Ramesh. Patient to complete course of Ancef on May 1st 2017 and to complete vancomycin for 7 more days. Please follow up with primary care physician in 3-5 days. Please follow up with urologist Dr. Brasher for further management and care for left groin abscess/ possible prostate abscess and maintenance of sneed catheter. Please follow up with Dr. Lind orthopedic surgeon in 1-2 weeks. Please follow up with infectious disease physician in 1 week Dr. Ramesh, please make an appointment at [phone]. Patient will need weekly CBC CMP ESR and CRP faxed to [phone]. Patient to complete course of Ancef on May 1st 2017 and to complete vancomycin for 7 more days.

## 2017-04-11 NOTE — PROGRESS NOTE ADULT - PROBLEM SELECTOR PLAN 3
continue Vanco PO 125mg Q6H  Will likely need to prolong treatment since patient still having diarrhea and is also on IV antibiotics  anticipate 14 days of PO Vancomycin

## 2017-04-11 NOTE — PROGRESS NOTE ADULT - PROBLEM SELECTOR PLAN 2
S/P Left Knee arthroplasty 3/31/17; OR cultures & Culture of synovial fluid from outside orthopedics office arthrocentesis culture with  MSSA  Continue Cefazolin 2gm IV [id number]  ESR 34   CRP 15  End date for Antibiotics May 1st 2017  Needs PICC  Will need weekly CBC CMP ESR and CRP faxed to [phone]  Appointment with us in 7 to 10 days - [phone]

## 2017-04-11 NOTE — DISCHARGE NOTE ADULT - PLAN OF CARE
Continue with current medical management. Please follow up with Dr. Brasher urologist in 1-2 weeks for further management and repeat imaging, unclear if prostate abscess. Patient also has sneed catheter for urinary retention and is to continue with sneed catheter and follow up with urology for further management. Patient was evaluated by surgery and should follow up with repeat imaging in 2-3 weeks. Please make an appointment at the phone number below with the surgeon for further management and care. Dr. Boykin. Continue with vancomycin orally as directed for 7 more days. Resolved Prevent further infections. Please continue with current antibiotic Ancef until May 1st 2017. Patient to follow up with infectious disease specialist in 2-3 weeks and to follow up with orthopedic surgeon in 1-2 weeks. Please see phone number below to make an appointment.

## 2017-04-12 LAB
CULTURE RESULTS: SIGNIFICANT CHANGE UP
CULTURE RESULTS: SIGNIFICANT CHANGE UP
SPECIMEN SOURCE: SIGNIFICANT CHANGE UP
SPECIMEN SOURCE: SIGNIFICANT CHANGE UP

## 2017-04-17 PROBLEM — E11.9 TYPE 2 DIABETES MELLITUS WITHOUT COMPLICATIONS: Chronic | Status: ACTIVE | Noted: 2017-03-29

## 2017-04-18 PROBLEM — Z00.00 ENCOUNTER FOR PREVENTIVE HEALTH EXAMINATION: Status: ACTIVE | Noted: 2017-04-18

## 2017-04-19 ENCOUNTER — MESSAGE (OUTPATIENT)
Age: 82
End: 2017-04-19

## 2017-04-20 ENCOUNTER — APPOINTMENT (OUTPATIENT)
Dept: TRAUMA SURGERY | Facility: CLINIC | Age: 82
End: 2017-04-20

## 2017-04-20 VITALS
SYSTOLIC BLOOD PRESSURE: 126 MMHG | HEART RATE: 96 BPM | OXYGEN SATURATION: 98 % | RESPIRATION RATE: 15 BRPM | HEIGHT: 69 IN | DIASTOLIC BLOOD PRESSURE: 67 MMHG | TEMPERATURE: 98.5 F

## 2017-04-20 DIAGNOSIS — Z87.39 PERSONAL HISTORY OF OTHER DISEASES OF THE MUSCULOSKELETAL SYSTEM AND CONNECTIVE TISSUE: ICD-10-CM

## 2017-04-20 DIAGNOSIS — Z78.9 OTHER SPECIFIED HEALTH STATUS: ICD-10-CM

## 2017-04-20 DIAGNOSIS — C44.311 BASAL CELL CARCINOMA OF SKIN OF NOSE: ICD-10-CM

## 2017-04-20 DIAGNOSIS — Z86.39 PERSONAL HISTORY OF OTHER ENDOCRINE, NUTRITIONAL AND METABOLIC DISEASE: ICD-10-CM

## 2017-04-20 DIAGNOSIS — Z86.79 PERSONAL HISTORY OF OTHER DISEASES OF THE CIRCULATORY SYSTEM: ICD-10-CM

## 2017-04-20 DIAGNOSIS — D73.5 INFARCTION OF SPLEEN: ICD-10-CM

## 2017-04-20 DIAGNOSIS — Z80.0 FAMILY HISTORY OF MALIGNANT NEOPLASM OF DIGESTIVE ORGANS: ICD-10-CM

## 2017-04-20 DIAGNOSIS — C44.111 BASAL CELL CARCINOMA OF SKIN OF UNSPECIFIED EYELID, INCLUDING CANTHUS: ICD-10-CM

## 2017-04-24 ENCOUNTER — APPOINTMENT (OUTPATIENT)
Dept: ORTHOPEDIC SURGERY | Facility: CLINIC | Age: 82
End: 2017-04-24

## 2017-04-24 VITALS
SYSTOLIC BLOOD PRESSURE: 146 MMHG | BODY MASS INDEX: 26.66 KG/M2 | HEIGHT: 69 IN | HEART RATE: 88 BPM | DIASTOLIC BLOOD PRESSURE: 68 MMHG | WEIGHT: 180 LBS

## 2017-04-24 LAB
CULTURE RESULTS: SIGNIFICANT CHANGE UP
SPECIMEN SOURCE: SIGNIFICANT CHANGE UP

## 2017-04-25 PROBLEM — Z80.0 FAMILY HISTORY OF PANCREATIC CANCER: Status: ACTIVE | Noted: 2017-04-20

## 2017-04-25 PROBLEM — Z78.9 NON-SMOKER: Status: ACTIVE | Noted: 2017-04-20

## 2017-04-25 PROBLEM — D73.5 SPLENIC INFARCT: Status: ACTIVE | Noted: 2017-04-20

## 2017-04-26 ENCOUNTER — APPOINTMENT (OUTPATIENT)
Dept: ORTHOPEDIC SURGERY | Facility: CLINIC | Age: 82
End: 2017-04-26

## 2017-04-27 ENCOUNTER — OUTPATIENT (OUTPATIENT)
Dept: OUTPATIENT SERVICES | Facility: HOSPITAL | Age: 82
LOS: 1 days | End: 2017-04-27
Payer: MEDICARE

## 2017-04-27 ENCOUNTER — APPOINTMENT (OUTPATIENT)
Dept: CT IMAGING | Facility: CLINIC | Age: 82
End: 2017-04-27

## 2017-04-27 DIAGNOSIS — D73.5 INFARCTION OF SPLEEN: ICD-10-CM

## 2017-04-27 PROCEDURE — 74177 CT ABD & PELVIS W/CONTRAST: CPT

## 2017-04-27 PROCEDURE — 82565 ASSAY OF CREATININE: CPT

## 2017-05-13 LAB
CULTURE RESULTS: SIGNIFICANT CHANGE UP
SPECIMEN SOURCE: SIGNIFICANT CHANGE UP

## 2017-05-18 ENCOUNTER — APPOINTMENT (OUTPATIENT)
Dept: TRAUMA SURGERY | Facility: CLINIC | Age: 82
End: 2017-05-18

## 2017-05-18 VITALS
OXYGEN SATURATION: 98 % | DIASTOLIC BLOOD PRESSURE: 66 MMHG | RESPIRATION RATE: 16 BRPM | HEART RATE: 83 BPM | SYSTOLIC BLOOD PRESSURE: 126 MMHG | HEIGHT: 69 IN | TEMPERATURE: 97.6 F

## 2017-08-16 ENCOUNTER — INPATIENT (INPATIENT)
Facility: HOSPITAL | Age: 82
LOS: 3 days | Discharge: HOME | DRG: 381 | End: 2017-08-20
Admitting: HOSPITALIST
Payer: MEDICARE

## 2017-08-16 VITALS
HEIGHT: 69 IN | TEMPERATURE: 99 F | HEART RATE: 95 BPM | WEIGHT: 179.9 LBS | OXYGEN SATURATION: 98 % | DIASTOLIC BLOOD PRESSURE: 65 MMHG | SYSTOLIC BLOOD PRESSURE: 108 MMHG | RESPIRATION RATE: 16 BRPM

## 2017-08-16 LAB
ALBUMIN SERPL ELPH-MCNC: 3.2 G/DL — LOW (ref 3.3–5.2)
ALP SERPL-CCNC: 55 U/L — SIGNIFICANT CHANGE UP (ref 40–120)
ALT FLD-CCNC: 19 U/L — SIGNIFICANT CHANGE UP
ANION GAP SERPL CALC-SCNC: 12 MMOL/L — SIGNIFICANT CHANGE UP (ref 5–17)
ANISOCYTOSIS BLD QL: SLIGHT — SIGNIFICANT CHANGE UP
AST SERPL-CCNC: 39 U/L — SIGNIFICANT CHANGE UP
BASOPHILS # BLD AUTO: 0 K/UL — SIGNIFICANT CHANGE UP (ref 0–0.2)
BASOPHILS NFR BLD AUTO: 0.2 % — SIGNIFICANT CHANGE UP (ref 0–2)
BILIRUB SERPL-MCNC: 0.6 MG/DL — SIGNIFICANT CHANGE UP (ref 0.4–2)
BUN SERPL-MCNC: 36 MG/DL — HIGH (ref 8–20)
CALCIUM SERPL-MCNC: 8.5 MG/DL — LOW (ref 8.6–10.2)
CHLORIDE SERPL-SCNC: 104 MMOL/L — SIGNIFICANT CHANGE UP (ref 98–107)
CK SERPL-CCNC: 106 U/L — SIGNIFICANT CHANGE UP (ref 30–200)
CO2 SERPL-SCNC: 23 MMOL/L — SIGNIFICANT CHANGE UP (ref 22–29)
CREAT SERPL-MCNC: 1.04 MG/DL — SIGNIFICANT CHANGE UP (ref 0.5–1.3)
EOSINOPHIL # BLD AUTO: 0 K/UL — SIGNIFICANT CHANGE UP (ref 0–0.5)
EOSINOPHIL NFR BLD AUTO: 0.5 % — SIGNIFICANT CHANGE UP (ref 0–5)
GLUCOSE SERPL-MCNC: 149 MG/DL — HIGH (ref 70–115)
HCT VFR BLD CALC: 16.7 % — CRITICAL LOW (ref 42–52)
HGB BLD-MCNC: 5.8 G/DL — CRITICAL LOW (ref 14–18)
HYPOCHROMIA BLD QL: SLIGHT — SIGNIFICANT CHANGE UP
INR BLD: 1.36 RATIO — HIGH (ref 0.88–1.16)
LIDOCAIN IGE QN: 57 U/L — HIGH (ref 22–51)
LYMPHOCYTES # BLD AUTO: 1.4 K/UL — SIGNIFICANT CHANGE UP (ref 1–4.8)
LYMPHOCYTES # BLD AUTO: 23.3 % — SIGNIFICANT CHANGE UP (ref 20–55)
MACROCYTES BLD QL: SLIGHT — SIGNIFICANT CHANGE UP
MCHC RBC-ENTMCNC: 33.9 PG — HIGH (ref 27–31)
MCHC RBC-ENTMCNC: 34.7 G/DL — SIGNIFICANT CHANGE UP (ref 32–36)
MCV RBC AUTO: 97.7 FL — HIGH (ref 80–94)
MICROCYTES BLD QL: SLIGHT — SIGNIFICANT CHANGE UP
MONOCYTES # BLD AUTO: 0.6 K/UL — SIGNIFICANT CHANGE UP (ref 0–0.8)
MONOCYTES NFR BLD AUTO: 11.2 % — HIGH (ref 3–10)
NEUTROPHILS # BLD AUTO: 3.8 K/UL — SIGNIFICANT CHANGE UP (ref 1.8–8)
NEUTROPHILS NFR BLD AUTO: 64.6 % — SIGNIFICANT CHANGE UP (ref 37–73)
OB PNL STL: POSITIVE
PLAT MORPH BLD: NORMAL — SIGNIFICANT CHANGE UP
PLATELET # BLD AUTO: 93 K/UL — LOW (ref 150–400)
POTASSIUM SERPL-MCNC: 3.6 MMOL/L — SIGNIFICANT CHANGE UP (ref 3.5–5.3)
POTASSIUM SERPL-SCNC: 3.6 MMOL/L — SIGNIFICANT CHANGE UP (ref 3.5–5.3)
PROT SERPL-MCNC: 5.9 G/DL — LOW (ref 6.6–8.7)
PROTHROM AB SERPL-ACNC: 15 SEC — HIGH (ref 9.8–12.7)
RBC # BLD: 1.71 M/UL — LOW (ref 4.6–6.2)
RBC # FLD: 15.6 % — SIGNIFICANT CHANGE UP (ref 11–15.6)
RBC BLD AUTO: ABNORMAL
SODIUM SERPL-SCNC: 139 MMOL/L — SIGNIFICANT CHANGE UP (ref 135–145)
TARGETS BLD QL SMEAR: SLIGHT — SIGNIFICANT CHANGE UP
TROPONIN T SERPL-MCNC: 0.02 NG/ML — SIGNIFICANT CHANGE UP (ref 0–0.06)
WBC # BLD: 5.8 K/UL — SIGNIFICANT CHANGE UP (ref 4.8–10.8)
WBC # FLD AUTO: 5.8 K/UL — SIGNIFICANT CHANGE UP (ref 4.8–10.8)

## 2017-08-16 PROCEDURE — 93010 ELECTROCARDIOGRAM REPORT: CPT

## 2017-08-16 PROCEDURE — 99291 CRITICAL CARE FIRST HOUR: CPT

## 2017-08-16 PROCEDURE — 71020: CPT | Mod: 26

## 2017-08-16 RX ORDER — PANTOPRAZOLE SODIUM 20 MG/1
8 TABLET, DELAYED RELEASE ORAL
Qty: 80 | Refills: 0 | Status: DISCONTINUED | OUTPATIENT
Start: 2017-08-16 | End: 2017-08-18

## 2017-08-16 RX ORDER — OCTREOTIDE ACETATE 200 UG/ML
50 INJECTION, SOLUTION INTRAVENOUS; SUBCUTANEOUS
Qty: 500 | Refills: 0 | Status: DISCONTINUED | OUTPATIENT
Start: 2017-08-16 | End: 2017-08-17

## 2017-08-16 RX ORDER — PANTOPRAZOLE SODIUM 20 MG/1
40 TABLET, DELAYED RELEASE ORAL ONCE
Qty: 0 | Refills: 0 | Status: COMPLETED | OUTPATIENT
Start: 2017-08-16 | End: 2017-08-16

## 2017-08-16 RX ORDER — OCTREOTIDE ACETATE 200 UG/ML
50 INJECTION, SOLUTION INTRAVENOUS; SUBCUTANEOUS ONCE
Qty: 0 | Refills: 0 | Status: COMPLETED | OUTPATIENT
Start: 2017-08-16 | End: 2017-08-16

## 2017-08-16 RX ORDER — SODIUM CHLORIDE 9 MG/ML
3 INJECTION INTRAMUSCULAR; INTRAVENOUS; SUBCUTANEOUS ONCE
Qty: 0 | Refills: 0 | Status: COMPLETED | OUTPATIENT
Start: 2017-08-16 | End: 2017-08-16

## 2017-08-16 RX ADMIN — SODIUM CHLORIDE 3 MILLILITER(S): 9 INJECTION INTRAMUSCULAR; INTRAVENOUS; SUBCUTANEOUS at 23:35

## 2017-08-16 RX ADMIN — PANTOPRAZOLE SODIUM 40 MILLIGRAM(S): 20 TABLET, DELAYED RELEASE ORAL at 23:35

## 2017-08-16 RX ADMIN — PANTOPRAZOLE SODIUM 10 MG/HR: 20 TABLET, DELAYED RELEASE ORAL at 23:35

## 2017-08-16 NOTE — ED PROVIDER NOTE - OBJECTIVE STATEMENT
82 y/o M PMHx Cirrhosis presents to ED c/o weakness and chest discomfort. Pt states he had vomited blood and bloody stools 3 days ago. Has had this previously and was at [facility]. States stool is now normal. Denies N/V/D, fever, chills, SOB, difficulty breathing, HA, numbness, tingling and abd pain.   PCP: Dr. Gupta 80 y/o M PMHx Cirrhosis presents to ED c/o weakness and chest discomfort. Pt states he had vomited blood and bloody stools 3 days ago. Has had this previously and was at [facility]. States stool is now normal. Denies N/V/D, fever, chills, SOB, difficulty breathing, HA, numbness, tingling and abd pain.   PCP: Dr. Lawrence Terrazas 80 y/o M PMHx Cirrhosis presents to ED c/o weakness and chest discomfort. Pt states he had vomited blood and bloody stools 3 days ago. Has had this previously and was at [facility]. He was doing well afterward. However, his girlfriend looked at him today and felt he looked really ill prompting presentation. States stool is now normal. Denies N/V/D, fever, chills, SOB, difficulty breathing, HA, numbness, tingling and abd pain.   PCP: Dr. Lawrence Terrazas

## 2017-08-16 NOTE — ED ADULT TRIAGE NOTE - CHIEF COMPLAINT QUOTE
as per ems vomitting blood for the last two days and he has had bloody stiool - cirrohsis of liver complains of weakness and dizziness

## 2017-08-16 NOTE — ED PROVIDER NOTE - CRITICAL CARE PROVIDED
consultation with other physicians/additional history taking/documentation/direct patient care (not related to procedure)

## 2017-08-16 NOTE — ED PROVIDER NOTE - CARE PLAN
Principal Discharge DX:	GI bleed  Secondary Diagnosis:	Anemia  Secondary Diagnosis:	Cirrhosis of liver without ascites

## 2017-08-17 DIAGNOSIS — Z98.890 OTHER SPECIFIED POSTPROCEDURAL STATES: Chronic | ICD-10-CM

## 2017-08-17 DIAGNOSIS — D47.3 ESSENTIAL (HEMORRHAGIC) THROMBOCYTHEMIA: ICD-10-CM

## 2017-08-17 DIAGNOSIS — D64.9 ANEMIA, UNSPECIFIED: ICD-10-CM

## 2017-08-17 DIAGNOSIS — K74.60 UNSPECIFIED CIRRHOSIS OF LIVER: ICD-10-CM

## 2017-08-17 DIAGNOSIS — R79.1 ABNORMAL COAGULATION PROFILE: ICD-10-CM

## 2017-08-17 DIAGNOSIS — E11.9 TYPE 2 DIABETES MELLITUS WITHOUT COMPLICATIONS: ICD-10-CM

## 2017-08-17 DIAGNOSIS — I10 ESSENTIAL (PRIMARY) HYPERTENSION: ICD-10-CM

## 2017-08-17 DIAGNOSIS — K92.0 HEMATEMESIS: ICD-10-CM

## 2017-08-17 DIAGNOSIS — K92.2 GASTROINTESTINAL HEMORRHAGE, UNSPECIFIED: ICD-10-CM

## 2017-08-17 DIAGNOSIS — F10.10 ALCOHOL ABUSE, UNCOMPLICATED: ICD-10-CM

## 2017-08-17 DIAGNOSIS — Z29.9 ENCOUNTER FOR PROPHYLACTIC MEASURES, UNSPECIFIED: ICD-10-CM

## 2017-08-17 LAB
ALBUMIN SERPL ELPH-MCNC: 3.3 G/DL — SIGNIFICANT CHANGE UP (ref 3.3–5.2)
ALP SERPL-CCNC: 53 U/L — SIGNIFICANT CHANGE UP (ref 40–120)
ALT FLD-CCNC: 23 U/L — SIGNIFICANT CHANGE UP
AMMONIA BLD-MCNC: 33 UMOL/L — SIGNIFICANT CHANGE UP (ref 11–55)
ANION GAP SERPL CALC-SCNC: 13 MMOL/L — SIGNIFICANT CHANGE UP (ref 5–17)
AST SERPL-CCNC: 46 U/L — HIGH
BASOPHILS # BLD AUTO: 0 K/UL — SIGNIFICANT CHANGE UP (ref 0–0.2)
BASOPHILS NFR BLD AUTO: 0.2 % — SIGNIFICANT CHANGE UP (ref 0–2)
BILIRUB DIRECT SERPL-MCNC: 0.7 MG/DL — HIGH (ref 0–0.3)
BILIRUB INDIRECT FLD-MCNC: 1.5 MG/DL — HIGH (ref 0.2–1)
BILIRUB SERPL-MCNC: 2.2 MG/DL — HIGH (ref 0.4–2)
BLD GP AB SCN SERPL QL: SIGNIFICANT CHANGE UP
BUN SERPL-MCNC: 31 MG/DL — HIGH (ref 8–20)
CALCIUM SERPL-MCNC: 8.1 MG/DL — LOW (ref 8.6–10.2)
CHLORIDE SERPL-SCNC: 104 MMOL/L — SIGNIFICANT CHANGE UP (ref 98–107)
CO2 SERPL-SCNC: 24 MMOL/L — SIGNIFICANT CHANGE UP (ref 22–29)
CREAT SERPL-MCNC: 1.04 MG/DL — SIGNIFICANT CHANGE UP (ref 0.5–1.3)
EOSINOPHIL # BLD AUTO: 0.1 K/UL — SIGNIFICANT CHANGE UP (ref 0–0.5)
EOSINOPHIL NFR BLD AUTO: 2.2 % — SIGNIFICANT CHANGE UP (ref 0–5)
GLUCOSE SERPL-MCNC: 159 MG/DL — HIGH (ref 70–115)
HCT VFR BLD CALC: 22.7 % — LOW (ref 42–52)
HGB BLD-MCNC: 7.9 G/DL — LOW (ref 14–18)
INR BLD: 1.31 RATIO — HIGH (ref 0.88–1.16)
LYMPHOCYTES # BLD AUTO: 1.2 K/UL — SIGNIFICANT CHANGE UP (ref 1–4.8)
LYMPHOCYTES # BLD AUTO: 25.4 % — SIGNIFICANT CHANGE UP (ref 20–55)
MAGNESIUM SERPL-MCNC: 1.9 MG/DL — SIGNIFICANT CHANGE UP (ref 1.6–2.6)
MCHC RBC-ENTMCNC: 32.4 PG — HIGH (ref 27–31)
MCHC RBC-ENTMCNC: 34.8 G/DL — SIGNIFICANT CHANGE UP (ref 32–36)
MCV RBC AUTO: 93 FL — SIGNIFICANT CHANGE UP (ref 80–94)
MONOCYTES # BLD AUTO: 0.6 K/UL — SIGNIFICANT CHANGE UP (ref 0–0.8)
MONOCYTES NFR BLD AUTO: 12.3 % — HIGH (ref 3–10)
NEUTROPHILS # BLD AUTO: 2.9 K/UL — SIGNIFICANT CHANGE UP (ref 1.8–8)
NEUTROPHILS NFR BLD AUTO: 59.7 % — SIGNIFICANT CHANGE UP (ref 37–73)
PHOSPHATE SERPL-MCNC: 3.8 MG/DL — SIGNIFICANT CHANGE UP (ref 2.4–4.7)
PLATELET # BLD AUTO: 73 K/UL — LOW (ref 150–400)
POTASSIUM SERPL-MCNC: 3.8 MMOL/L — SIGNIFICANT CHANGE UP (ref 3.5–5.3)
POTASSIUM SERPL-SCNC: 3.8 MMOL/L — SIGNIFICANT CHANGE UP (ref 3.5–5.3)
PROT SERPL-MCNC: 5.9 G/DL — LOW (ref 6.6–8.7)
PROTHROM AB SERPL-ACNC: 14.5 SEC — HIGH (ref 9.8–12.7)
RBC # BLD: 2.44 M/UL — LOW (ref 4.6–6.2)
RBC # FLD: 17.9 % — HIGH (ref 11–15.6)
SODIUM SERPL-SCNC: 141 MMOL/L — SIGNIFICANT CHANGE UP (ref 135–145)
TYPE + AB SCN PNL BLD: SIGNIFICANT CHANGE UP
WBC # BLD: 4.9 K/UL — SIGNIFICANT CHANGE UP (ref 4.8–10.8)
WBC # FLD AUTO: 4.9 K/UL — SIGNIFICANT CHANGE UP (ref 4.8–10.8)

## 2017-08-17 PROCEDURE — 99223 1ST HOSP IP/OBS HIGH 75: CPT | Mod: 25

## 2017-08-17 PROCEDURE — 43235 EGD DIAGNOSTIC BRUSH WASH: CPT

## 2017-08-17 PROCEDURE — 93010 ELECTROCARDIOGRAM REPORT: CPT

## 2017-08-17 PROCEDURE — 12345: CPT | Mod: NC

## 2017-08-17 RX ORDER — FERROUS SULFATE 325(65) MG
325 TABLET ORAL EVERY 8 HOURS
Qty: 0 | Refills: 0 | Status: DISCONTINUED | OUTPATIENT
Start: 2017-08-17 | End: 2017-08-20

## 2017-08-17 RX ORDER — PROPRANOLOL HCL 160 MG
20 CAPSULE, EXTENDED RELEASE 24HR ORAL
Qty: 0 | Refills: 0 | Status: DISCONTINUED | OUTPATIENT
Start: 2017-08-17 | End: 2017-08-17

## 2017-08-17 RX ORDER — SODIUM CHLORIDE 9 MG/ML
1000 INJECTION, SOLUTION INTRAVENOUS
Qty: 0 | Refills: 0 | Status: DISCONTINUED | OUTPATIENT
Start: 2017-08-17 | End: 2017-08-19

## 2017-08-17 RX ORDER — SODIUM CHLORIDE 9 MG/ML
1000 INJECTION, SOLUTION INTRAVENOUS
Qty: 0 | Refills: 0 | Status: DISCONTINUED | OUTPATIENT
Start: 2017-08-17 | End: 2017-08-20

## 2017-08-17 RX ORDER — INSULIN LISPRO 100/ML
VIAL (ML) SUBCUTANEOUS
Qty: 0 | Refills: 0 | Status: DISCONTINUED | OUTPATIENT
Start: 2017-08-17 | End: 2017-08-20

## 2017-08-17 RX ORDER — GLUCAGON INJECTION, SOLUTION 0.5 MG/.1ML
1 INJECTION, SOLUTION SUBCUTANEOUS ONCE
Qty: 0 | Refills: 0 | Status: DISCONTINUED | OUTPATIENT
Start: 2017-08-17 | End: 2017-08-20

## 2017-08-17 RX ORDER — CARVEDILOL PHOSPHATE 80 MG/1
3.12 CAPSULE, EXTENDED RELEASE ORAL EVERY 12 HOURS
Qty: 0 | Refills: 0 | Status: DISCONTINUED | OUTPATIENT
Start: 2017-08-17 | End: 2017-08-17

## 2017-08-17 RX ORDER — DEXTROSE 50 % IN WATER 50 %
25 SYRINGE (ML) INTRAVENOUS ONCE
Qty: 0 | Refills: 0 | Status: DISCONTINUED | OUTPATIENT
Start: 2017-08-17 | End: 2017-08-20

## 2017-08-17 RX ORDER — DEXTROSE 50 % IN WATER 50 %
12.5 SYRINGE (ML) INTRAVENOUS ONCE
Qty: 0 | Refills: 0 | Status: DISCONTINUED | OUTPATIENT
Start: 2017-08-17 | End: 2017-08-20

## 2017-08-17 RX ORDER — ONDANSETRON 8 MG/1
4 TABLET, FILM COATED ORAL ONCE
Qty: 0 | Refills: 0 | Status: COMPLETED | OUTPATIENT
Start: 2017-08-17 | End: 2017-08-17

## 2017-08-17 RX ORDER — DEXTROSE 50 % IN WATER 50 %
1 SYRINGE (ML) INTRAVENOUS ONCE
Qty: 0 | Refills: 0 | Status: DISCONTINUED | OUTPATIENT
Start: 2017-08-17 | End: 2017-08-20

## 2017-08-17 RX ORDER — ONDANSETRON 8 MG/1
4 TABLET, FILM COATED ORAL EVERY 6 HOURS
Qty: 0 | Refills: 0 | Status: DISCONTINUED | OUTPATIENT
Start: 2017-08-17 | End: 2017-08-20

## 2017-08-17 RX ADMIN — OCTREOTIDE ACETATE 10 MICROGRAM(S)/HR: 200 INJECTION, SOLUTION INTRAVENOUS; SUBCUTANEOUS at 11:16

## 2017-08-17 RX ADMIN — Medication 1 MILLIGRAM(S): at 09:36

## 2017-08-17 RX ADMIN — SODIUM CHLORIDE 100 MILLILITER(S): 9 INJECTION, SOLUTION INTRAVENOUS at 11:16

## 2017-08-17 RX ADMIN — Medication 325 MILLIGRAM(S): at 06:50

## 2017-08-17 RX ADMIN — ONDANSETRON 4 MILLIGRAM(S): 8 TABLET, FILM COATED ORAL at 02:02

## 2017-08-17 RX ADMIN — Medication 50 MILLIGRAM(S): at 02:02

## 2017-08-17 RX ADMIN — Medication 325 MILLIGRAM(S): at 22:50

## 2017-08-17 RX ADMIN — Medication 1: at 18:51

## 2017-08-17 RX ADMIN — Medication 325 MILLIGRAM(S): at 18:52

## 2017-08-17 RX ADMIN — PANTOPRAZOLE SODIUM 10 MG/HR: 20 TABLET, DELAYED RELEASE ORAL at 11:16

## 2017-08-17 RX ADMIN — OCTREOTIDE ACETATE 10 MICROGRAM(S)/HR: 200 INJECTION, SOLUTION INTRAVENOUS; SUBCUTANEOUS at 02:03

## 2017-08-17 RX ADMIN — OCTREOTIDE ACETATE 50 MICROGRAM(S): 200 INJECTION, SOLUTION INTRAVENOUS; SUBCUTANEOUS at 02:02

## 2017-08-17 NOTE — BRIEF OPERATIVE NOTE - OPERATION/FINDINGS
Severe LA Grade D erosive esophagitis from 34 to 40 cm. with large necrotic ulcerations w/o active bleeding. No esophageal varices. Biopsies not taken because of severity of esophagitis and elevated INR. Severe erosive gastritis involving the proximal stomach. No biopsies taken. D1 and D2 normal.

## 2017-08-17 NOTE — PROGRESS NOTE ADULT - SUBJECTIVE AND OBJECTIVE BOX
CC: weakness and dark stool    Patient seen and examined at bedside, No acute overnight events. He has been afebrile , denies any chest pain , no nausea or vomiting. Follow  H&H this am, pt pending for GI evaluation .    Denies fever, chest pain, SOB, abdominal pain, diarrhea, constipation, calf pain.  Patient ambulating, eating well, voiding normally     Cardiac monitor reviewed;    VS:   Vital Signs Last 24 Hrs  T(C): 37 (17 Aug 2017 03:56), Max: 37 (16 Aug 2017 21:15)  T(F): 98.6 (17 Aug 2017 03:56), Max: 98.6 (16 Aug 2017 21:15)  HR: 85 (17 Aug 2017 03:56) (85 - 95)  BP: 95/42 (17 Aug 2017 03:56) (95/42 - 111/54)  BP(mean): --  RR: 19 (17 Aug 2017 03:56) (16 - 19)  SpO2: 98% (17 Aug 2017 03:56) (97% - 99%          Labs:                        5.8    5.8   )-----------( 93       ( 16 Aug 2017 23:05 )             16.7   08-16    139  |  104  |  36.0<H>  ----------------------------<  149<H>  3.6   |  23.0  |  1.04    Ca    8.5<L>      16 Aug 2017 23:05    TPro  5.9<L>  /  Alb  3.2<L>  /  TBili  0.6  /  DBili  x   /  AST  39  /  ALT  19  /  AlkPhos  55  08-16        Radiology:  *Pull    Medications:  MEDICATIONS  (STANDING):  pantoprazole Infusion 8 mG/Hr (10 mL/Hr) IV Continuous <Continuous>  octreotide  Infusion 50 MICROgram(s)/Hr (10 mL/Hr) IV Continuous <Continuous>  insulin lispro (HumaLOG) corrective regimen sliding scale   SubCutaneous three times a day before meals  dextrose 5%. 1000 milliLiter(s) (50 mL/Hr) IV Continuous <Continuous>  dextrose 50% Injectable 12.5 Gram(s) IV Push once  dextrose 50% Injectable 25 Gram(s) IV Push once  dextrose 50% Injectable 25 Gram(s) IV Push once  ferrous    sulfate 325 milliGRAM(s) Oral every 8 hours  dextrose 5% + sodium chloride 0.45%. 1000 milliLiter(s) (100 mL/Hr) IV Continuous <Continuous>  carvedilol 3.125 milliGRAM(s) Oral every 12 hours    MEDICATIONS  (PRN):  dextrose Gel 1 Dose(s) Oral once PRN Blood Glucose LESS THAN 70 milliGRAM(s)/deciliter  glucagon  Injectable 1 milliGRAM(s) IntraMuscular once PRN Glucose LESS THAN 70 milligrams/deciliter  LORazepam   Injectable 1 milliGRAM(s) IV Push every 2 hours PRN CIWA-Ar score increase by 2 points and a total score of 7 or less CC: weakness and dark stool    Patient seen and examined at bedside, No acute overnight events. He has been afebrile , denies any chest pain , no nausea or vomiting. Follow  H&H this am, pt pending for GI evaluation .    Denies fever, chest pain, SOB, abdominal pain, diarrhea, constipation, calf pain.  Patient ambulating, eating well, voiding normally     Cardiac monitor reviewed;    VS:   Vital Signs Last 24 Hrs  T(C): 37 (17 Aug 2017 03:56), Max: 37 (16 Aug 2017 21:15)  T(F): 98.6 (17 Aug 2017 03:56), Max: 98.6 (16 Aug 2017 21:15)  HR: 85 (17 Aug 2017 03:56) (85 - 95)  BP: 95/42 (17 Aug 2017 03:56) (95/42 - 111/54)  BP(mean): --  RR: 19 (17 Aug 2017 03:56) (16 - 19)  SpO2: 98% (17 Aug 2017 03:56) (97% - 99%      GENERAL: NAD, well-groomed, well-developed  HEAD:  Atraumatic, Normocephalic  EYES: EOMI, PERRLA, conjunctiva and sclera clear  ENMT: No tonsillar erythema, exudates, or enlargement; Moist mucous membranes, Good dentition, No lesions  NECK: Supple, No JVD, Normal thyroid  LUNG: Clear to auscultation bilaterally; No rales, rhonchi, wheezing, or rubs  HEART: Regular rate and rhythm; No murmurs, rubs, or gallops  ABDOMEN: Soft, Nontender, Nondistended; Bowel sounds present  EXTREMITIES:  2+ Peripheral Pulses, No clubbing, cyanosis, or edema  NERVOUS SYSTEM:  Alert & Oriented X3, Good concentration; Motor Strength 5/5 B/L upper and lower extremities;   SKIN: No rashes or lesions , capillary refill < 2 seconds      Labs:                        5.8    5.8   )-----------( 93       ( 16 Aug 2017 23:05 )             16.7   08-16    139  |  104  |  36.0<H>  ----------------------------<  149<H>  3.6   |  23.0  |  1.04    Ca    8.5<L>      16 Aug 2017 23:05    TPro  5.9<L>  /  Alb  3.2<L>  /  TBili  0.6  /  DBili  x   /  AST  39  /  ALT  19  /  AlkPhos  55  08-16        Radiology:  *Pull    Medications:  MEDICATIONS  (STANDING):  pantoprazole Infusion 8 mG/Hr (10 mL/Hr) IV Continuous <Continuous>  octreotide  Infusion 50 MICROgram(s)/Hr (10 mL/Hr) IV Continuous <Continuous>  insulin lispro (HumaLOG) corrective regimen sliding scale   SubCutaneous three times a day before meals  dextrose 5%. 1000 milliLiter(s) (50 mL/Hr) IV Continuous <Continuous>  dextrose 50% Injectable 12.5 Gram(s) IV Push once  dextrose 50% Injectable 25 Gram(s) IV Push once  dextrose 50% Injectable 25 Gram(s) IV Push once  ferrous    sulfate 325 milliGRAM(s) Oral every 8 hours  dextrose 5% + sodium chloride 0.45%. 1000 milliLiter(s) (100 mL/Hr) IV Continuous <Continuous>  carvedilol 3.125 milliGRAM(s) Oral every 12 hours    MEDICATIONS  (PRN):  dextrose Gel 1 Dose(s) Oral once PRN Blood Glucose LESS THAN 70 milliGRAM(s)/deciliter  glucagon  Injectable 1 milliGRAM(s) IntraMuscular once PRN Glucose LESS THAN 70 milligrams/deciliter  LORazepam   Injectable 1 milliGRAM(s) IV Push every 2 hours PRN CIWA-Ar score increase by 2 points and a total score of 7 or less CC: weakness and dark stool    Patient seen and examined at bedside, No acute overnight events. He has been afebrile , denies any chest pain , no nausea or vomiting. Follow  H&H this am, pt pending for GI evaluation today, he is NPO at this time.    Denies fever, chest pain, SOB, abdominal pain, diarrhea, constipation, calf pain.  Patient ambulating, eating well, voiding normally     Cardiac monitor reviewed; no acute events. pt saturation is 99% at room air    VS:   Vital Signs Last 24 Hrs  T(C): 37 (17 Aug 2017 03:56), Max: 37 (16 Aug 2017 21:15)  T(F): 98.6 (17 Aug 2017 03:56), Max: 98.6 (16 Aug 2017 21:15)  HR: 85 (17 Aug 2017 03:56) (85 - 95)  BP: 95/42 (17 Aug 2017 03:56) (95/42 - 111/54)  BP(mean): --  RR: 19 (17 Aug 2017 03:56) (16 - 19)  SpO2: 98% (17 Aug 2017 03:56) (97% - 99%      GENERAL: NAD, well-groomed, well-developed  HEAD:  Atraumatic, Normocephalic  EYES: EOMI, PERRLA, conjunctiva and sclera clear  ENMT: No tonsillar erythema, exudates, or enlargement; Moist mucous membranes, Good dentition, No lesions  NECK: Supple, No JVD, Normal thyroid  LUNG: Clear to auscultation bilaterally; No rales, rhonchi, wheezing, or rubs  HEART: Regular rate and rhythm; No murmurs, rubs, or gallops  ABDOMEN: Soft, Nontender, Nondistended; Bowel sounds present  EXTREMITIES:  2+ Peripheral Pulses, No clubbing, cyanosis, or edema  NERVOUS SYSTEM:  Alert & Oriented X3, Good concentration; Motor Strength 5/5 B/L upper and lower extremities;   SKIN: No rashes or lesions , capillary refill < 2 seconds      Labs:                        5.8    5.8   )-----------( 93       ( 16 Aug 2017 23:05 )             16.7   08-16    139  |  104  |  36.0<H>  ----------------------------<  149<H>  3.6   |  23.0  |  1.04    Ca    8.5<L>      16 Aug 2017 23:05    TPro  5.9<L>  /  Alb  3.2<L>  /  TBili  0.6  /  DBili  x   /  AST  39  /  ALT  19  /  AlkPhos  55  08-16        Radiology:  *Pull    Medications:  MEDICATIONS  (STANDING):  pantoprazole Infusion 8 mG/Hr (10 mL/Hr) IV Continuous <Continuous>  octreotide  Infusion 50 MICROgram(s)/Hr (10 mL/Hr) IV Continuous <Continuous>  insulin lispro (HumaLOG) corrective regimen sliding scale   SubCutaneous three times a day before meals  dextrose 5%. 1000 milliLiter(s) (50 mL/Hr) IV Continuous <Continuous>  dextrose 50% Injectable 12.5 Gram(s) IV Push once  dextrose 50% Injectable 25 Gram(s) IV Push once  dextrose 50% Injectable 25 Gram(s) IV Push once  ferrous    sulfate 325 milliGRAM(s) Oral every 8 hours  dextrose 5% + sodium chloride 0.45%. 1000 milliLiter(s) (100 mL/Hr) IV Continuous <Continuous>  carvedilol 3.125 milliGRAM(s) Oral every 12 hours    MEDICATIONS  (PRN):  dextrose Gel 1 Dose(s) Oral once PRN Blood Glucose LESS THAN 70 milliGRAM(s)/deciliter  glucagon  Injectable 1 milliGRAM(s) IntraMuscular once PRN Glucose LESS THAN 70 milligrams/deciliter  LORazepam   Injectable 1 milliGRAM(s) IV Push every 2 hours PRN CIWA-Ar score increase by 2 points and a total score of 7 or less

## 2017-08-17 NOTE — CONSULT NOTE ADULT - PROBLEM SELECTOR RECOMMENDATION 2
Pt. with h/o alcoholic cirrhosis and questionable h/o esophageal varices. Continue Octreotide and Coreg. Keep NPO for possible EGD later today. Transfuse to Hb of 7 grams or higher.

## 2017-08-17 NOTE — H&P ADULT - PROBLEM SELECTOR PLAN 5
-propanolol 20 mg bid -ISSS  - hemoglobin A1c pending  -monitor blood sugar - pt is not in alcohol withdraw at this time, his CIWA score is: 3   - CIWA protocol with ativan

## 2017-08-17 NOTE — H&P ADULT - NSHPLABSRESULTS_GEN_ALL_CORE
5.8    5.8   )-----------( 93       ( 16 Aug 2017 23:05 )             16.7   08-16    139  |  104  |  36.0<H>  ----------------------------<  149<H>  3.6   |  23.0  |  1.04    Ca    8.5<L>      16 Aug 2017 23:05    TPro  5.9<L>  /  Alb  3.2<L>  /  TBili  0.6  /  DBili  x   /  AST  39  /  ALT  19  /  AlkPhos  55  08-16

## 2017-08-17 NOTE — H&P ADULT - NSHPREVIEWOFSYSTEMS_GEN_ALL_CORE
Positive for : nausea, bloody vomiting, , dark stool,  weakness, fatigue  Negative for : sob, fever, neurologic deficits, calf pain , abdominal pain, chest pain , palpitations Positive for : nausea, dark vomiting, , dark stool,  weakness, fatigue  Negative for : sob, fever, neurologic deficits, calf pain , abdominal pain, palpitations, no diarrhea

## 2017-08-17 NOTE — H&P ADULT - PROBLEM SELECTOR PLAN 6
-ISSS  - hemoglobin A1c pending  -monitor blood sugar -INR = 1.36  -pt was not taken any anticoagulant -INR = 1.36  -pt was not taken any anticoagulant, most likely due to end stage liver disease

## 2017-08-17 NOTE — ED ADULT NURSE REASSESSMENT NOTE - COMFORT CARE
repositioned/wait time explained/warm blanket provided/treatment delay explained/side rails up/plan of care explained

## 2017-08-17 NOTE — PROGRESS NOTE ADULT - ASSESSMENT
Pt is a 81 yr old M with PMH of HTN , DM type 2 ,alcoholic liver cirrhosis with portal hypertension ,esophageal varices who came to the ED due to weakness . Pt report dark colored stools and vomits , his  hb = 5.8, anemia  secondary to upper GI bleeding , pt will be admitted for evaluation and treatment. Pt is a 81 yr old M with PMH of HTN , DM type 2 ,alcoholic liver cirrhosis with portal hypertension ,esophageal varices who came to the ED due to weakness . Pt report dark colored stools and vomits , his  hb = 5.8, anemia  secondary to upper GI bleeding

## 2017-08-17 NOTE — PROGRESS NOTE ADULT - PROBLEM SELECTOR PLAN 7
- platelet level = 93 most likely secondary to cirrhosis  -f/u platelet level
- platelet level = 93 most likely secondary to cirrhosis

## 2017-08-17 NOTE — H&P ADULT - PROBLEM SELECTOR PLAN 4
- - pt is not in alcohol withdraw at this time - CT Abdomen and Pelvis w/ Oral Cont and w/ IV Cont (04.27.17 @ 14:01) >  LIVER: There is morphologic evidence of cirrhosis. There are esophageal   varices.  - physical exam is negative for ascites  - blood ammonia levels  - patient has no asterixis, alert and oriented x 3 for now   - will not add rifaximin currently    - MELDS score:   - CHild-UNDERWOOD score: - pt is not in alcohol withdraw at this time, his CIWA score is: 3   - CIWA protocol with ativan

## 2017-08-17 NOTE — H&P ADULT - PROBLEM SELECTOR PLAN 1
-admit monitored bed , medicine service under Dr. Potter  -naomi mccormick per routine  -diet dash  -ambulate as tolerate  - -admit monitored bed , medicine service under Dr. Potter  -vitals sings per routine  -diet dash  -ambulate as tolerate  - blood transfusion of 2 unit of PRBC  -GI consult -admit monitored bed , medicine service under Dr. Potter  -vitals sings per routine  -diet dash  -bed rest   - blood transfusion of 2 unit of PRBC  -GI consult -admit monitored bed , medicine service under Dr. Potter  -vitals sings per routine  -diet dash  -bed rest   - blood transfusion of 2 unit of PRBC  -GI consult  - - source is most likely variceal bleed due to portal hypertension   - admit monitored bed, medicine service under Dr. Potter  - vitals sings per routine  - diet NPo for now, then consistent carbohydrate  - ambulate with assistance   - fall precautions   - blood transfusion of 2 unit of PRBC  - trend H/H, repeat H/H in 6 hours as patient has no active hematemesis or melena  - patient is on pantoprazole drip, continue for now  -GI consult with Dr. Mcknight

## 2017-08-17 NOTE — H&P ADULT - PROBLEM SELECTOR PLAN 3
- CT Abdomen and Pelvis w/ Oral Cont and w/ IV Cont (04.27.17 @ 14:01) >  LIVER: There is morphologic evidence of cirrhosis. There are esophageal   varices.  -physical exam is negative for ascites - - CT Abdomen and Pelvis w/ Oral Cont and w/ IV Cont (04.27.17 @ 14:01) >  LIVER: There is morphologic evidence of cirrhosis. There are esophageal   varices.  - physical exam is negative for ascites  - blood ammonia levels  - patient has no asterixis, alert and oriented x 3 for now   - will not add rifaximin currently    - MELDS score: 10 points  - CHild-UNDERWOOD score: 6 points

## 2017-08-17 NOTE — H&P ADULT - PROBLEM SELECTOR PLAN 2
-secondary to GI bleeding  -blood transfusion   -iron 325 mg tid  -follow up Hb -secondary to GI bleeding  -blood transfusion of 2 units of PRBC  -iron 325 mg tid  -follow up Hb - patient has symptomatic anemia   secondary to GI bleeding  -blood transfusion of 2 units of PRBC  -iron 325 mg tid  -follow up Hb

## 2017-08-17 NOTE — H&P ADULT - HISTORY OF PRESENT ILLNESS
Pt is a 81 yr old M with PMH of HTN , DM type 2 ,alcoholic liver cirrhosis with portal hypertension ,esophageal varices. Two days ago he had 3-4 vomits with large amount of dark blood , he was unable to quantify the exactly amount, he also report darks stools over the past 4 days that progressively has been improving , today in the ED his stool was positive for blood,  he had dizziness and  nauseas, last vomit was 2 days ago,  pt has been eating less than usually over the past 4 days due to his nauseas. He denies chest pain , abdominal pain , fever , cough, calf pain, sob, no neurologic deficits . Pt is a 81 yr old M with PMH of HTN , DM type 2 ,alcoholic liver cirrhosis with portal hypertension ,esophageal varices. Two days ago he had 3-4 vomits with large amount of dark blood , he was unable to quantify the exactly amount, he also report darks stools over the past 4 days that progressively has been improving , today in the ED his stool was positive for blood,  he has dizziness when he stand up,  nauseas, last vomit was 2 days ago,pt has been eating less than usually over the past 4 days due to his nauseas. He denies chest pain , abdominal pain , fever , cough, calf pain, sob, no neurologic deficits . Pt is a 81 yr old M with PMH of HTN , DM type 2 ,alcoholic liver cirrhosis with portal hypertension ,esophageal varices. Pt came to the ED today and states that 3 days ago he had 3-4 vomits with large amount of dark blood , he was unable to quantify the exactly amount, he also report darks stools over the past 4 days that progressively has been improving , today in the ED his stool was positive for blood,  he has dizziness when he stand up,  nauseas, last vomit was 3 days ago ,pt has been eating less than usually over the past 4 days due to his nauseas. He denies chest pain , abdominal pain , fever , cough, calf pain, sob, no neurologic deficits .

## 2017-08-17 NOTE — H&P ADULT - NSHPSOCIALHISTORY_GEN_ALL_CORE
Pt live in his house , he is divorce , retired, quit smoking 50 yrs ago, denies use of any illegal drugs, pt drinks alcohol daily at least 4 drinks per day ( liquor - beer - wine)

## 2017-08-17 NOTE — CONSULT NOTE ADULT - SUBJECTIVE AND OBJECTIVE BOX
Patient is a 81y old  Male who presents with a chief complaint of weakness and dark stools (17 Aug 2017 01:44)      HPI:  Pt is a 81 yr old M with PMH of HTN , DM type 2 ,alcoholic liver cirrhosis with portal hypertension ,esophageal varices. Pt came to the ED today and states that 3 days ago he had 3-4 vomits with large amount of dark blood , he was unable to quantify the exactly amount, he also report darks stools over the past 4 days that progressively has been improving , today in the ED his stool was positive for blood,  he has dizziness when he stand up,  nauseas, last vomit was 3 days ago ,pt has been eating less than usually over the past 4 days due to his nauseas. He denies chest pain , abdominal pain , fever , cough, calf pain, sob, no neurologic deficits . (17 Aug 2017 01:44). He still drinks and his last ETOH consumption was Sunday before he became ill.      REVIEW OF SYSTEMS:  Constitutional: No fever, weight loss or fatigue  ENMT:  No difficulty hearing, tinnitus, vertigo; No sinus or throat pain  Respiratory: No cough, wheezing, chills or hemoptysis  Cardiovascular: No chest pain, palpitations, dizziness or leg swelling  Gastrointestinal: As per HPI. No abdominal or epigastric pain; No diarrhea or constipation. No melena or hematochezia.  Skin: No itching, burning, rashes or lesions   Musculoskeletal: No joint pain or swelling; No muscle, back or extremity pain  Patient has no cardiopulmonary, peripheral vascular, musculoskeletal, dermatological, neurological, or psychological symptoms or complaints at this time    PAST MEDICAL & SURGICAL HISTORY:  HTN (hypertension)  Cirrhosis of liver  DM (diabetes mellitus)  S/P knee surgery      FAMILY HISTORY:  No pertinent family history in first degree relatives      SOCIAL HISTORY:  Smoking Status: [ ] Current, [ ] Former, [x ] Never  Pack Years: N/A. + ETOH - drug abuse history.    MEDICATIONS:  MEDICATIONS  (STANDING):  pantoprazole Infusion 8 mG/Hr (10 mL/Hr) IV Continuous <Continuous>  octreotide  Infusion 50 MICROgram(s)/Hr (10 mL/Hr) IV Continuous <Continuous>  insulin lispro (HumaLOG) corrective regimen sliding scale   SubCutaneous three times a day before meals  dextrose 5%. 1000 milliLiter(s) (50 mL/Hr) IV Continuous <Continuous>  dextrose 50% Injectable 12.5 Gram(s) IV Push once  dextrose 50% Injectable 25 Gram(s) IV Push once  dextrose 50% Injectable 25 Gram(s) IV Push once  ferrous    sulfate 325 milliGRAM(s) Oral every 8 hours  dextrose 5% + sodium chloride 0.45%. 1000 milliLiter(s) (100 mL/Hr) IV Continuous <Continuous>  carvedilol 3.125 milliGRAM(s) Oral every 12 hours    MEDICATIONS  (PRN):  dextrose Gel 1 Dose(s) Oral once PRN Blood Glucose LESS THAN 70 milliGRAM(s)/deciliter  glucagon  Injectable 1 milliGRAM(s) IntraMuscular once PRN Glucose LESS THAN 70 milligrams/deciliter  LORazepam   Injectable 1 milliGRAM(s) IV Push every 2 hours PRN CIWA-Ar score increase by 2 points and a total score of 7 or less      Allergies    No Known Allergies    Intolerances        Vital Signs Last 24 Hrs  T(C): 36.9 (17 Aug 2017 05:30), Max: 37 (16 Aug 2017 21:15)  T(F): 98.4 (17 Aug 2017 05:30), Max: 98.6 (16 Aug 2017 21:15)  HR: 76 (17 Aug 2017 06:49) (76 - 95)  BP: 106/51 (17 Aug 2017 06:49) (95/42 - 111/54)  BP(mean): --  RR: 16 (17 Aug 2017 06:49) (16 - 19)  SpO2: 99% (17 Aug 2017 06:49) (97% - 99%)        PHYSICAL EXAM:    General: Well developed; well nourished; in no acute distress  HEENT: MMM, conjunctiva and sclera clear  Gastrointestinal: Soft, non-tender non-distended; Normal bowel sounds; No rebound or guarding or HSM  MAI: Brown stool  Extremities: Normal range of motion, No clubbing, cyanosis or edema  Neurological: Alert and oriented x3  Skin: Warm and dry. No obvious rash      LABS:                        5.8    5.8   )-----------( 93       ( 16 Aug 2017 23:05 )             16.7     08-16    139  |  104  |  36.0<H>  ----------------------------<  149<H>  3.6   |  23.0  |  1.04    Ca    8.5<L>      16 Aug 2017 23:05    TPro  5.9<L>  /  Alb  3.2<L>  /  TBili  0.6  /  DBili  x   /  AST  39  /  ALT  19  /  AlkPhos  55  08-16          RADIOLOGY & ADDITIONAL STUDIES:

## 2017-08-17 NOTE — PROGRESS NOTE ADULT - SUBJECTIVE AND OBJECTIVE BOX
MAGED READ    [id number]    81y      Male    INTERVAL HPI/OVERNIGHT EVENTS:  Pt is a 81 yr old M with PMH of HTN , DM type 2 ,alcoholic liver cirrhosis with portal hypertension ,esophageal varices. Pt came to the ED today and states that 3 days ago he had 3-4 vomits with large amount of dark blood , he was unable to quantify the exactly amount, he also report darks stools over the past 4 days that progressively has been improving , today in the ED his stool was positive for blood,  he has dizziness when he stand up,  nauseas, last vomit was 3 days ago ,pt has been eating less than usually over the past 4 days due to his nauseas. He denies chest pain , abdominal pain , fever , cough, calf pain, sob, no neurologic deficits .    This morning, pt is resting comfortably in bed.  C/o dryness and intermittent discomfort in throat/ chest.  Denies SOB.  No nausea/ vomiting currently.  Pt reports he has 3-4 drinks/ day.  He states he can stop drinking on his own and is not interested in rehab.  Dr. Kelly explained the importance of not drinking Etoh and possible consequences of esophageal varices and portal hypertension, including death.  Pt is scheduled for EGD today.    REVIEW OF SYSTEMS:    CONSTITUTIONAL: No fever, weight loss, or fatigue  RESPIRATORY: No cough, wheezing, hemoptysis; No shortness of breath  CARDIOVASCULAR: No chest pain, palpitations  GASTROINTESTINAL: + abdominal or epigastric pain. +nausea, + vomiting  NEUROLOGICAL: No headaches, memory loss, loss of strength.      Vital Signs Last 24 Hrs  T(C): 37.3 (17 Aug 2017 09:22), Max: 37.3 (17 Aug 2017 09:22)  T(F): 99.1 (17 Aug 2017 09:22), Max: 99.1 (17 Aug 2017 09:22)  HR: 84 (17 Aug 2017 09:22) (76 - 95)  BP: 138/64 (17 Aug 2017 09:22) (95/42 - 138/64)  RR: 18 (17 Aug 2017 09:22) (16 - 19)  SpO2: 95% (17 Aug 2017 09:22) (95% - 99%)    PHYSICAL EXAM:    GENERAL: NAD, elderly male  HEENT: PERRL, +EOMI  NECK: soft, Supple, No JVD,   CHEST/LUNG: Clear to ascultation bilaterally; No wheezing  HEART: S1S2+, Regular rate and rhythm; No murmurs, rubs, or gallops  ABDOMEN: Soft, Nontender, Nondistended; Bowel sounds present  EXTREMITIES:  2+ Peripheral Pulses, No clubbing, cyanosis, or edema  SKIN: No rashes or lesions  NEURO: AAOX3, no focal deficits, + minor hand tremors  PSYCH: normal mood      LABS:                        5.8    5.8   )-----------( 93       ( 16 Aug 2017 23:05 )             16.7     08-16    139  |  104  |  36.0<H>  ----------------------------<  149<H>  3.6   |  23.0  |  1.04    Ca    8.5<L>      16 Aug 2017 23:05    TPro  5.9<L>  /  Alb  3.2<L>  /  TBili  0.6  /  DBili  x   /  AST  39  /  ALT  19  /  AlkPhos  55  08-16    PT/INR - ( 16 Aug 2017 23:05 )   PT: 15.0 sec;   INR: 1.36 ratio                 MEDICATIONS  (STANDING):  pantoprazole Infusion 8 mG/Hr (10 mL/Hr) IV Continuous <Continuous>  octreotide  Infusion 50 MICROgram(s)/Hr (10 mL/Hr) IV Continuous <Continuous>  insulin lispro (HumaLOG) corrective regimen sliding scale   SubCutaneous three times a day before meals  dextrose 5%. 1000 milliLiter(s) (50 mL/Hr) IV Continuous <Continuous>  dextrose 50% Injectable 12.5 Gram(s) IV Push once  dextrose 50% Injectable 25 Gram(s) IV Push once  dextrose 50% Injectable 25 Gram(s) IV Push once  ferrous    sulfate 325 milliGRAM(s) Oral every 8 hours  dextrose 5% + sodium chloride 0.45%. 1000 milliLiter(s) (100 mL/Hr) IV Continuous <Continuous>  carvedilol 3.125 milliGRAM(s) Oral every 12 hours    MEDICATIONS  (PRN):  dextrose Gel 1 Dose(s) Oral once PRN Blood Glucose LESS THAN 70 milliGRAM(s)/deciliter  glucagon  Injectable 1 milliGRAM(s) IntraMuscular once PRN Glucose LESS THAN 70 milligrams/deciliter  LORazepam   Injectable 1 milliGRAM(s) IV Push every 2 hours PRN CIWA-Ar score increase by 2 points and a total score of 7 or less    GI consult appreciated

## 2017-08-17 NOTE — ED ADULT NURSE REASSESSMENT NOTE - TREMOR
4=moderate with patient's arms extended

## 2017-08-17 NOTE — PROGRESS NOTE ADULT - ASSESSMENT
Pt is a 81 yr old M with PMH of HTN , DM type 2 ,alcoholic liver cirrhosis with portal hypertension ,possible esophageal varices who came to the ED due to weakness . Pt report dark colored stools and vomits , his  hb = 5.8, anemia  secondary to upper GI bleeding , Pt is scheduled for EGD today

## 2017-08-17 NOTE — BRIEF OPERATIVE NOTE - POST-OP DX
Alcoholic gastritis with hemorrhage, unspecified chronicity  08/17/2017    Active  [place] grade D esophagitis  08/17/2017  Severe erosive esophagitis from 34 to 40 cm.  Active  [place]

## 2017-08-17 NOTE — ED ADULT NURSE REASSESSMENT NOTE - NS ED NURSE REASSESS COMMENT FT1
pt A&OX4, resp even and unlabored no distress noted, VSS as documented, PRBC infusing pt tolerating well, cardiac monitor in place pt has no complaints at this time, will continue to monitor
pt A&Ox4, resp even and unlabored no distress noted, pt denies any pain and has no complaints at this time, cardiac monitor in place, 1st unit of PRBC started VSS as documented, will continue to monitor
Late entry : Pt received in the stretcher , no distress noted , no chest pain reported at this time, VSS, Second Unit of PRB's infusing  well, pt tolerating blood transfusion well, no adverse reaction noted , pt moved to the B side , verbal report given to bao Moe  nurse, pt care endorsed at this time
MD Turner made aware of CIWA-9 MD at bedside medicated as ordered will continue to monitor
pt A&Ox4, resp even and unlabored no distress noted, 2nd PRBC infusion started, cardiac monitor in place, VSS as documented, will continue to monitor

## 2017-08-17 NOTE — H&P ADULT - ASSESSMENT
Pt is a 81 yr old M with PMH of HTN , DM type 2 ,alcoholic liver cirrhosis with portal hypertension ,esophageal varices who came to the ED due to weakness . Pt hb = 5.8 secondary to upper GI bleeding , pt will be admitted for evaluation and treatment. Pt is a 81 yr old M with PMH of HTN , DM type 2 ,alcoholic liver cirrhosis with portal hypertension ,esophageal varices who came to the ED due to weakness . Pt report dark colored stools and vomits , his  hb = 5.8 secondary to upper GI bleeding , pt will be admitted for evaluation and treatment. Pt is a 81 yr old M with PMH of HTN , DM type 2 ,alcoholic liver cirrhosis with portal hypertension ,esophageal varices who came to the ED due to weakness . Pt report dark colored stools and vomits , his  hb = 5.8, anemia  secondary to upper GI bleeding , pt will be admitted for evaluation and treatment.

## 2017-08-17 NOTE — H&P ADULT - NSHPPHYSICALEXAM_GEN_ALL_CORE
GENERAL: NAD, well-groomed, well-developed  HEAD:  Atraumatic, Normocephalic, scar over left cheek  EYES: EOMI, PERRLA, conjunctiva and sclera clear  ENMT: No tonsillar erythema, exudates, or enlargement; Moist mucous membranes, No lesions  NECK: Supple, No JVD, Normal thyroid  LUNG: Clear to auscultation bilaterally; No rales, rhonchi, wheezing, or rubs  HEART: Regular rate and rhythm; positive murmurs over LUSB ( aortic stenosis), no  rubs, or gallops  ABDOMEN: Soft, Nontender, Nondistended; Bowel sounds present  EXTREMITIES:  2+ Peripheral Pulses, No clubbing, cyanosis, or edema  LYMPH: No lymphadenopathy noted  NERVOUS SYSTEM:  Alert & Oriented X3, Good concentration; Motor Strength 5/5 B/L upper and lower extremities; DTRs 2+ intact and symmetric  SKIN: No rashes or lesions GENERAL: NAD, well-groomed, well-developed  HEAD:  Atraumatic, Normocephalic, scar over left cheek  EYES: EOMI, PERRLA, conjunctiva and sclera clear  ENMT: No tonsillar erythema, exudates, or enlargement; Moist mucous membranes, No lesions  NECK: Supple, No JVD, Normal thyroid  LUNG: Clear to auscultation bilaterally; No rales, rhonchi, wheezing, or rubs  HEART: Regular rate and rhythm; positive murmurs over LUSB ( aortic stenosis), no  rubs, or gallops  ABDOMEN: Soft, Nontender, Nondistended; Bowel sounds present  EXTREMITIES:  2+ Peripheral Pulses, No clubbing, cyanosis, or edema  LYMPH: No lymphadenopathy noted  NERVOUS SYSTEM:  Alert & Oriented X3, Good concentration; Motor Strength 5/5 B/L upper and lower extremities, positive resting tremor in both upper extremities   SKIN: No rashes or lesions

## 2017-08-17 NOTE — PROGRESS NOTE ADULT - PROBLEM SELECTOR PLAN 8
- intermittent pneumatic compression  - no Lovenox pt has GI bleeding
- intermittent pneumatic compression

## 2017-08-17 NOTE — CONSULT NOTE ADULT - PROBLEM SELECTOR RECOMMENDATION 9
Possible variceal hemorrhage. Keep NPO for possible EGD later today. Transfuse to Hb of 7 grams or higher. Same IV Octreotide therapy.

## 2017-08-17 NOTE — H&P ADULT - PROBLEM SELECTOR PLAN 7
-INR = 1.36  -pt was not taken any anticoagulant - platelet level = 93 most likely secondary to cirrhosis -INR = 1.36  -pt was not taken any anticoagulant, most likely due to end stage liver disease

## 2017-08-18 ENCOUNTER — TRANSCRIPTION ENCOUNTER (OUTPATIENT)
Age: 82
End: 2017-08-18

## 2017-08-18 DIAGNOSIS — K20.8 OTHER ESOPHAGITIS: ICD-10-CM

## 2017-08-18 DIAGNOSIS — K29.21 ALCOHOLIC GASTRITIS WITH BLEEDING: ICD-10-CM

## 2017-08-18 LAB
ALBUMIN SERPL ELPH-MCNC: 2.9 G/DL — LOW (ref 3.3–5.2)
ALP SERPL-CCNC: 48 U/L — SIGNIFICANT CHANGE UP (ref 40–120)
ALT FLD-CCNC: 24 U/L — SIGNIFICANT CHANGE UP
ANION GAP SERPL CALC-SCNC: 8 MMOL/L — SIGNIFICANT CHANGE UP (ref 5–17)
ANION GAP SERPL CALC-SCNC: 9 MMOL/L — SIGNIFICANT CHANGE UP (ref 5–17)
AST SERPL-CCNC: 44 U/L — HIGH
BASOPHILS # BLD AUTO: 0 K/UL — SIGNIFICANT CHANGE UP (ref 0–0.2)
BASOPHILS NFR BLD AUTO: 0.2 % — SIGNIFICANT CHANGE UP (ref 0–2)
BILIRUB SERPL-MCNC: 1.2 MG/DL — SIGNIFICANT CHANGE UP (ref 0.4–2)
BUN SERPL-MCNC: 20 MG/DL — SIGNIFICANT CHANGE UP (ref 8–20)
BUN SERPL-MCNC: 20 MG/DL — SIGNIFICANT CHANGE UP (ref 8–20)
CALCIUM SERPL-MCNC: 7.9 MG/DL — LOW (ref 8.6–10.2)
CALCIUM SERPL-MCNC: 8 MG/DL — LOW (ref 8.6–10.2)
CHLORIDE SERPL-SCNC: 103 MMOL/L — SIGNIFICANT CHANGE UP (ref 98–107)
CHLORIDE SERPL-SCNC: 104 MMOL/L — SIGNIFICANT CHANGE UP (ref 98–107)
CO2 SERPL-SCNC: 25 MMOL/L — SIGNIFICANT CHANGE UP (ref 22–29)
CO2 SERPL-SCNC: 25 MMOL/L — SIGNIFICANT CHANGE UP (ref 22–29)
CREAT SERPL-MCNC: 1.01 MG/DL — SIGNIFICANT CHANGE UP (ref 0.5–1.3)
CREAT SERPL-MCNC: 1.02 MG/DL — SIGNIFICANT CHANGE UP (ref 0.5–1.3)
EOSINOPHIL # BLD AUTO: 0.2 K/UL — SIGNIFICANT CHANGE UP (ref 0–0.5)
EOSINOPHIL NFR BLD AUTO: 3.5 % — SIGNIFICANT CHANGE UP (ref 0–5)
GLUCOSE SERPL-MCNC: 153 MG/DL — HIGH (ref 70–115)
GLUCOSE SERPL-MCNC: 153 MG/DL — HIGH (ref 70–115)
HBA1C BLD-MCNC: 5.5 % — SIGNIFICANT CHANGE UP (ref 4–5.6)
HCT VFR BLD CALC: 21.9 % — LOW (ref 42–52)
HGB BLD-MCNC: 7.5 G/DL — LOW (ref 14–18)
LYMPHOCYTES # BLD AUTO: 1.2 K/UL — SIGNIFICANT CHANGE UP (ref 1–4.8)
LYMPHOCYTES # BLD AUTO: 25.5 % — SIGNIFICANT CHANGE UP (ref 20–55)
MAGNESIUM SERPL-MCNC: 1.7 MG/DL — SIGNIFICANT CHANGE UP (ref 1.6–2.6)
MCHC RBC-ENTMCNC: 32.5 PG — HIGH (ref 27–31)
MCHC RBC-ENTMCNC: 34.2 G/DL — SIGNIFICANT CHANGE UP (ref 32–36)
MCV RBC AUTO: 94.8 FL — HIGH (ref 80–94)
MONOCYTES # BLD AUTO: 0.7 K/UL — SIGNIFICANT CHANGE UP (ref 0–0.8)
MONOCYTES NFR BLD AUTO: 14.9 % — HIGH (ref 3–10)
NEUTROPHILS # BLD AUTO: 2.5 K/UL — SIGNIFICANT CHANGE UP (ref 1.8–8)
NEUTROPHILS NFR BLD AUTO: 55.7 % — SIGNIFICANT CHANGE UP (ref 37–73)
PHOSPHATE SERPL-MCNC: 2.6 MG/DL — SIGNIFICANT CHANGE UP (ref 2.4–4.7)
PLATELET # BLD AUTO: 71 K/UL — LOW (ref 150–400)
POTASSIUM SERPL-MCNC: 3.7 MMOL/L — SIGNIFICANT CHANGE UP (ref 3.5–5.3)
POTASSIUM SERPL-MCNC: 3.7 MMOL/L — SIGNIFICANT CHANGE UP (ref 3.5–5.3)
POTASSIUM SERPL-SCNC: 3.7 MMOL/L — SIGNIFICANT CHANGE UP (ref 3.5–5.3)
POTASSIUM SERPL-SCNC: 3.7 MMOL/L — SIGNIFICANT CHANGE UP (ref 3.5–5.3)
PROT SERPL-MCNC: 5.6 G/DL — LOW (ref 6.6–8.7)
RBC # BLD: 2.31 M/UL — LOW (ref 4.6–6.2)
RBC # FLD: 18.5 % — HIGH (ref 11–15.6)
SODIUM SERPL-SCNC: 136 MMOL/L — SIGNIFICANT CHANGE UP (ref 135–145)
SODIUM SERPL-SCNC: 138 MMOL/L — SIGNIFICANT CHANGE UP (ref 135–145)
WBC # BLD: 4.5 K/UL — LOW (ref 4.8–10.8)
WBC # FLD AUTO: 4.5 K/UL — LOW (ref 4.8–10.8)

## 2017-08-18 PROCEDURE — 99233 SBSQ HOSP IP/OBS HIGH 50: CPT

## 2017-08-18 PROCEDURE — 99232 SBSQ HOSP IP/OBS MODERATE 35: CPT

## 2017-08-18 RX ORDER — PANTOPRAZOLE SODIUM 20 MG/1
40 TABLET, DELAYED RELEASE ORAL EVERY 12 HOURS
Qty: 0 | Refills: 0 | Status: DISCONTINUED | OUTPATIENT
Start: 2017-08-18 | End: 2017-08-19

## 2017-08-18 RX ORDER — FUROSEMIDE 40 MG
40 TABLET ORAL ONCE
Qty: 0 | Refills: 0 | Status: COMPLETED | OUTPATIENT
Start: 2017-08-18 | End: 2017-08-18

## 2017-08-18 RX ADMIN — SODIUM CHLORIDE 100 MILLILITER(S): 9 INJECTION, SOLUTION INTRAVENOUS at 18:07

## 2017-08-18 RX ADMIN — SODIUM CHLORIDE 100 MILLILITER(S): 9 INJECTION, SOLUTION INTRAVENOUS at 01:29

## 2017-08-18 RX ADMIN — Medication 325 MILLIGRAM(S): at 12:48

## 2017-08-18 RX ADMIN — PANTOPRAZOLE SODIUM 40 MILLIGRAM(S): 20 TABLET, DELAYED RELEASE ORAL at 18:11

## 2017-08-18 RX ADMIN — Medication 325 MILLIGRAM(S): at 21:53

## 2017-08-18 RX ADMIN — Medication 40 MILLIGRAM(S): at 12:47

## 2017-08-18 RX ADMIN — Medication 1: at 10:42

## 2017-08-18 RX ADMIN — Medication 1: at 12:48

## 2017-08-18 RX ADMIN — Medication 325 MILLIGRAM(S): at 05:42

## 2017-08-18 NOTE — PROGRESS NOTE ADULT - ASSESSMENT
81 yr old male with alcohol abuse, hypertension, diabetes mellitus admitted with 3-4 episodes of vomiting dark blood. CT abdomen showed evidence of cirrhosis. Noted to have Hb 5.8, GI was consulted. He was 81 yr old male with alcohol abuse, hypertension, diabetes mellitus admitted with 3-4 episodes of vomiting dark blood. CT abdomen showed evidence of cirrhosis. Noted to have Hb 5.8, GI was consulted. He was given 2 units of PRBC. EGD was done by GI showed severe esophagitis with necrotic ulcerations. He was started on Ativan prn for withdrawal. His Hb improved to 7.5, 2 more units of PRBC. Social work consulted for alcohol withdrawal.

## 2017-08-18 NOTE — PROGRESS NOTE ADULT - SUBJECTIVE AND OBJECTIVE BOX
INTERVAL HPI/OVERNIGHT EVENTS:    CC: anemia secondary to upper GI bleed secondary to esophagitis, alcohol abuse, hypertension, diabetes mellitus    No overnight events, denies complaints. Ate regular diet this am, no nausea/vomiting.     Vital Signs Last 24 Hrs  T(C): 36.9 (18 Aug 2017 05:41), Max: 37 (17 Aug 2017 14:22)  T(F): 98.4 (18 Aug 2017 05:41), Max: 98.6 (17 Aug 2017 14:22)  HR: 82 (18 Aug 2017 05:41) (75 - 82)  BP: 100/50 (18 Aug 2017 05:41) (100/50 - 132/66)  BP(mean): --  RR: 18 (18 Aug 2017 05:41) (16 - 20)  SpO2: 98% (18 Aug 2017 05:41) (96% - 100%)    PHYSICAL EXAM:    GENERAL: Not in distress, alert, + hand tremors  CHEST/LUNG: b/l air entry, clear  HEART: Regular  ABDOMEN: Soft, BS+, non tender  EXTREMITIES: no edema, tenderness    MEDICATIONS  (STANDING):  insulin lispro (HumaLOG) corrective regimen sliding scale   SubCutaneous three times a day before meals  dextrose 5%. 1000 milliLiter(s) (50 mL/Hr) IV Continuous <Continuous>  dextrose 50% Injectable 12.5 Gram(s) IV Push once  dextrose 50% Injectable 25 Gram(s) IV Push once  dextrose 50% Injectable 25 Gram(s) IV Push once  ferrous    sulfate 325 milliGRAM(s) Oral every 8 hours  dextrose 5% + sodium chloride 0.45%. 1000 milliLiter(s) (100 mL/Hr) IV Continuous <Continuous>  pantoprazole  Injectable 40 milliGRAM(s) IV Push every 12 hours    MEDICATIONS  (PRN):  dextrose Gel 1 Dose(s) Oral once PRN Blood Glucose LESS THAN 70 milliGRAM(s)/deciliter  glucagon  Injectable 1 milliGRAM(s) IntraMuscular once PRN Glucose LESS THAN 70 milligrams/deciliter  LORazepam   Injectable 1 milliGRAM(s) IV Push every 2 hours PRN CIWA-Ar score increase by 2 points and a total score of 7 or less  ondansetron Injectable 4 milliGRAM(s) IV Push every 6 hours PRN Nausea      Allergies    No Known Allergies    Intolerances          LABS:                          7.5    4.5   )-----------( 71       ( 18 Aug 2017 07:26 )             21.9     08-18    138  |  104  |  20.0  ----------------------------<  153<H>  3.7   |  25.0  |  1.01    Ca    8.0<L>      18 Aug 2017 07:26  Phos  2.6     08-18  Mg     1.7     08-18    TPro  5.6<L>  /  Alb  2.9<L>  /  TBili  1.2  /  DBili  x   /  AST  44<H>  /  ALT  24  /  AlkPhos  48  08-18    PT/INR - ( 17 Aug 2017 11:54 )   PT: 14.5 sec;   INR: 1.31 ratio               RADIOLOGY & ADDITIONAL TESTS:

## 2017-08-18 NOTE — PROGRESS NOTE ADULT - SUBJECTIVE AND OBJECTIVE BOX
Pt seen and examined. He is s/p an EGD yesterday which showed severe erosive esophagitis and gastritis secondary to ETOH. No varices seen. He has had no further hematemesis post EGD.    MEDICATIONS:  MEDICATIONS  (STANDING):  pantoprazole Infusion 8 mG/Hr (10 mL/Hr) IV Continuous <Continuous>  insulin lispro (HumaLOG) corrective regimen sliding scale   SubCutaneous three times a day before meals  dextrose 5%. 1000 milliLiter(s) (50 mL/Hr) IV Continuous <Continuous>  dextrose 50% Injectable 12.5 Gram(s) IV Push once  dextrose 50% Injectable 25 Gram(s) IV Push once  dextrose 50% Injectable 25 Gram(s) IV Push once  ferrous    sulfate 325 milliGRAM(s) Oral every 8 hours  dextrose 5% + sodium chloride 0.45%. 1000 milliLiter(s) (100 mL/Hr) IV Continuous <Continuous>    MEDICATIONS  (PRN):  dextrose Gel 1 Dose(s) Oral once PRN Blood Glucose LESS THAN 70 milliGRAM(s)/deciliter  glucagon  Injectable 1 milliGRAM(s) IntraMuscular once PRN Glucose LESS THAN 70 milligrams/deciliter  LORazepam   Injectable 1 milliGRAM(s) IV Push every 2 hours PRN CIWA-Ar score increase by 2 points and a total score of 7 or less  ondansetron Injectable 4 milliGRAM(s) IV Push every 6 hours PRN Nausea      Allergies    No Known Allergies    Intolerances        Vital Signs Last 24 Hrs  T(C): 36.9 (18 Aug 2017 05:41), Max: 37.3 (17 Aug 2017 09:22)  T(F): 98.4 (18 Aug 2017 05:41), Max: 99.1 (17 Aug 2017 09:22)  HR: 82 (18 Aug 2017 05:41) (75 - 84)  BP: 100/50 (18 Aug 2017 05:41) (100/50 - 146/71)  BP(mean): --  RR: 18 (18 Aug 2017 05:41) (16 - 20)  SpO2: 98% (18 Aug 2017 05:41) (95% - 100%)    08-17 @ 07:01  -  08-18 @ 07:00  --------------------------------------------------------  IN: 2820 mL / OUT: 350 mL / NET: 2470 mL        PHYSICAL EXAM:    General: Well developed; well nourished; in no acute distress  HEENT: MMM, conjunctiva pink and sclera anicteric.  Lungs: clear to auscultation and percussion.  Cor: RRR S1, S2 only  Gastrointestinal: Abdomen: Soft non-tender non-distended; Normal bowel sounds; No hepatosplenomegaly  Extremities: no cyanosis, clubbing or edema.  Skin: Warm and dry. No obvious rash  Neuro: Pt. a + o x 3    LABS:      CBC Full  -  ( 17 Aug 2017 11:54 )  WBC Count : 4.9 K/uL  Hemoglobin : 7.9 g/dL  Hematocrit : 22.7 %  Platelet Count - Automated : 73 K/uL  Mean Cell Volume : 93.0 fl  Mean Cell Hemoglobin : 32.4 pg  Mean Cell Hemoglobin Concentration : 34.8 g/dL  Auto Neutrophil # : 2.9 K/uL  Auto Lymphocyte # : 1.2 K/uL  Auto Monocyte # : 0.6 K/uL  Auto Eosinophil # : 0.1 K/uL  Auto Basophil # : 0.0 K/uL  Auto Neutrophil % : 59.7 %  Auto Lymphocyte % : 25.4 %  Auto Monocyte % : 12.3 %  Auto Eosinophil % : 2.2 %  Auto Basophil % : 0.2 %    08-17    141  |  104  |  31.0<H>  ----------------------------<  159<H>  3.8   |  24.0  |  1.04    Ca    8.1<L>      17 Aug 2017 11:54  Phos  3.8     08-17  Mg     1.9     08-17    TPro  5.9<L>  /  Alb  3.3  /  TBili  2.2<H>  /  DBili  0.7<H>  /  AST  46<H>  /  ALT  23  /  AlkPhos  53  08-17    PT/INR - ( 17 Aug 2017 11:54 )   PT: 14.5 sec;   INR: 1.31 ratio                           RADIOLOGY & ADDITIONAL STUDIES (The following images were personally reviewed):

## 2017-08-19 DIAGNOSIS — E87.6 HYPOKALEMIA: ICD-10-CM

## 2017-08-19 DIAGNOSIS — E83.42 HYPOMAGNESEMIA: ICD-10-CM

## 2017-08-19 LAB
ALBUMIN SERPL ELPH-MCNC: 2.9 G/DL — LOW (ref 3.3–5.2)
ALP SERPL-CCNC: 50 U/L — SIGNIFICANT CHANGE UP (ref 40–120)
ALT FLD-CCNC: 22 U/L — SIGNIFICANT CHANGE UP
ANION GAP SERPL CALC-SCNC: 11 MMOL/L — SIGNIFICANT CHANGE UP (ref 5–17)
AST SERPL-CCNC: 36 U/L — SIGNIFICANT CHANGE UP
BASOPHILS # BLD AUTO: 0 K/UL — SIGNIFICANT CHANGE UP (ref 0–0.2)
BASOPHILS NFR BLD AUTO: 0.2 % — SIGNIFICANT CHANGE UP (ref 0–2)
BILIRUB SERPL-MCNC: 1.2 MG/DL — SIGNIFICANT CHANGE UP (ref 0.4–2)
BLD GP AB SCN SERPL QL: SIGNIFICANT CHANGE UP
BUN SERPL-MCNC: 15 MG/DL — SIGNIFICANT CHANGE UP (ref 8–20)
CALCIUM SERPL-MCNC: 7.6 MG/DL — LOW (ref 8.6–10.2)
CHLORIDE SERPL-SCNC: 102 MMOL/L — SIGNIFICANT CHANGE UP (ref 98–107)
CO2 SERPL-SCNC: 23 MMOL/L — SIGNIFICANT CHANGE UP (ref 22–29)
CREAT SERPL-MCNC: 0.9 MG/DL — SIGNIFICANT CHANGE UP (ref 0.5–1.3)
EOSINOPHIL # BLD AUTO: 0.2 K/UL — SIGNIFICANT CHANGE UP (ref 0–0.5)
EOSINOPHIL NFR BLD AUTO: 4.3 % — SIGNIFICANT CHANGE UP (ref 0–5)
GLUCOSE SERPL-MCNC: 171 MG/DL — HIGH (ref 70–115)
HCT VFR BLD CALC: 25.9 % — LOW (ref 42–52)
HGB BLD-MCNC: 9 G/DL — LOW (ref 14–18)
LYMPHOCYTES # BLD AUTO: 1.2 K/UL — SIGNIFICANT CHANGE UP (ref 1–4.8)
LYMPHOCYTES # BLD AUTO: 23 % — SIGNIFICANT CHANGE UP (ref 20–55)
MAGNESIUM SERPL-MCNC: 1.5 MG/DL — LOW (ref 1.6–2.6)
MCHC RBC-ENTMCNC: 32 PG — HIGH (ref 27–31)
MCHC RBC-ENTMCNC: 34.7 G/DL — SIGNIFICANT CHANGE UP (ref 32–36)
MCV RBC AUTO: 92.2 FL — SIGNIFICANT CHANGE UP (ref 80–94)
MONOCYTES # BLD AUTO: 0.9 K/UL — HIGH (ref 0–0.8)
MONOCYTES NFR BLD AUTO: 17.4 % — HIGH (ref 3–10)
NEUTROPHILS # BLD AUTO: 2.8 K/UL — SIGNIFICANT CHANGE UP (ref 1.8–8)
NEUTROPHILS NFR BLD AUTO: 3 % — LOW (ref 37–73)
PHOSPHATE SERPL-MCNC: 2.5 MG/DL — SIGNIFICANT CHANGE UP (ref 2.4–4.7)
PLATELET # BLD AUTO: 67 K/UL — LOW (ref 150–400)
POTASSIUM SERPL-MCNC: 3.4 MMOL/L — LOW (ref 3.5–5.3)
POTASSIUM SERPL-SCNC: 3.4 MMOL/L — LOW (ref 3.5–5.3)
PROT SERPL-MCNC: 5.6 G/DL — LOW (ref 6.6–8.7)
RBC # BLD: 2.81 M/UL — LOW (ref 4.6–6.2)
RBC # FLD: 17.1 % — HIGH (ref 11–15.6)
SODIUM SERPL-SCNC: 136 MMOL/L — SIGNIFICANT CHANGE UP (ref 135–145)
TYPE + AB SCN PNL BLD: SIGNIFICANT CHANGE UP
WBC # BLD: 5.2 K/UL — SIGNIFICANT CHANGE UP (ref 4.8–10.8)
WBC # FLD AUTO: 5.2 K/UL — SIGNIFICANT CHANGE UP (ref 4.8–10.8)

## 2017-08-19 PROCEDURE — 99232 SBSQ HOSP IP/OBS MODERATE 35: CPT

## 2017-08-19 PROCEDURE — 99233 SBSQ HOSP IP/OBS HIGH 50: CPT

## 2017-08-19 RX ORDER — POTASSIUM CHLORIDE 20 MEQ
40 PACKET (EA) ORAL ONCE
Qty: 0 | Refills: 0 | Status: DISCONTINUED | OUTPATIENT
Start: 2017-08-19 | End: 2017-08-19

## 2017-08-19 RX ORDER — MAGNESIUM SULFATE 500 MG/ML
2 VIAL (ML) INJECTION ONCE
Qty: 0 | Refills: 0 | Status: COMPLETED | OUTPATIENT
Start: 2017-08-19 | End: 2017-08-19

## 2017-08-19 RX ORDER — PANTOPRAZOLE SODIUM 20 MG/1
40 TABLET, DELAYED RELEASE ORAL
Qty: 0 | Refills: 0 | Status: DISCONTINUED | OUTPATIENT
Start: 2017-08-19 | End: 2017-08-20

## 2017-08-19 RX ORDER — POTASSIUM CHLORIDE 20 MEQ
40 PACKET (EA) ORAL ONCE
Qty: 0 | Refills: 0 | Status: COMPLETED | OUTPATIENT
Start: 2017-08-19 | End: 2017-08-19

## 2017-08-19 RX ADMIN — Medication 50 GRAM(S): at 10:10

## 2017-08-19 RX ADMIN — PANTOPRAZOLE SODIUM 40 MILLIGRAM(S): 20 TABLET, DELAYED RELEASE ORAL at 05:24

## 2017-08-19 RX ADMIN — Medication 40 MILLIEQUIVALENT(S): at 10:10

## 2017-08-19 RX ADMIN — Medication 1: at 09:02

## 2017-08-19 RX ADMIN — Medication 50 GRAM(S): at 15:21

## 2017-08-19 RX ADMIN — Medication 325 MILLIGRAM(S): at 21:22

## 2017-08-19 RX ADMIN — Medication 1: at 11:54

## 2017-08-19 RX ADMIN — PANTOPRAZOLE SODIUM 40 MILLIGRAM(S): 20 TABLET, DELAYED RELEASE ORAL at 16:56

## 2017-08-19 RX ADMIN — Medication 325 MILLIGRAM(S): at 14:10

## 2017-08-19 RX ADMIN — Medication 325 MILLIGRAM(S): at 05:24

## 2017-08-19 NOTE — PROGRESS NOTE ADULT - ASSESSMENT
81 yr old male with alcohol abuse, hypertension, diabetes mellitus admitted with 3-4 episodes of vomiting dark blood. CT abdomen showed evidence of cirrhosis. Noted to have Hb 5.8, GI was consulted. He was given 2 units of PRBC. EGD was done by GI showed severe esophagitis with necrotic ulcerations. He was started on Ativan prn for withdrawal. His Hb improved to 7.5, 2 more units of PRBC. Social work consulted for alcohol withdrawal.

## 2017-08-19 NOTE — PROGRESS NOTE ADULT - SUBJECTIVE AND OBJECTIVE BOX
Patient is a 81y old  Male who presents with a chief complaint of weakness and dark stools (17 Aug 2017 01:44)      HPI:  Pt is a 81 yr old M with PMH of HTN , DM type 2 ,alcoholic liver cirrhosis - EGD here showed severe class D esophagitis with ulcerations, severe gastritis in proximal stomach. No varices.  Patient states he is tolerating solid diet. No nausea or vomiting, no abdominal pain. H+h has been stable    REVIEW OF SYSTEMS:  Constitutional: No fever, weight loss or fatigue  ENMT:  No difficulty hearing, tinnitus, vertigo; No sinus or throat pain  Respiratory: No cough, wheezing, chills or hemoptysis  Cardiovascular: No chest pain, palpitations, dizziness or leg swelling  Gastrointestinal: No abdominal or epigastric pain. No nausea, vomiting or hematemesis; No diarrhea or constipation. No melena or hematochezia.  Skin: No itching, burning, rashes or lesions   Musculoskeletal: No joint pain or swelling; No muscle, back or extremity pain    PAST MEDICAL & SURGICAL HISTORY:  HTN (hypertension)  Cirrhosis of liver  DM (diabetes mellitus)  S/P knee surgery      FAMILY HISTORY:  No pertinent family history in first degree relatives      SOCIAL HISTORY:  Smoking Status: [ ] Current, [ ] Former, [ ] Never  Pack Years:  [X  ] EtOH  [  ] IVDA    MEDICATIONS:  MEDICATIONS  (STANDING):  insulin lispro (HumaLOG) corrective regimen sliding scale   SubCutaneous three times a day before meals  dextrose 5%. 1000 milliLiter(s) (50 mL/Hr) IV Continuous <Continuous>  dextrose 50% Injectable 12.5 Gram(s) IV Push once  dextrose 50% Injectable 25 Gram(s) IV Push once  dextrose 50% Injectable 25 Gram(s) IV Push once  ferrous    sulfate 325 milliGRAM(s) Oral every 8 hours  dextrose 5% + sodium chloride 0.45%. 1000 milliLiter(s) (100 mL/Hr) IV Continuous <Continuous>  pantoprazole    Tablet 40 milliGRAM(s) Oral two times a day before meals    MEDICATIONS  (PRN):  dextrose Gel 1 Dose(s) Oral once PRN Blood Glucose LESS THAN 70 milliGRAM(s)/deciliter  glucagon  Injectable 1 milliGRAM(s) IntraMuscular once PRN Glucose LESS THAN 70 milligrams/deciliter  LORazepam   Injectable 1 milliGRAM(s) IV Push every 2 hours PRN CIWA-Ar score increase by 2 points and a total score of 7 or less  ondansetron Injectable 4 milliGRAM(s) IV Push every 6 hours PRN Nausea      Allergies    No Known Allergies    Intolerances        Vital Signs Last 24 Hrs  T(C): 36.8 (19 Aug 2017 05:21), Max: 36.9 (18 Aug 2017 15:33)  T(F): 98.3 (19 Aug 2017 05:21), Max: 98.5 (18 Aug 2017 15:33)  HR: 76 (19 Aug 2017 05:21) (74 - 80)  BP: 124/60 (19 Aug 2017 05:21) (114/60 - 132/60)  BP(mean): --  RR: 16 (19 Aug 2017 05:21) (16 - 18)  SpO2: 98% (19 Aug 2017 05:21) (97% - 98%)    08-18 @ 07:01  -  08-19 @ 07:00  --------------------------------------------------------  IN: 1440 mL / OUT: 400 mL / NET: 1040 mL    08-19 @ 07:01 - 08-19 @ 08:34  --------------------------------------------------------  IN: 100 mL / OUT: 420 mL / NET: -320 mL          PHYSICAL EXAM:    General: Well developed; well nourished; in no acute distress  HEENT: MMM, conjunctiva and sclera clear  Gastrointestinal: Soft, non-tender non-distended; Normal bowel sounds; No rebound or guarding  Extremities: Normal range of motion, No clubbing, cyanosis or edema  Neurological: Alert and oriented x3  Skin: Warm and dry. No obvious rash      LABS:                        9.0    5.2   )-----------( 67       ( 19 Aug 2017 07:52 )             25.9     19 Aug 2017 07:52    136    |  102    |  15.0   ----------------------------<  171    3.4     |  23.0   |  0.90     Ca    7.6        19 Aug 2017 07:52  Phos  2.5       19 Aug 2017 07:52  Mg     1.5       19 Aug 2017 07:52    TPro  5.6    /  Alb  2.9    /  TBili  1.2    /  DBili  x      /  AST  36     /  ALT  22     /  AlkPhos  50     / Amylase x      /Lipase x      19 Aug 2017 07:52              RADIOLOGY & ADDITIONAL STUDIES:     < from: Xray Chest 2 Views PA/Lat (08.16.17 @ 23:20) >  IMPRESSION:    No acute radiographic cardiopulmonary pathology.      < end of copied text >

## 2017-08-19 NOTE — PROGRESS NOTE ADULT - PROBLEM SELECTOR PLAN 5
-ISSS  - hemoglobin A1c pending  -monitor blood sugar
-ISSS  - hemoglobin A1c pending  -monitor blood sugar
Replace potassium

## 2017-08-19 NOTE — PROGRESS NOTE ADULT - SUBJECTIVE AND OBJECTIVE BOX
INTERVAL HPI/OVERNIGHT EVENTS:    CC: anemia secondary to upper GI bleed secondary to esophagitis, alcohol abuse, hypertension, diabetes mellitus    No overnight events, tolerating diet.    Vital Signs Last 24 Hrs  T(C): 36.4 (19 Aug 2017 11:00), Max: 36.8 (18 Aug 2017 21:40)  T(F): 97.5 (19 Aug 2017 11:00), Max: 98.3 (19 Aug 2017 05:21)  HR: 74 (19 Aug 2017 11:00) (74 - 80)  BP: 131/68 (19 Aug 2017 11:00) (124/60 - 132/60)  BP(mean): --  RR: 16 (19 Aug 2017 05:21) (16 - 17)  SpO2: 98% (19 Aug 2017 05:21) (97% - 98%)    PHYSICAL EXAM:    GENERAL: Not in distress, alert, + tremors  CHEST/LUNG: b/l air entry  HEART: Regular   ABDOMEN: Soft, BS+  EXTREMITIES:  no edema, tenderness    MEDICATIONS  (STANDING):  insulin lispro (HumaLOG) corrective regimen sliding scale   SubCutaneous three times a day before meals  dextrose 5%. 1000 milliLiter(s) (50 mL/Hr) IV Continuous <Continuous>  dextrose 50% Injectable 12.5 Gram(s) IV Push once  dextrose 50% Injectable 25 Gram(s) IV Push once  dextrose 50% Injectable 25 Gram(s) IV Push once  ferrous    sulfate 325 milliGRAM(s) Oral every 8 hours  pantoprazole    Tablet 40 milliGRAM(s) Oral two times a day before meals    MEDICATIONS  (PRN):  dextrose Gel 1 Dose(s) Oral once PRN Blood Glucose LESS THAN 70 milliGRAM(s)/deciliter  glucagon  Injectable 1 milliGRAM(s) IntraMuscular once PRN Glucose LESS THAN 70 milligrams/deciliter  LORazepam   Injectable 1 milliGRAM(s) IV Push every 2 hours PRN CIWA-Ar score increase by 2 points and a total score of 7 or less  ondansetron Injectable 4 milliGRAM(s) IV Push every 6 hours PRN Nausea      Allergies    No Known Allergies    Intolerances          LABS:                          9.0    5.2   )-----------( 67       ( 19 Aug 2017 07:52 )             25.9     08-19    136  |  102  |  15.0  ----------------------------<  171<H>  3.4<L>   |  23.0  |  0.90    Ca    7.6<L>      19 Aug 2017 07:52  Phos  2.5     08-19  Mg     1.5     08-19    TPro  5.6<L>  /  Alb  2.9<L>  /  TBili  1.2  /  DBili  x   /  AST  36  /  ALT  22  /  AlkPhos  50  08-19          RADIOLOGY & ADDITIONAL TESTS:

## 2017-08-19 NOTE — PROGRESS NOTE ADULT - PROBLEM SELECTOR PLAN 6
-INR = 1.36  -pt was not taken any anticoagulant, most likely due to end stage liver disease
-INR = 1.36  -pt was not taken any anticoagulant, most likely due to end stage liver disease
Replace magnesium

## 2017-08-20 ENCOUNTER — TRANSCRIPTION ENCOUNTER (OUTPATIENT)
Age: 82
End: 2017-08-20

## 2017-08-20 VITALS
RESPIRATION RATE: 18 BRPM | OXYGEN SATURATION: 94 % | HEART RATE: 86 BPM | TEMPERATURE: 98 F | SYSTOLIC BLOOD PRESSURE: 126 MMHG | DIASTOLIC BLOOD PRESSURE: 64 MMHG

## 2017-08-20 LAB
ANION GAP SERPL CALC-SCNC: 12 MMOL/L — SIGNIFICANT CHANGE UP (ref 5–17)
BUN SERPL-MCNC: 15 MG/DL — SIGNIFICANT CHANGE UP (ref 8–20)
CALCIUM SERPL-MCNC: 8.1 MG/DL — LOW (ref 8.6–10.2)
CHLORIDE SERPL-SCNC: 102 MMOL/L — SIGNIFICANT CHANGE UP (ref 98–107)
CO2 SERPL-SCNC: 20 MMOL/L — LOW (ref 22–29)
CREAT SERPL-MCNC: 0.96 MG/DL — SIGNIFICANT CHANGE UP (ref 0.5–1.3)
GLUCOSE SERPL-MCNC: 163 MG/DL — HIGH (ref 70–115)
HCT VFR BLD CALC: 28.3 % — LOW (ref 42–52)
HGB BLD-MCNC: 9.7 G/DL — LOW (ref 14–18)
MAGNESIUM SERPL-MCNC: 1.9 MG/DL — SIGNIFICANT CHANGE UP (ref 1.6–2.6)
MCHC RBC-ENTMCNC: 32 PG — HIGH (ref 27–31)
MCHC RBC-ENTMCNC: 34.3 G/DL — SIGNIFICANT CHANGE UP (ref 32–36)
MCV RBC AUTO: 93.4 FL — SIGNIFICANT CHANGE UP (ref 80–94)
PHOSPHATE SERPL-MCNC: 2.4 MG/DL — SIGNIFICANT CHANGE UP (ref 2.4–4.7)
PLATELET # BLD AUTO: 79 K/UL — LOW (ref 150–400)
POTASSIUM SERPL-MCNC: 4 MMOL/L — SIGNIFICANT CHANGE UP (ref 3.5–5.3)
POTASSIUM SERPL-SCNC: 4 MMOL/L — SIGNIFICANT CHANGE UP (ref 3.5–5.3)
RBC # BLD: 3.03 M/UL — LOW (ref 4.6–6.2)
RBC # FLD: 16.9 % — HIGH (ref 11–15.6)
SODIUM SERPL-SCNC: 134 MMOL/L — LOW (ref 135–145)
WBC # BLD: 6.4 K/UL — SIGNIFICANT CHANGE UP (ref 4.8–10.8)
WBC # FLD AUTO: 6.4 K/UL — SIGNIFICANT CHANGE UP (ref 4.8–10.8)

## 2017-08-20 PROCEDURE — 99232 SBSQ HOSP IP/OBS MODERATE 35: CPT

## 2017-08-20 PROCEDURE — 99239 HOSP IP/OBS DSCHRG MGMT >30: CPT

## 2017-08-20 RX ORDER — FOLIC ACID 0.8 MG
1 TABLET ORAL
Qty: 30 | Refills: 0 | OUTPATIENT
Start: 2017-08-20 | End: 2017-09-19

## 2017-08-20 RX ORDER — FERROUS SULFATE 325(65) MG
1 TABLET ORAL
Qty: 90 | Refills: 0 | OUTPATIENT
Start: 2017-08-20 | End: 2017-09-19

## 2017-08-20 RX ORDER — PANTOPRAZOLE SODIUM 20 MG/1
1 TABLET, DELAYED RELEASE ORAL
Qty: 60 | Refills: 0 | OUTPATIENT
Start: 2017-08-20 | End: 2017-09-19

## 2017-08-20 RX ADMIN — PANTOPRAZOLE SODIUM 40 MILLIGRAM(S): 20 TABLET, DELAYED RELEASE ORAL at 17:25

## 2017-08-20 RX ADMIN — PANTOPRAZOLE SODIUM 40 MILLIGRAM(S): 20 TABLET, DELAYED RELEASE ORAL at 05:32

## 2017-08-20 RX ADMIN — Medication 325 MILLIGRAM(S): at 05:31

## 2017-08-20 RX ADMIN — Medication 1: at 12:28

## 2017-08-20 RX ADMIN — Medication 325 MILLIGRAM(S): at 13:59

## 2017-08-20 NOTE — DISCHARGE NOTE ADULT - CARE PLAN
Principal Discharge DX:	[place] grade D esophagitis  Goal:	Avoid recurrent bleeding.  Instructions for follow-up, activity and diet:	Follow up with GI, continue PPI.  Secondary Diagnosis:	HTN (hypertension)  Instructions for follow-up, activity and diet:	Continue lisinpril  Secondary Diagnosis:	Alcohol abuse  Instructions for follow-up, activity and diet:	Abstain from alcohol. Principal Discharge DX:	[place] grade D esophagitis  Goal:	Avoid recurrent bleeding.  Instructions for follow-up, activity and diet:	Follow up with GI, continue PPI.  Secondary Diagnosis:	HTN (hypertension)  Instructions for follow-up, activity and diet:	Continue lisinpril  Secondary Diagnosis:	Alcohol abuse  Instructions for follow-up, activity and diet:	Abstain from alcohol.

## 2017-08-20 NOTE — PROGRESS NOTE ADULT - ASSESSMENT
81 yr old male with alcohol abuse, hypertension, diabetes mellitus admitted with 3-4 episodes of vomiting dark blood. CT abdomen showed evidence of cirrhosis. Noted to have Hb 5.8, GI was consulted. He was given 2 units of PRBC. EGD was done by GI showed severe esophagitis with necrotic ulcerations. He was started on Ativan prn for withdrawal. His Hb improved to 7.5, 2 more units of PRBC. Social work consulted for alcohol withdrawal. He declined inpatient rehab, stated he would quit alcohol on his own. His Hb remained stable. PT evaluation was done, advised home with no needs.

## 2017-08-20 NOTE — PROGRESS NOTE ADULT - PROBLEM SELECTOR PROBLEM 1
Upper GI bleed
[place] grade D esophagitis
[place] grade D esophagitis
Upper GI bleed
Alcoholic gastritis with hemorrhage, unspecified chronicity
[place] grade D esophagitis
[place] grade D esophagitis
[place] grade D esophagitis

## 2017-08-20 NOTE — PROGRESS NOTE ADULT - PROBLEM SELECTOR PLAN 1
- source is most likely variceal bleed due to portal hypertension   - diet NPo for now, then consistent carbohydrate after evaluation by GI  - blood transfusion of 2 unit of PRBC  - trend H/H, repeat H/H in 6 hours as patient has no active hematemesis or melena  - patient is on pantoprazole drip, continue for now  -GI consult with Dr. Mcknight
- source is most likely variceal bleed due to portal hypertension   - vitals sings per routine  - diet NPO for EGD today  - ambulate with assistance   - fall precautions   - blood transfusion of 2 unit of PRBC  - trend H/H, repeat H/H after transfusion complete  - patient is on pantoprazole drip, Ocreotide drip  -GI consult with Dr. Mcknight
Continue PPI, tolerating diet.
Continue PPI, tolerating diet. GI f/u noted.
OK to advance diet to solids. IV Pantoprazole 40 mg. every 12 hours. Repeat labs ordered for tomorrow AM. Likely secondary to ETOH. ETOH abstinence strongly advise to the pt.
Patient with hx of ETOH  NO varices on EGD, but severe esophagitis and gastritis . Will change IV protonix to po bid  I advised AA, ETOH abstanence - Pt does not want AA, says he will quit on his own  may D/C Home today from GI standpoint.
Patient with severe esophagitis, gastritis. On po protonix bid  Pt does not want AA, says he will stop EOTH on his own. I reiterated the need to stop ETOH.   Will sign off
Continue Pantoprazole, monitor Hb. Advance diet as per GI.

## 2017-08-20 NOTE — PROGRESS NOTE ADULT - PROBLEM SELECTOR PLAN 4
- pt is not in alcohol withdraw at this time, his CIWA score is: 3   - CIWA protocol with ativan
- CIWA protocol with ativan  -SW consult
Patient declines inpatient alcohol rehab, wants to follow up with AA. PT evaluation noted.
Patient declines inpatient alcohol rehab, wants to follow up with AA. PT evaluation.
Continue Ativan prn, social work evaluation.

## 2017-08-20 NOTE — PROGRESS NOTE ADULT - PROBLEM SELECTOR PROBLEM 3
Cirrhosis of liver without ascites
Cirrhosis of liver without ascites
HTN (hypertension)

## 2017-08-20 NOTE — PROGRESS NOTE ADULT - PROBLEM SELECTOR PROBLEM 2
Normocytic normochromic anemia
Alcoholic gastritis with hemorrhage, unspecified chronicity
Alcoholic gastritis with hemorrhage, unspecified chronicity
[place] grade D esophagitis
Normocytic normochromic anemia

## 2017-08-20 NOTE — PROGRESS NOTE ADULT - SUBJECTIVE AND OBJECTIVE BOX
INTERVAL HPI/OVERNIGHT EVENTS:    CC: alcohol abuse, anemia secondary to severe esophagitis    No overnight events, denies complaints.    Vital Signs Last 24 Hrs  T(C): 37 (20 Aug 2017 11:53), Max: 37 (20 Aug 2017 11:53)  T(F): 98.6 (20 Aug 2017 11:53), Max: 98.6 (20 Aug 2017 11:53)  HR: 77 (20 Aug 2017 11:53) (68 - 77)  BP: 112/60 (20 Aug 2017 11:53) (112/60 - 135/64)  BP(mean): --  RR: 18 (20 Aug 2017 11:53) (16 - 18)  SpO2: 97% (20 Aug 2017 11:53) (97% - 98%)    PHYSICAL EXAM:    GENERAL: Not in distress, alert  CHEST/LUNG:b/l air entry, clear  HEART: Regular  ABDOMEN: Soft, BS+  EXTREMITIES:  No edema, tenderness    MEDICATIONS  (STANDING):  insulin lispro (HumaLOG) corrective regimen sliding scale   SubCutaneous three times a day before meals  dextrose 5%. 1000 milliLiter(s) (50 mL/Hr) IV Continuous <Continuous>  dextrose 50% Injectable 12.5 Gram(s) IV Push once  dextrose 50% Injectable 25 Gram(s) IV Push once  dextrose 50% Injectable 25 Gram(s) IV Push once  ferrous    sulfate 325 milliGRAM(s) Oral every 8 hours  pantoprazole    Tablet 40 milliGRAM(s) Oral two times a day before meals    MEDICATIONS  (PRN):  dextrose Gel 1 Dose(s) Oral once PRN Blood Glucose LESS THAN 70 milliGRAM(s)/deciliter  glucagon  Injectable 1 milliGRAM(s) IntraMuscular once PRN Glucose LESS THAN 70 milligrams/deciliter  LORazepam   Injectable 1 milliGRAM(s) IV Push every 2 hours PRN CIWA-Ar score increase by 2 points and a total score of 7 or less  ondansetron Injectable 4 milliGRAM(s) IV Push every 6 hours PRN Nausea      Allergies    No Known Allergies    Intolerances          LABS:                          9.7    6.4   )-----------( 79       ( 20 Aug 2017 08:06 )             28.3     08-20    134<L>  |  102  |  15.0  ----------------------------<  163<H>  4.0   |  20.0<L>  |  0.96    Ca    8.1<L>      20 Aug 2017 08:06  Phos  2.4     08-20  Mg     1.9     08-20    TPro  5.6<L>  /  Alb  2.9<L>  /  TBili  1.2  /  DBili  x   /  AST  36  /  ALT  22  /  AlkPhos  50  08-19          RADIOLOGY & ADDITIONAL TESTS:

## 2017-08-20 NOTE — DISCHARGE NOTE ADULT - ADDITIONAL INSTRUCTIONS
Continue medications as instructed, follow up with PMD in 1 week. Abstain from alcohol. Follow up with GI in 2 weeks.

## 2017-08-20 NOTE — PROGRESS NOTE ADULT - ATTENDING COMMENTS
Patient seen and examined at bedside with PA. No overnight events, no new episodes of vomiting. On exam, mild tremors, + epigastric tenderness. Labs noted. Will transfuse 2 units PRBC, monitor Hb. GI evaluation noted, for EGD today. Ativan prn for agitation/withdrawal. SW evaluation for rehab.
Plan of care discussed with patient and RN.
Plan of care discussed with patient and RN. PT evaluation, anticipate discharge in 24-48 hrs.
Plan of care discussed with patient.

## 2017-08-20 NOTE — PROGRESS NOTE ADULT - SUBJECTIVE AND OBJECTIVE BOX
Patient is a 81y old  Male who presents with a chief complaint of weakness and dark stools (17 Aug 2017 01:44)      HPI:  Pt is a 81 yr old M with PMH of HTN , DM type 2 ,alcoholic liver disease. Awake, alter and oriented X 3. Tolerating solid diet. H+H stable. EGD showed severe esophagitis. Severe gastritis, No varices. NO biopsies taken.     REVIEW OF SYSTEMS:  Constitutional: No fever, weight loss or fatigue  ENMT:  No difficulty hearing, tinnitus, vertigo; No sinus or throat pain  Respiratory: No cough, wheezing, chills or hemoptysis  Cardiovascular: No chest pain, palpitations, dizziness or leg swelling  Gastrointestinal: No abdominal or epigastric pain. No nausea, vomiting or hematemesis; No diarrhea or constipation. No melena or hematochezia.  Skin: No itching, burning, rashes or lesions   Musculoskeletal: No joint pain or swelling; No muscle, back or extremity pain    PAST MEDICAL & SURGICAL HISTORY:  HTN (hypertension)  Cirrhosis of liver  DM (diabetes mellitus)  S/P knee surgery      FAMILY HISTORY:  No pertinent family history in first degree relatives      SOCIAL HISTORY:  Smoking Status: [ ] Current, [ ] Former, [ ] Never  Pack Years:  [X  ] EtOH  [  ] IVDA    MEDICATIONS:  MEDICATIONS  (STANDING):  insulin lispro (HumaLOG) corrective regimen sliding scale   SubCutaneous three times a day before meals  dextrose 5%. 1000 milliLiter(s) (50 mL/Hr) IV Continuous <Continuous>  dextrose 50% Injectable 12.5 Gram(s) IV Push once  dextrose 50% Injectable 25 Gram(s) IV Push once  dextrose 50% Injectable 25 Gram(s) IV Push once  ferrous    sulfate 325 milliGRAM(s) Oral every 8 hours  pantoprazole    Tablet 40 milliGRAM(s) Oral two times a day before meals    MEDICATIONS  (PRN):  dextrose Gel 1 Dose(s) Oral once PRN Blood Glucose LESS THAN 70 milliGRAM(s)/deciliter  glucagon  Injectable 1 milliGRAM(s) IntraMuscular once PRN Glucose LESS THAN 70 milligrams/deciliter  LORazepam   Injectable 1 milliGRAM(s) IV Push every 2 hours PRN CIWA-Ar score increase by 2 points and a total score of 7 or less  ondansetron Injectable 4 milliGRAM(s) IV Push every 6 hours PRN Nausea      Allergies    No Known Allergies    Intolerances        Vital Signs Last 24 Hrs  T(C): 36.9 (19 Aug 2017 21:18), Max: 36.9 (19 Aug 2017 21:18)  T(F): 98.5 (19 Aug 2017 21:18), Max: 98.5 (19 Aug 2017 21:18)  HR: 68 (19 Aug 2017 16:42) (68 - 74)  BP: 130/58 (19 Aug 2017 21:18) (130/58 - 135/64)  BP(mean): --  RR: 16 (19 Aug 2017 21:18) (16 - 16)  SpO2: 98% (19 Aug 2017 21:18) (98% - 98%)    08-19 @ 07:01  -  08-20 @ 07:00  --------------------------------------------------------  IN: 1660 mL / OUT: 620 mL / NET: 1040 mL          PHYSICAL EXAM:    General: Well developed; well nourished; in no acute distress  HEENT: MMM, conjunctiva and sclera clear  Gastrointestinal: Soft, non-tender non-distended; Normal bowel sounds; No rebound or guarding  Extremities: Normal range of motion, No clubbing, cyanosis or edema  Neurological: Alert and oriented x3  Skin: Warm and dry. No obvious rash      LABS:                        9.0    5.2   )-----------( 67       ( 19 Aug 2017 07:52 )             25.9     19 Aug 2017 07:52    136    |  102    |  15.0   ----------------------------<  171    3.4     |  23.0   |  0.90     Ca    7.6        19 Aug 2017 07:52  Phos  2.5       19 Aug 2017 07:52  Mg     1.5       19 Aug 2017 07:52    TPro  5.6    /  Alb  2.9    /  TBili  1.2    /  DBili  x      /  AST  36     /  ALT  22     /  AlkPhos  50     / Amylase x      /Lipase x      19 Aug 2017 07:52              RADIOLOGY & ADDITIONAL STUDIES:

## 2017-08-20 NOTE — DISCHARGE NOTE ADULT - MEDICATION SUMMARY - MEDICATIONS TO TAKE
I will START or STAY ON the medications listed below when I get home from the hospital:    acetaminophen 325 mg oral tablet  -- 2 tab(s) by mouth every 6 hours, As needed, Mild Pain (1 - 3)  -- Indication: For Pain    lisinopril 40 mg oral tablet  -- 1 tab(s) by mouth once a day  -- Indication: For HTN (hypertension)    tamsulosin 0.4 mg oral capsule  -- 1 cap(s) by mouth once a day (at bedtime)  -- Indication: For bph    insulin lispro 100 units/mL subcutaneous solution  --  subcutaneous 3 times a day (before meals); 2 Unit(s) if Glucose 151 - 200  4 Unit(s) if Glucose 201 - 250  6 Unit(s) if Glucose 251 - 300  8 Unit(s) if Glucose 301 - 350  10 Unit(s) if Glucose 351 - 400  12 Unit(s) if Glucose Greater Than 400  -- Indication: For Diabetes    FeroSul 325 mg (65 mg elemental iron) oral tablet  -- 1 tab(s) by mouth 3 times a day  -- Indication: For Anemia    pantoprazole 40 mg oral delayed release tablet  -- 1 tab(s) by mouth 2 times a day (before meals)  -- Indication: For Alcoholic gastritis with hemorrhage, unspecified chronicity    Multiple Vitamins oral tablet  -- 1 tab(s) by mouth once a day  -- Indication: For Supplement    folic acid 1 mg oral tablet  -- 1 tab(s) by mouth once a day  -- Indication: For Supplement.

## 2017-08-20 NOTE — DISCHARGE NOTE ADULT - CARE PROVIDER_API CALL
Diamond Nicholson (DO), Gastroenterology; Internal Medicine  [address]  [address]  [place] [address]  Phone: [phone]  Fax: [phone]    Lawrence Doe), Internal Medicine  [address]  [place] [address]  Phone: [phone]  Fax: [phone]

## 2017-08-20 NOTE — DISCHARGE NOTE ADULT - PATIENT PORTAL LINK FT
“You can access the [facility] Patient Portal, offered by [facility], by registering with the following website: [email or web address]”

## 2017-08-20 NOTE — PROGRESS NOTE ADULT - PROBLEM SELECTOR PLAN 2
- patient has symptomatic anemia   secondary to GI bleeding  -blood transfusion of 2 units of PRBC in the ED  -iron 325 mg tid  -follow up Hb
- patient has symptomatic anemia   secondary to GI bleeding  -blood transfusion of 2 units of PRBC  -iron 325 mg tid  -follow up Hb
Hb improved.
Hb stable
See above recommendations and management plan. ETOH abstinence strongly advised.
Transfuse 2 units of PRBC.

## 2017-08-20 NOTE — DISCHARGE NOTE ADULT - HOSPITAL COURSE
81 yr old male with alcohol abuse, hypertension, diabetes mellitus admitted with 3-4 episodes of vomiting dark blood. CT abdomen showed evidence of cirrhosis. Noted to have Hb 5.8, GI was consulted. He was given 2 units of PRBC. EGD was done by GI showed severe esophagitis with necrotic ulcerations. He was started on Ativan prn for withdrawal. His Hb improved to 7.5, 2 more units of PRBC. Social work consulted for alcohol withdrawal. He declined inpatient rehab, stated he would quit alcohol on his own. His Hb remained stable. PT evaluation was done, advised home with no needs. He improved clinically and is stable for discharge home.    Spent > 35 mins in discharge plan and documentation.

## 2017-08-22 ENCOUNTER — RX RENEWAL (OUTPATIENT)
Age: 82
End: 2017-08-22

## 2017-08-22 PROBLEM — K74.60 UNSPECIFIED CIRRHOSIS OF LIVER: Chronic | Status: ACTIVE | Noted: 2017-08-16

## 2017-08-31 ENCOUNTER — APPOINTMENT (OUTPATIENT)
Dept: INTERNAL MEDICINE | Facility: CLINIC | Age: 82
End: 2017-08-31
Payer: MEDICARE

## 2017-08-31 VITALS
SYSTOLIC BLOOD PRESSURE: 124 MMHG | HEART RATE: 83 BPM | DIASTOLIC BLOOD PRESSURE: 68 MMHG | OXYGEN SATURATION: 99 % | HEIGHT: 69 IN | RESPIRATION RATE: 16 BRPM | WEIGHT: 180 LBS | BODY MASS INDEX: 26.66 KG/M2

## 2017-08-31 DIAGNOSIS — N40.1 BENIGN PROSTATIC HYPERPLASIA WITH LOWER URINARY TRACT SYMPMS: ICD-10-CM

## 2017-08-31 DIAGNOSIS — N13.8 BENIGN PROSTATIC HYPERPLASIA WITH LOWER URINARY TRACT SYMPMS: ICD-10-CM

## 2017-08-31 PROCEDURE — 36415 COLL VENOUS BLD VENIPUNCTURE: CPT

## 2017-08-31 PROCEDURE — 99495 TRANSJ CARE MGMT MOD F2F 14D: CPT | Mod: 25

## 2017-08-31 RX ORDER — OXYCODONE HYDROCHLORIDE 30 MG/1
TABLET ORAL
Refills: 0 | Status: DISCONTINUED | COMMUNITY

## 2017-08-31 RX ORDER — METFORMIN HYDROCHLORIDE 625 MG/1
TABLET ORAL
Refills: 0 | Status: DISCONTINUED | COMMUNITY

## 2017-09-01 LAB
ALBUMIN SERPL ELPH-MCNC: 3.3 G/DL
ALP BLD-CCNC: 117 U/L
ALT SERPL-CCNC: 32 U/L
ANION GAP SERPL CALC-SCNC: 14 MMOL/L
AST SERPL-CCNC: 47 U/L
BASOPHILS # BLD AUTO: 0.01 K/UL
BASOPHILS NFR BLD AUTO: 0.2 %
BILIRUB SERPL-MCNC: 0.7 MG/DL
BUN SERPL-MCNC: 12 MG/DL
CALCIUM SERPL-MCNC: 9 MG/DL
CHLORIDE SERPL-SCNC: 106 MMOL/L
CO2 SERPL-SCNC: 20 MMOL/L
CREAT SERPL-MCNC: 1.13 MG/DL
EOSINOPHIL # BLD AUTO: 0.15 K/UL
EOSINOPHIL NFR BLD AUTO: 3.3 %
GLUCOSE SERPL-MCNC: 193 MG/DL
HBA1C MFR BLD HPLC: 5.5 %
HCT VFR BLD CALC: 30.6 %
HGB BLD-MCNC: 9.3 G/DL
IMM GRANULOCYTES NFR BLD AUTO: 0 %
LYMPHOCYTES # BLD AUTO: 1.05 K/UL
LYMPHOCYTES NFR BLD AUTO: 23.1 %
MAGNESIUM SERPL-MCNC: 1.7 MG/DL
MAN DIFF?: NORMAL
MCHC RBC-ENTMCNC: 30.3 PG
MCHC RBC-ENTMCNC: 30.4 GM/DL
MCV RBC AUTO: 99.7 FL
MONOCYTES # BLD AUTO: 0.43 K/UL
MONOCYTES NFR BLD AUTO: 9.5 %
NEUTROPHILS # BLD AUTO: 2.91 K/UL
NEUTROPHILS NFR BLD AUTO: 63.9 %
PLATELET # BLD AUTO: 157 K/UL
POTASSIUM SERPL-SCNC: 4.4 MMOL/L
PROT SERPL-MCNC: 6.7 G/DL
RBC # BLD: 3.07 M/UL
RBC # FLD: 15.8 %
SODIUM SERPL-SCNC: 140 MMOL/L
WBC # FLD AUTO: 4.55 K/UL

## 2017-09-27 ENCOUNTER — APPOINTMENT (OUTPATIENT)
Dept: INTERNAL MEDICINE | Facility: CLINIC | Age: 82
End: 2017-09-27

## 2017-10-19 PROCEDURE — 86920 COMPATIBILITY TEST SPIN: CPT

## 2017-10-19 PROCEDURE — 83036 HEMOGLOBIN GLYCOSYLATED A1C: CPT

## 2017-10-19 PROCEDURE — 80048 BASIC METABOLIC PNL TOTAL CA: CPT

## 2017-10-19 PROCEDURE — 82550 ASSAY OF CK (CPK): CPT

## 2017-10-19 PROCEDURE — 71046 X-RAY EXAM CHEST 2 VIEWS: CPT

## 2017-10-19 PROCEDURE — 83735 ASSAY OF MAGNESIUM: CPT

## 2017-10-19 PROCEDURE — 85610 PROTHROMBIN TIME: CPT

## 2017-10-19 PROCEDURE — 85027 COMPLETE CBC AUTOMATED: CPT

## 2017-10-19 PROCEDURE — 83690 ASSAY OF LIPASE: CPT

## 2017-10-19 PROCEDURE — 82272 OCCULT BLD FECES 1-3 TESTS: CPT

## 2017-10-19 PROCEDURE — 84100 ASSAY OF PHOSPHORUS: CPT

## 2017-10-19 PROCEDURE — 86900 BLOOD TYPING SEROLOGIC ABO: CPT

## 2017-10-19 PROCEDURE — 84484 ASSAY OF TROPONIN QUANT: CPT

## 2017-10-19 PROCEDURE — 96375 TX/PRO/DX INJ NEW DRUG ADDON: CPT

## 2017-10-19 PROCEDURE — P9040: CPT

## 2017-10-19 PROCEDURE — 96374 THER/PROPH/DIAG INJ IV PUSH: CPT

## 2017-10-19 PROCEDURE — 36415 COLL VENOUS BLD VENIPUNCTURE: CPT

## 2017-10-19 PROCEDURE — 86901 BLOOD TYPING SEROLOGIC RH(D): CPT

## 2017-10-19 PROCEDURE — 99285 EMERGENCY DEPT VISIT HI MDM: CPT | Mod: 25

## 2017-10-19 PROCEDURE — 80053 COMPREHEN METABOLIC PANEL: CPT

## 2017-10-19 PROCEDURE — 36430 TRANSFUSION BLD/BLD COMPNT: CPT

## 2017-10-19 PROCEDURE — 93005 ELECTROCARDIOGRAM TRACING: CPT

## 2017-10-19 PROCEDURE — P9016: CPT

## 2017-10-19 PROCEDURE — 97163 PT EVAL HIGH COMPLEX 45 MIN: CPT

## 2017-10-19 PROCEDURE — 82140 ASSAY OF AMMONIA: CPT

## 2017-10-19 PROCEDURE — 86850 RBC ANTIBODY SCREEN: CPT

## 2017-10-19 PROCEDURE — 80076 HEPATIC FUNCTION PANEL: CPT

## 2018-01-24 ENCOUNTER — RX RENEWAL (OUTPATIENT)
Age: 83
End: 2018-01-24

## 2018-01-24 RX ORDER — INSULIN GLARGINE 100 [IU]/ML
100 INJECTION, SOLUTION SUBCUTANEOUS DAILY
Qty: 2 | Refills: 0 | Status: DISCONTINUED | COMMUNITY
End: 2018-01-24

## 2018-02-07 ENCOUNTER — APPOINTMENT (OUTPATIENT)
Dept: INTERNAL MEDICINE | Facility: CLINIC | Age: 83
End: 2018-02-07
Payer: MEDICARE

## 2018-02-07 ENCOUNTER — LABORATORY RESULT (OUTPATIENT)
Age: 83
End: 2018-02-07

## 2018-02-07 VITALS
WEIGHT: 190 LBS | HEIGHT: 69 IN | HEART RATE: 72 BPM | BODY MASS INDEX: 28.14 KG/M2 | SYSTOLIC BLOOD PRESSURE: 130 MMHG | DIASTOLIC BLOOD PRESSURE: 80 MMHG | OXYGEN SATURATION: 100 %

## 2018-02-07 DIAGNOSIS — D64.9 ANEMIA, UNSPECIFIED: ICD-10-CM

## 2018-02-07 PROCEDURE — 99214 OFFICE O/P EST MOD 30 MIN: CPT | Mod: 25

## 2018-02-07 PROCEDURE — 36415 COLL VENOUS BLD VENIPUNCTURE: CPT

## 2018-02-08 LAB
ALBUMIN SERPL ELPH-MCNC: 3.7 G/DL
ALP BLD-CCNC: 147 U/L
ALT SERPL-CCNC: 23 U/L
ANION GAP SERPL CALC-SCNC: 15 MMOL/L
AST SERPL-CCNC: 41 U/L
BASOPHILS # BLD AUTO: 0.01 K/UL
BASOPHILS NFR BLD AUTO: 0.2 %
BILIRUB SERPL-MCNC: 0.9 MG/DL
BUN SERPL-MCNC: 14 MG/DL
CALCIUM SERPL-MCNC: 9.4 MG/DL
CHLORIDE SERPL-SCNC: 107 MMOL/L
CHOLEST SERPL-MCNC: 166 MG/DL
CHOLEST/HDLC SERPL: 2.3 RATIO
CO2 SERPL-SCNC: 19 MMOL/L
CREAT SERPL-MCNC: 1.11 MG/DL
EOSINOPHIL # BLD AUTO: 0.31 K/UL
EOSINOPHIL NFR BLD AUTO: 5.9 %
GLUCOSE SERPL-MCNC: 128 MG/DL
HBA1C MFR BLD HPLC: 6.8 %
HCT VFR BLD CALC: 33.9 %
HDLC SERPL-MCNC: 71 MG/DL
HGB BLD-MCNC: 10.9 G/DL
IMM GRANULOCYTES NFR BLD AUTO: 0.2 %
LDLC SERPL CALC-MCNC: 78 MG/DL
LYMPHOCYTES # BLD AUTO: 1.16 K/UL
LYMPHOCYTES NFR BLD AUTO: 22.2 %
MAN DIFF?: NORMAL
MCHC RBC-ENTMCNC: 30.1 PG
MCHC RBC-ENTMCNC: 32.2 GM/DL
MCV RBC AUTO: 93.6 FL
MONOCYTES # BLD AUTO: 0.71 K/UL
MONOCYTES NFR BLD AUTO: 13.6 %
NEUTROPHILS # BLD AUTO: 3.03 K/UL
NEUTROPHILS NFR BLD AUTO: 57.9 %
PLATELET # BLD AUTO: 96 K/UL
POTASSIUM SERPL-SCNC: 4.1 MMOL/L
PROT SERPL-MCNC: 7.9 G/DL
RBC # BLD: 3.62 M/UL
RBC # FLD: 15.9 %
SODIUM SERPL-SCNC: 141 MMOL/L
TRIGL SERPL-MCNC: 83 MG/DL
WBC # FLD AUTO: 5.23 K/UL

## 2018-02-12 ENCOUNTER — RESULT REVIEW (OUTPATIENT)
Age: 83
End: 2018-02-12

## 2018-06-08 ENCOUNTER — APPOINTMENT (OUTPATIENT)
Dept: INTERNAL MEDICINE | Facility: CLINIC | Age: 83
End: 2018-06-08
Payer: MEDICARE

## 2018-06-08 VITALS
SYSTOLIC BLOOD PRESSURE: 150 MMHG | OXYGEN SATURATION: 95 % | WEIGHT: 186 LBS | TEMPERATURE: 98.1 F | DIASTOLIC BLOOD PRESSURE: 80 MMHG | HEIGHT: 69 IN | BODY MASS INDEX: 27.55 KG/M2 | HEART RATE: 92 BPM

## 2018-06-08 PROCEDURE — 99214 OFFICE O/P EST MOD 30 MIN: CPT | Mod: 25

## 2018-06-08 PROCEDURE — 36415 COLL VENOUS BLD VENIPUNCTURE: CPT

## 2018-06-11 LAB
ALBUMIN SERPL ELPH-MCNC: 3.5 G/DL
ALP BLD-CCNC: 106 U/L
ALT SERPL-CCNC: 29 U/L
ANION GAP SERPL CALC-SCNC: 16 MMOL/L
AST SERPL-CCNC: 70 U/L
BASOPHILS # BLD AUTO: 0.02 K/UL
BASOPHILS NFR BLD AUTO: 0.3 %
BILIRUB SERPL-MCNC: 1.4 MG/DL
BUN SERPL-MCNC: 12 MG/DL
CALCIUM SERPL-MCNC: 8.9 MG/DL
CHLORIDE SERPL-SCNC: 101 MMOL/L
CO2 SERPL-SCNC: 22 MMOL/L
CREAT SERPL-MCNC: 1.02 MG/DL
EOSINOPHIL # BLD AUTO: 0.24 K/UL
EOSINOPHIL NFR BLD AUTO: 3.8 %
GLUCOSE SERPL-MCNC: 120 MG/DL
HBA1C MFR BLD HPLC: 5.3 %
HCT VFR BLD CALC: 41.3 %
HGB BLD-MCNC: 13.3 G/DL
IMM GRANULOCYTES NFR BLD AUTO: 0.2 %
LYMPHOCYTES # BLD AUTO: 1.64 K/UL
LYMPHOCYTES NFR BLD AUTO: 26.1 %
MAN DIFF?: NORMAL
MCHC RBC-ENTMCNC: 30.9 PG
MCHC RBC-ENTMCNC: 32.2 GM/DL
MCV RBC AUTO: 96 FL
MONOCYTES # BLD AUTO: 0.89 K/UL
MONOCYTES NFR BLD AUTO: 14.1 %
NEUTROPHILS # BLD AUTO: 3.49 K/UL
NEUTROPHILS NFR BLD AUTO: 55.5 %
PLATELET # BLD AUTO: 152 K/UL
POTASSIUM SERPL-SCNC: 3.8 MMOL/L
PROT SERPL-MCNC: 7.7 G/DL
RBC # BLD: 4.3 M/UL
RBC # FLD: 16.8 %
SODIUM SERPL-SCNC: 139 MMOL/L
TSH SERPL-ACNC: 1.59 UIU/ML
WBC # FLD AUTO: 6.29 K/UL

## 2018-06-14 ENCOUNTER — APPOINTMENT (OUTPATIENT)
Dept: INTERNAL MEDICINE | Facility: CLINIC | Age: 83
End: 2018-06-14
Payer: MEDICARE

## 2018-06-14 VITALS
HEIGHT: 69 IN | WEIGHT: 182 LBS | SYSTOLIC BLOOD PRESSURE: 140 MMHG | BODY MASS INDEX: 26.96 KG/M2 | HEART RATE: 102 BPM | DIASTOLIC BLOOD PRESSURE: 80 MMHG | RESPIRATION RATE: 16 BRPM | OXYGEN SATURATION: 94 %

## 2018-06-14 DIAGNOSIS — M00.9 PYOGENIC ARTHRITIS, UNSPECIFIED: ICD-10-CM

## 2018-06-14 DIAGNOSIS — E11.65 TYPE 2 DIABETES MELLITUS WITH HYPERGLYCEMIA: ICD-10-CM

## 2018-06-14 DIAGNOSIS — K70.30 ALCOHOLIC CIRRHOSIS OF LIVER W/OUT ASCITES: ICD-10-CM

## 2018-06-14 DIAGNOSIS — K22.11 ULCER OF ESOPHAGUS WITH BLEEDING: ICD-10-CM

## 2018-06-14 PROCEDURE — 99214 OFFICE O/P EST MOD 30 MIN: CPT

## 2018-06-14 RX ORDER — TAMSULOSIN HYDROCHLORIDE 0.4 MG/1
0.4 CAPSULE ORAL
Qty: 90 | Refills: 1 | Status: DISCONTINUED | COMMUNITY
Start: 2017-08-31 | End: 2018-06-14

## 2018-06-14 RX ORDER — ELECTROLYTES/DEXTROSE
SOLUTION, ORAL ORAL DAILY
Refills: 0 | Status: DISCONTINUED | COMMUNITY
Start: 2017-08-31 | End: 2018-06-14

## 2018-06-14 RX ORDER — FOLIC ACID 1 MG/1
1 TABLET ORAL DAILY
Qty: 90 | Refills: 1 | Status: ACTIVE | COMMUNITY
Start: 2017-08-31 | End: 1900-01-01

## 2018-06-14 RX ORDER — AZITHROMYCIN DIHYDRATE 250 MG/1
250 TABLET, FILM COATED ORAL
Qty: 1 | Refills: 0 | Status: DISCONTINUED | COMMUNITY
Start: 2018-06-08 | End: 2018-06-14

## 2018-06-14 RX ORDER — RAMIPRIL 5 MG/1
5 CAPSULE ORAL
Qty: 90 | Refills: 1 | Status: ACTIVE | COMMUNITY
Start: 2017-08-22 | End: 1900-01-01

## 2018-06-14 RX ORDER — PANTOPRAZOLE 40 MG/1
40 TABLET, DELAYED RELEASE ORAL TWICE DAILY
Qty: 180 | Refills: 1 | Status: ACTIVE | COMMUNITY
Start: 2017-08-31 | End: 1900-01-01

## 2018-06-14 RX ORDER — INSULIN GLARGINE 100 [IU]/ML
100 INJECTION, SOLUTION SUBCUTANEOUS
Qty: 1 | Refills: 0 | Status: DISCONTINUED | COMMUNITY
Start: 2018-01-24 | End: 2018-06-14

## 2018-06-14 RX ORDER — METFORMIN HYDROCHLORIDE 1000 MG/1
1000 TABLET, COATED ORAL
Qty: 180 | Refills: 1 | Status: DISCONTINUED | COMMUNITY
Start: 2018-02-07 | End: 2018-06-14

## 2018-06-14 NOTE — PHYSICAL EXAM
[No Acute Distress] : no acute distress [Normal Sclera/Conjunctiva] : normal sclera/conjunctiva [No Respiratory Distress] : no respiratory distress  [Normal Rate] : normal rate  [Regular Rhythm] : with a regular rhythm [Soft] : abdomen soft [Non Tender] : non-tender [No Masses] : no abdominal mass palpated [No HSM] : no HSM [Normal Gait] : normal gait [Normal Affect] : the affect was normal [de-identified] : Chronically ill-appearing [de-identified] : anicteric [de-identified] : Decreased breath sounds left side about one third the way up [de-identified] : Distended with positive fluid wave [de-identified] : Mild as moderate tenderness to palpation left lateral rib cage [de-identified] : Ecchymosis about the left eye with nodular swelling at left brow with sutured laceration intact

## 2018-06-14 NOTE — ASSESSMENT
[FreeTextEntry1] : 82-year-old man with chronic alcoholism who states he stopped drinking about one month ago presents after a trip and fall resulting in a fracture of the left seventh rib and laceration of the left superior orbit.\par Laceration was sutured and dry and intact without signs of infection.\par \par Left rib pain is moderate and improving.\par \par Chronic alcoholism now with ascites and left pleural effusion therefore decompensation.\par Patient is to make appointment with gastroenterology.\par \par Type 2 diabetes controlled currently without medication therefore continued no medication.\par \par Medication to be continued includes ramipril, pantoprazole and folic acid.\par \par Followup in 4 days for suture removal.

## 2018-06-14 NOTE — HISTORY OF PRESENT ILLNESS
[FreeTextEntry1] : Post ER after fall [de-identified] : Followup after presenting to [facility] on June 11, 2018 after a trip and fall when the patient was walking his dog and tripped over the dorsal each.\par The patient hit his head but also his left shoulder and left chest.\par No loss of consciousness.\par \par CT scan of the chest-moderate sized left pleural effusion and small right pleural effusion associated with compressive atelectasis.\par Nondisplaced hairline fracture left seventh rib.\par Abundant abdominal ascites with atrophic appearance of the liver consistent with cirrhosis.\par \par CT cervical spine-degenerative changes without fracture\par \par -CT of the facial bones-left periorbital soft tissue swelling without fracture\par \par CT of the brain-diffuse cortical atrophy without acute infarct or hemorrhage\par \par -X-ray of the left shoulder with no fracture\par \par -X-ray of the left ribs showed a moderate left effusion without fracture.\par \par The patient also suffered a laceration of the left brow which was sutured.\par \par Patient was discharged and will followup here.\par \par The patient states that his pain is mainly his left rib cage and his left eyebrow.\par \par The patient's history also includes chronic alcoholism with cirrhosis and he states he stopped drinking about a month ago because he noticed his abdomen getting larger.\par \par History also includes hypertension for which he was on Ramipril and type 2 diabetes and was on Lantus and metformin which he has not been taking.\par Patient had blood work done here one week ago with hemoglobin A1c at 5.3.\par \par The patient had admitted about 8 months ago secondary to upper GI bleed he was put on pantoprazole which he has not continued. none

## 2018-06-14 NOTE — REVIEW OF SYSTEMS
[Fatigue] : fatigue [Negative] : Psychiatric [Abdominal Pain] : no abdominal pain [Nausea] : no nausea [Vomiting] : no vomiting [Heartburn] : no heartburn [Melena] : no melena [FreeTextEntry7] : Increased abdominal girth

## 2018-06-18 ENCOUNTER — NON-APPOINTMENT (OUTPATIENT)
Age: 83
End: 2018-06-18

## 2018-06-18 ENCOUNTER — APPOINTMENT (OUTPATIENT)
Dept: CARDIOLOGY | Facility: CLINIC | Age: 83
End: 2018-06-18
Payer: MEDICARE

## 2018-06-18 VITALS
OXYGEN SATURATION: 94 % | HEIGHT: 69 IN | HEART RATE: 94 BPM | DIASTOLIC BLOOD PRESSURE: 81 MMHG | BODY MASS INDEX: 26.96 KG/M2 | WEIGHT: 182 LBS | SYSTOLIC BLOOD PRESSURE: 149 MMHG

## 2018-06-18 DIAGNOSIS — R94.31 ABNORMAL ELECTROCARDIOGRAM [ECG] [EKG]: ICD-10-CM

## 2018-06-18 PROCEDURE — 93000 ELECTROCARDIOGRAM COMPLETE: CPT

## 2018-06-18 PROCEDURE — 99204 OFFICE O/P NEW MOD 45 MIN: CPT

## 2018-06-19 ENCOUNTER — APPOINTMENT (OUTPATIENT)
Dept: INTERNAL MEDICINE | Facility: CLINIC | Age: 83
End: 2018-06-19
Payer: MEDICARE

## 2018-06-19 VITALS
WEIGHT: 182 LBS | HEIGHT: 69 IN | HEART RATE: 85 BPM | BODY MASS INDEX: 26.96 KG/M2 | SYSTOLIC BLOOD PRESSURE: 130 MMHG | DIASTOLIC BLOOD PRESSURE: 80 MMHG

## 2018-06-19 DIAGNOSIS — Z48.02 ENCOUNTER FOR REMOVAL OF SUTURES: ICD-10-CM

## 2018-06-19 DIAGNOSIS — J06.9 ACUTE UPPER RESPIRATORY INFECTION, UNSPECIFIED: ICD-10-CM

## 2018-06-19 PROBLEM — R94.31 ABNORMAL ECG: Status: ACTIVE | Noted: 2018-06-19

## 2018-06-19 PROCEDURE — 99213 OFFICE O/P EST LOW 20 MIN: CPT

## 2018-07-02 ENCOUNTER — APPOINTMENT (OUTPATIENT)
Dept: CARDIOLOGY | Facility: CLINIC | Age: 83
End: 2018-07-02
Payer: MEDICARE

## 2018-07-02 PROCEDURE — 93306 TTE W/DOPPLER COMPLETE: CPT

## 2018-07-16 ENCOUNTER — TRANSCRIPTION ENCOUNTER (OUTPATIENT)
Age: 83
End: 2018-07-16

## 2018-07-20 PROBLEM — I10 ESSENTIAL (PRIMARY) HYPERTENSION: Chronic | Status: ACTIVE | Noted: 2017-08-17

## 2018-07-28 PROBLEM — C44.111: Status: RESOLVED | Noted: 2017-04-20 | Resolved: 2018-07-28

## 2018-07-28 PROBLEM — C44.311 BASAL CELL CARCINOMA OF NOSE: Status: RESOLVED | Noted: 2017-04-20 | Resolved: 2018-07-28

## 2018-07-29 ENCOUNTER — FORM ENCOUNTER (OUTPATIENT)
Age: 83
End: 2018-07-29

## 2018-07-30 ENCOUNTER — APPOINTMENT (OUTPATIENT)
Dept: INTERNAL MEDICINE | Facility: CLINIC | Age: 83
End: 2018-07-30
Payer: MEDICARE

## 2018-07-30 ENCOUNTER — OUTPATIENT (OUTPATIENT)
Dept: OUTPATIENT SERVICES | Facility: HOSPITAL | Age: 83
LOS: 1 days | End: 2018-07-30
Payer: MEDICARE

## 2018-07-30 ENCOUNTER — APPOINTMENT (OUTPATIENT)
Dept: RADIOLOGY | Facility: CLINIC | Age: 83
End: 2018-07-30

## 2018-07-30 VITALS
RESPIRATION RATE: 13 BRPM | DIASTOLIC BLOOD PRESSURE: 70 MMHG | OXYGEN SATURATION: 96 % | WEIGHT: 170 LBS | HEART RATE: 84 BPM | BODY MASS INDEX: 25.18 KG/M2 | HEIGHT: 69 IN | SYSTOLIC BLOOD PRESSURE: 120 MMHG

## 2018-07-30 DIAGNOSIS — K92.2 GASTROINTESTINAL HEMORRHAGE, UNSPECIFIED: ICD-10-CM

## 2018-07-30 DIAGNOSIS — D62 ACUTE POSTHEMORRHAGIC ANEMIA: ICD-10-CM

## 2018-07-30 DIAGNOSIS — Z98.890 OTHER SPECIFIED POSTPROCEDURAL STATES: Chronic | ICD-10-CM

## 2018-07-30 DIAGNOSIS — K70.31 ALCOHOLIC CIRRHOSIS OF LIVER WITH ASCITES: ICD-10-CM

## 2018-07-30 DIAGNOSIS — J90 PLEURAL EFFUSION, NOT ELSEWHERE CLASSIFIED: ICD-10-CM

## 2018-07-30 DIAGNOSIS — I35.0 NONRHEUMATIC AORTIC (VALVE) STENOSIS: ICD-10-CM

## 2018-07-30 DIAGNOSIS — E11.9 TYPE 2 DIABETES MELLITUS W/OUT COMPLICATIONS: ICD-10-CM

## 2018-07-30 DIAGNOSIS — Z79.899 OTHER LONG TERM (CURRENT) DRUG THERAPY: ICD-10-CM

## 2018-07-30 DIAGNOSIS — Z01.818 ENCOUNTER FOR OTHER PREPROCEDURAL EXAMINATION: ICD-10-CM

## 2018-07-30 DIAGNOSIS — I10 ESSENTIAL (PRIMARY) HYPERTENSION: ICD-10-CM

## 2018-07-30 DIAGNOSIS — K20.9 ESOPHAGITIS, UNSPECIFIED: ICD-10-CM
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
## 2018-07-30 DIAGNOSIS — S05.42XA: ICD-10-CM

## 2018-07-30 DIAGNOSIS — Z78.9 OTHER SPECIFIED HEALTH STATUS: ICD-10-CM

## 2018-07-30 PROCEDURE — 36415 COLL VENOUS BLD VENIPUNCTURE: CPT

## 2018-07-30 PROCEDURE — 71046 X-RAY EXAM CHEST 2 VIEWS: CPT

## 2018-07-30 PROCEDURE — 71046 X-RAY EXAM CHEST 2 VIEWS: CPT | Mod: 26

## 2018-07-30 PROCEDURE — 99214 OFFICE O/P EST MOD 30 MIN: CPT | Mod: 25

## 2018-07-31 LAB
ALBUMIN SERPL ELPH-MCNC: 3.2 G/DL
ALP BLD-CCNC: 114 U/L
ALT SERPL-CCNC: 30 U/L
ANION GAP SERPL CALC-SCNC: 16 MMOL/L
APTT BLD: 34.2 SEC
AST SERPL-CCNC: 59 U/L
BASOPHILS # BLD AUTO: 0.01 K/UL
BASOPHILS NFR BLD AUTO: 0.2 %
BILIRUB SERPL-MCNC: 2 MG/DL
BUN SERPL-MCNC: 21 MG/DL
CALCIUM SERPL-MCNC: 9.2 MG/DL
CHLORIDE SERPL-SCNC: 94 MMOL/L
CO2 SERPL-SCNC: 22 MMOL/L
CREAT SERPL-MCNC: 1.16 MG/DL
EOSINOPHIL # BLD AUTO: 0.09 K/UL
EOSINOPHIL NFR BLD AUTO: 1.6 %
GLUCOSE SERPL-MCNC: 189 MG/DL
HCT VFR BLD CALC: 38 %
HGB BLD-MCNC: 13.1 G/DL
IMM GRANULOCYTES NFR BLD AUTO: 0.2 %
INR PPP: 1.32 RATIO
LYMPHOCYTES # BLD AUTO: 0.85 K/UL
LYMPHOCYTES NFR BLD AUTO: 15.2 %
MAGNESIUM SERPL-MCNC: 1.6 MG/DL
MAN DIFF?: NORMAL
MCHC RBC-ENTMCNC: 32 PG
MCHC RBC-ENTMCNC: 34.5 GM/DL
MCV RBC AUTO: 92.7 FL
MONOCYTES # BLD AUTO: 0.83 K/UL
MONOCYTES NFR BLD AUTO: 14.9 %
NEUTROPHILS # BLD AUTO: 3.79 K/UL
NEUTROPHILS NFR BLD AUTO: 67.9 %
PLATELET # BLD AUTO: 216 K/UL
POTASSIUM SERPL-SCNC: 4.5 MMOL/L
PROT SERPL-MCNC: 7.8 G/DL
PT BLD: 15 SEC
RBC # BLD: 4.1 M/UL
RBC # FLD: 17.4 %
SODIUM SERPL-SCNC: 132 MMOL/L
WBC # FLD AUTO: 5.58 K/UL

## 2018-07-31 RX ORDER — FUROSEMIDE 40 MG/1
40 TABLET ORAL DAILY
Qty: 90 | Refills: 0 | Status: ACTIVE | COMMUNITY
Start: 2018-06-20

## 2018-07-31 RX ORDER — SPIRONOLACTONE 100 MG/1
100 TABLET ORAL DAILY
Refills: 0 | Status: ACTIVE | COMMUNITY
Start: 2018-06-20

## 2018-07-31 NOTE — ASSESSMENT
[Patient Optimized for Surgery] : Patient optimized for surgery [No Further Testing Recommended] : no further testing recommended [Continue medications as is] : Continue current medications [FreeTextEntry4] : 82-year-old male with alcoholic cirrhosis with ascites without absolute contraindication to planned endoscopy with possible esophageal variceal ligation.\par \par Patient is to get a preoperative cardiac evaluation\par \par

## 2018-07-31 NOTE — RESULTS/DATA
[] : results reviewed [de-identified] : WNL [de-identified] : PTT= WNL, INR= 1.32 [de-identified] : WNL other than sodium at 132, SGOT at 59 and glucose at 189 [de-identified] : Moderate left and small right pleural effusion without change [de-identified] : June 18, 2018= NSR, PVC,LAD, PRWP

## 2018-07-31 NOTE — REVIEW OF SYSTEMS
[Fatigue] : fatigue [Shortness Of Breath] : shortness of breath [Dyspnea on Exertion] : dyspnea on exertion [Negative] : Heme/Lymph [Wheezing] : no wheezing [Cough] : no cough

## 2018-07-31 NOTE — HISTORY OF PRESENT ILLNESS
[Diabetes] : diabetes  [Aortic Stenosis] : aortic stenosis [Hypertension] : ~T hypertension [( Patient denies any chest pain, claudication, dyspnea on exertion, orthopnea, palpitations or syncope )] : Patient denies any chest pain, claudication, dyspnea on exertion, orthopnea, palpitations or syncope. [Coronary Artery Disease] : no coronary artery disease [Sleep Apnea] : no sleep apnea [COPD] : no COPD [Previous Adverse Anesthesia Reaction] : no previous adverse anesthesia reaction [FreeTextEntry1] : Endoscopy [FreeTextEntry2] : August 6, 208 [FreeTextEntry3] :  [FreeTextEntry4] : 82-year-old male with alcoholic cirrhosis with ascites present for evaluation prior to endoscopy with possible esophageal variceal banding.\par \par The patient's history is significant for chronic alcohol intake with resultant cirrhosis and ascites.\par The patient stopped drinking about 3-4 months ago.\par \par History also includes moderate aortic stenosis, type 2 diabetes and hypertension.\par \par The patient denies chest pain, palpitations, edema, abdominal pain, fever, chills, headache, dizziness, syncope.\par He is fatigued and does complain of some dyspnea on exertion. [FreeTextEntry7] : Echocardiogram July 2, 2018=\par Mildly decreased EF at 40-45% with grade 1 diastolic dysfunction and mild-moderate aortic stenosis

## 2018-07-31 NOTE — PHYSICAL EXAM
[No Acute Distress] : no acute distress [Normal Sclera/Conjunctiva] : normal sclera/conjunctiva [No JVD] : no jugular venous distention [No Respiratory Distress] : no respiratory distress  [No Accessory Muscle Use] : no accessory muscle use [Normal Rate] : normal rate  [No Carotid Bruits] : no carotid bruits [No Edema] : there was no peripheral edema [Soft] : abdomen soft [Non Tender] : non-tender [No Masses] : no abdominal mass palpated [No HSM] : no HSM [No Focal Deficits] : no focal deficits [Normal Affect] : the affect was normal [de-identified] : Chronically ill-appearing [de-identified] : Decreased breath sounds left base [de-identified] : Distended

## 2018-08-09 ENCOUNTER — APPOINTMENT (OUTPATIENT)
Dept: CARDIOLOGY | Facility: CLINIC | Age: 83
End: 2018-08-09

## 2018-08-10 ENCOUNTER — MESSAGE (OUTPATIENT)
Age: 83
End: 2018-08-10

## 2018-08-23 ENCOUNTER — APPOINTMENT (OUTPATIENT)
Dept: GASTROENTEROLOGY | Facility: CLINIC | Age: 83
End: 2018-08-23

## 2018-09-24 ENCOUNTER — APPOINTMENT (OUTPATIENT)
Dept: INTERNAL MEDICINE | Facility: CLINIC | Age: 83
End: 2018-09-24

## 2020-12-14 NOTE — ED ADULT NURSE REASSESSMENT NOTE - VISUAL DISTURBANCES
PNDS met, po per I&O sheet. Pt dressed, up in recliner and transported to Phase 2.   
0=not present

## 2021-10-06 PROBLEM — I10 ESSENTIAL HYPERTENSION: Status: ACTIVE | Noted: 2017-08-22

## 2022-07-26 NOTE — PATIENT PROFILE ADULT. - FUNCTIONAL SCREEN CURRENT LEVEL: TRANSFERRING, MLM

## 2024-08-19 NOTE — PROGRESS NOTE ADULT - ASSESSMENT
81 y.o. M with PMH DM2, HTN, alcoholic cirrhosis, basal cell carcinoma s/p Moh's presents from PMD office with +MSSA left knee arthrocentesis S/P I&D of left knee 3/31/17. MSSA bacteremia positive Bcx 3/29/17 and 4/2/17 and repeat Bcx4/5/17 and 4/6/17  showing no growth thus far, with negative TTE/MICAELA for vegetations. Patient also developed C. Diff and was found to have colitis on the ct abd/pelvis, as well as a  prostate abscess which will likely be drained next week. Incidental finding of developing splenic infarct observed on ct abd/pelvis on 4/5/17 for which surgery recommends no intervention at this time. Impaired